# Patient Record
Sex: FEMALE | Race: WHITE | NOT HISPANIC OR LATINO | Employment: UNEMPLOYED | URBAN - METROPOLITAN AREA
[De-identification: names, ages, dates, MRNs, and addresses within clinical notes are randomized per-mention and may not be internally consistent; named-entity substitution may affect disease eponyms.]

---

## 2017-12-26 ENCOUNTER — HOSPITAL ENCOUNTER (EMERGENCY)
Facility: HOSPITAL | Age: 32
Discharge: HOME | End: 2017-12-26
Attending: EMERGENCY MEDICINE | Admitting: EMERGENCY MEDICINE
Payer: COMMERCIAL

## 2017-12-26 ENCOUNTER — APPOINTMENT (EMERGENCY)
Dept: RADIOLOGY | Facility: HOSPITAL | Age: 32
End: 2017-12-26
Payer: COMMERCIAL

## 2017-12-26 VITALS
HEART RATE: 101 BPM | WEIGHT: 202 LBS | BODY MASS INDEX: 38.14 KG/M2 | TEMPERATURE: 98.7 F | SYSTOLIC BLOOD PRESSURE: 130 MMHG | HEIGHT: 61 IN | RESPIRATION RATE: 18 BRPM | OXYGEN SATURATION: 98 % | DIASTOLIC BLOOD PRESSURE: 64 MMHG

## 2017-12-26 DIAGNOSIS — J40 BRONCHITIS: Primary | ICD-10-CM

## 2017-12-26 PROCEDURE — 71020 HB CHEST X-RAY 2VW FRONTAL&LATL: CPT

## 2017-12-26 PROCEDURE — 99283 EMERGENCY DEPT VISIT LOW MDM: CPT

## 2017-12-26 RX ORDER — CETIRIZINE HYDROCHLORIDE 10 MG/1
10 TABLET ORAL AS NEEDED
COMMUNITY

## 2017-12-26 RX ORDER — BENZONATATE 100 MG/1
100 CAPSULE ORAL 3 TIMES DAILY PRN
Qty: 20 CAPSULE | Refills: 0 | Status: SHIPPED | OUTPATIENT
Start: 2017-12-26 | End: 2019-02-13

## 2017-12-26 RX ORDER — GUAIFENESIN/DEXTROMETHORPHAN 100-10MG/5
5 SYRUP ORAL 3 TIMES DAILY PRN
Qty: 118 ML | Refills: 0 | Status: SHIPPED | OUTPATIENT
Start: 2017-12-26 | End: 2019-02-13

## 2017-12-26 RX ORDER — ROSUVASTATIN CALCIUM 10 MG/1
10 TABLET, COATED ORAL DAILY
COMMUNITY
End: 2019-02-13

## 2017-12-26 NOTE — ED PROVIDER NOTES
History  Chief Complaint   Patient presents with    URI     Pt having productive cough, congestion, and painful glands  Pt finish levaquin 3 weeks ago but cough getting worse  Suni Richards is a 33 y o  female w PMH HLD, asthma, seasonal allergies who presents for evaluation of cough  Pt w productive cough, rhinorrhea, headache, general myalgias for past few days  Ha located in frontal region, mild in nature  No fever / chills  No n/v/d  Was on levaquin, prednisone in past few weeks  has never been admitted for asthma  Prior to Admission Medications   Prescriptions Last Dose Informant Patient Reported? Taking? NON FORMULARY   Yes Yes   beclomethasone (QVAR) 80 MCG/ACT inhaler   Yes Yes   Sig: Inhale 1 puff 2 (two) times a day   cetirizine (ZyrTEC) 10 mg tablet   Yes Yes   Sig: Take 10 mg by mouth daily   rosuvastatin (CRESTOR) 10 MG tablet   Yes Yes   Sig: Take 10 mg by mouth daily      Facility-Administered Medications: None       No past medical history on file  No past surgical history on file  No family history on file  I have reviewed and agree with the history as documented  Social History   Substance Use Topics    Smoking status: Not on file    Smokeless tobacco: Not on file    Alcohol use Not on file        Review of Systems   Constitutional: Positive for fatigue  Negative for chills, diaphoresis and fever  HENT: Positive for congestion  Negative for sore throat  Eyes: Negative for visual disturbance  Respiratory: Positive for cough  Negative for chest tightness, shortness of breath and wheezing  Cardiovascular: Negative for chest pain and leg swelling  Gastrointestinal: Negative for abdominal pain, constipation, diarrhea, nausea and vomiting  Genitourinary: Negative for difficulty urinating, dysuria, frequency, hematuria, urgency, vaginal bleeding, vaginal discharge and vaginal pain  Musculoskeletal: Negative for arthralgias and myalgias     Neurological: Positive for headaches  Negative for dizziness, weakness, light-headedness and numbness  Psychiatric/Behavioral: The patient is not nervous/anxious  Physical Exam  ED Triage Vitals [12/26/17 0940]   Temperature Pulse Respirations Blood Pressure SpO2   98 7 °F (37 1 °C) 101 18 130/64 98 %      Temp Source Heart Rate Source Patient Position - Orthostatic VS BP Location FiO2 (%)   Oral Monitor -- -- --      Pain Score       Worst Possible Pain           Orthostatic Vital Signs  Vitals:    12/26/17 0940   BP: 130/64   Pulse: 101       Physical Exam   Constitutional: She is oriented to person, place, and time  She appears well-developed and well-nourished  No distress  HENT:   Head: Normocephalic and atraumatic  Eyes: Pupils are equal, round, and reactive to light  Neck: Neck supple  No tracheal deviation present  Cardiovascular: Normal rate, regular rhythm and intact distal pulses  Exam reveals no gallop and no friction rub  No murmur heard  Pulmonary/Chest: Effort normal and breath sounds normal  No respiratory distress  She has no wheezes  She has no rales  Abdominal: Soft  Bowel sounds are normal  She exhibits no distension and no mass  There is no tenderness  There is no guarding  Musculoskeletal: She exhibits no edema or deformity  Neurological: She is alert and oriented to person, place, and time  Skin: Skin is warm and dry  She is not diaphoretic  Psychiatric: She has a normal mood and affect  Her behavior is normal    Nursing note and vitals reviewed  ED Medications  Medications - No data to display    Diagnostic Studies  Results Reviewed     None                 XR chest 2 views   Final Result by Ney Fleming MD (12/26 1114)      No active pulmonary disease           Workstation performed: [id number]                    Procedures  Procedures       Phone Contacts  ED Phone Contact    ED Course  ED Course                                MDM  Number of Diagnoses or Management Options  Diagnosis management comments: DDX includes but not ltd to:   Asthma worsened by underlying bronchitis vs pna   No acute exacerbation of asthma here, no requirement for neb tx here, no resp distress     Plan is to obtain:  CXR to check for congestive changes, active pulmonary disease     Based on results:  tx for bronchitis, will have her continue take cough syrup, PCP follow up, continue PRN nebs      Return parameters discussed  Pt requires f/u as an outpt  Pt expresses understanding w above treatment plan  All questions answered prior to d/c  Portions of the record may have been created with voice recognition software   Occasional wrong word or "sound a like" substitutions may have occurred due to the inherent limitations of voice recognition software   Read the chart carefully and recognize, using context, where substitutions have occurred  CritCare Time    Disposition  Final diagnoses:   Bronchitis     Time reflects when diagnosis was documented in both MDM as applicable and the Disposition within this note     Time User Action Codes Description Comment    12/26/2017 11:24 AM Jil Portal Add [J40] Bronchitis       ED Disposition     ED Disposition Condition Comment    Discharge  Rodney Ramirez discharge to home/self care      Condition at discharge: Good        Follow-up Information     Follow up With Specialties Details Why Contact Info Additional Information    [facility]  If symptoms worsen [address] [facility] [address]  [phone] [place] [facility], [address] [place], [place], [address] [facility] , [address] [facility] In 2 days  [address]  [facility] [address]  [phone]           Discharge Medication List as of 12/26/2017 11:26 AM      START taking these medications    Details   benzonatate (TESSALON PERLES) 100 mg capsule Take 1 capsule by mouth 3 (three) times a day as needed for cough, Starting Tue 12/26/2017, Print      dextromethorphan-guaiFENesin (ROBITUSSIN DM)  mg/5 mL syrup Take 5 mL by mouth 3 (three) times a day as needed for cough, Starting Tue 12/26/2017, Print         CONTINUE these medications which have NOT CHANGED    Details   beclomethasone (QVAR) 80 MCG/ACT inhaler Inhale 1 puff 2 (two) times a day, Historical Med      cetirizine (ZyrTEC) 10 mg tablet Take 10 mg by mouth daily, Historical Med      NON FORMULARY Historical Med      rosuvastatin (CRESTOR) 10 MG tablet Take 10 mg by mouth daily, Historical Med           No discharge procedures on file      ED Provider  Electronically Signed by           Vinay Gant PA-C  12/29/17 1973

## 2017-12-26 NOTE — DISCHARGE INSTRUCTIONS
Acute Bronchitis   WHAT YOU NEED TO KNOW:   Acute bronchitis is swelling and irritation in the air passages of your lungs  This irritation may cause you to cough or have other breathing problems  Acute bronchitis often starts because of another illness, such as a cold or the flu  The illness spreads from your nose and throat to your windpipe and airways  Bronchitis is often called a chest cold  Acute bronchitis lasts about 3 to 6 weeks and is usually not a serious illness  Your cough can last for several weeks  DISCHARGE INSTRUCTIONS:   Return to the emergency department if:   · You cough up blood  · Your lips or fingernails turn blue  · You feel like you are not getting enough air when you breathe  Contact your healthcare provider if:   · You have a fever  · Your breathing problems do not go away or get worse  · Your cough does not get better within 4 weeks  · You have questions or concerns about your condition or care  Self-care:   · Get more rest   Rest helps your body to heal  Slowly start to do more each day  Rest when you feel it is needed  · Avoid irritants in the air  Avoid chemicals, fumes, and dust  Wear a face mask if you must work around dust or fumes  Stay inside on days when air pollution levels are high  If you have allergies, stay inside when pollen counts are high  Do not use aerosol products, such as spray-on deodorant, bug spray, and hair spray  · Do not smoke or be around others who smoke  Nicotine and other chemicals in cigarettes and cigars damages the cilia that move mucus out of your lungs  Ask your healthcare provider for information if you currently smoke and need help to quit  E-cigarettes or smokeless tobacco still contain nicotine  Talk to your healthcare provider before you use these products  · Drink liquids as directed  Liquids help keep your air passages moist and help you cough up mucus   You may need to drink more liquids when you have acute bronchitis  Ask how much liquid to drink each day and which liquids are best for you  · Use a humidifier or vaporizer  Use a cool mist humidifier or a vaporizer to increase air moisture in your home  This may make it easier for you to breathe and help decrease your cough  Decrease risk for acute bronchitis:   · Get the vaccinations you need  Ask your healthcare provider if you should get vaccinated against the flu or pneumonia  · Prevent the spread of germs  You can decrease your risk of acute bronchitis and other illnesses by doing the following:     [facility] your hands often with soap and water  Carry germ-killing hand lotion or gel with you  You can use the lotion or gel to clean your hands when soap and water are not available  ¨ Do not touch your eyes, nose, or mouth unless you have washed your hands first     ¨ Always cover your mouth when you cough to prevent the spread of germs  It is best to cough into a tissue or your shirt sleeve instead of into your hand  Ask those around you cover their mouths when they cough  ¨ Try to avoid people who have a cold or the flu  If you are sick, stay away from others as much as possible  Medicines: Your healthcare provider may  give you any of the following:  · Ibuprofen or acetaminophen  are medicines that help lower your fever  They are available without a doctor's order  Ask your healthcare provider which medicine is right for you  Ask how much to take and how often to take it  Follow directions  These medicines can cause stomach bleeding if not taken correctly  Ibuprofen can cause kidney damage  Do not take ibuprofen if you have kidney disease, an ulcer, or allergies to aspirin  Acetaminophen can cause liver damage  Do not take more than 4,000 milligrams in 24 hours  · Decongestants  help loosen mucus in your lungs and make it easier to cough up  This can help you breathe easier  · Cough suppressants  decrease your urge to cough   If your cough produces mucus, do not take a cough suppressant unless your healthcare provider tells you to  Your healthcare provider may suggest that you take a cough suppressant at night so you can rest     · Inhalers  may be given  Your healthcare provider may give you one or more inhalers to help you breathe easier and cough less  An inhaler gives your medicine to open your airways  Ask your healthcare provider to show you how to use your inhaler correctly  · Take your medicine as directed  Contact your healthcare provider if you think your medicine is not helping or if you have side effects  Tell him of her if you are allergic to any medicine  Keep a list of the medicines, vitamins, and herbs you take  Include the amounts, and when and why you take them  Bring the list or the pill bottles to follow-up visits  Carry your medicine list with you in case of an emergency  Follow up with your healthcare provider as directed:  Write down questions you have so you will remember to ask them during your follow-up visits  © 2017 [employer] Information is for End User's use only and may not be sold, redistributed or otherwise used for commercial purposes  All illustrations and images included in CareNotes® are the copyrighted property of [employer]  or Marciano Myers  The above information is an  only  It is not intended as medical advice for individual conditions or treatments  Talk to your doctor, nurse or pharmacist before following any medical regimen to see if it is safe and effective for you

## 2019-02-13 ENCOUNTER — OFFICE VISIT (OUTPATIENT)
Dept: URGENT CARE | Facility: CLINIC | Age: 34
End: 2019-02-13
Payer: COMMERCIAL

## 2019-02-13 ENCOUNTER — TELEPHONE (OUTPATIENT)
Dept: URGENT CARE | Facility: CLINIC | Age: 34
End: 2019-02-13

## 2019-02-13 ENCOUNTER — APPOINTMENT (OUTPATIENT)
Dept: RADIOLOGY | Facility: CLINIC | Age: 34
End: 2019-02-13
Payer: COMMERCIAL

## 2019-02-13 VITALS
HEART RATE: 133 BPM | SYSTOLIC BLOOD PRESSURE: 116 MMHG | BODY MASS INDEX: 41.66 KG/M2 | RESPIRATION RATE: 16 BRPM | OXYGEN SATURATION: 97 % | DIASTOLIC BLOOD PRESSURE: 82 MMHG | WEIGHT: 226.4 LBS | TEMPERATURE: 97.3 F | HEIGHT: 62 IN

## 2019-02-13 DIAGNOSIS — B96.89 ACUTE BACTERIAL BRONCHITIS: Primary | ICD-10-CM

## 2019-02-13 DIAGNOSIS — R05.9 COUGH: ICD-10-CM

## 2019-02-13 DIAGNOSIS — J20.8 ACUTE BACTERIAL BRONCHITIS: Primary | ICD-10-CM

## 2019-02-13 PROCEDURE — 71046 X-RAY EXAM CHEST 2 VIEWS: CPT

## 2019-02-13 PROCEDURE — 99213 OFFICE O/P EST LOW 20 MIN: CPT | Performed by: PHYSICIAN ASSISTANT

## 2019-02-13 RX ORDER — METHYLPREDNISOLONE 4 MG/1
TABLET ORAL
Qty: 1 EACH | Refills: 0 | Status: SHIPPED | OUTPATIENT
Start: 2019-02-13 | End: 2019-02-22 | Stop reason: ALTCHOICE

## 2019-02-13 RX ORDER — DEXTROMETHORPHAN HYDROBROMIDE AND PROMETHAZINE HYDROCHLORIDE 15; 6.25 MG/5ML; MG/5ML
5 SYRUP ORAL 4 TIMES DAILY PRN
Qty: 118 ML | Refills: 0 | Status: SHIPPED | OUTPATIENT
Start: 2019-02-13 | End: 2019-02-22 | Stop reason: ALTCHOICE

## 2019-02-13 RX ORDER — LEVOFLOXACIN 750 MG/1
750 TABLET ORAL EVERY 24 HOURS
Qty: 5 TABLET | Refills: 0 | OUTPATIENT
Start: 2019-02-13 | End: 2019-02-14

## 2019-02-13 RX ORDER — ALBUTEROL SULFATE 90 UG/1
2 AEROSOL, METERED RESPIRATORY (INHALATION) EVERY 6 HOURS PRN
Qty: 18 G | Refills: 0 | Status: SHIPPED | OUTPATIENT
Start: 2019-02-13 | End: 2019-06-25

## 2019-02-13 NOTE — TELEPHONE ENCOUNTER
Reviewed final radiology results with patient  Continue antibiotics and steroids as planned  Follow up with PCP in 4-6 weeks for repeat chest x-ray  May need a CT scan if infiltrate is still present  Patient understands instructions  Gave information for primary care at [facility]  Patient states she does not see PCP and a regular basis

## 2019-02-13 NOTE — PROGRESS NOTES
[facility]        NAME: Nilsa Cancino is a 29 y o  female  : 1985    MRN: [id number]  DATE: 2019  TIME: 10:53 AM    Assessment and Plan   Acute bacterial bronchitis [J20 8, B96 89]  1  Acute bacterial bronchitis  XR chest pa & lateral    methylPREDNISolone 4 MG tablet therapy pack    promethazine-dextromethorphan (PHENERGAN-DM) 6 25-15 mg/5 mL oral syrup    levofloxacin (LEVAQUIN) 750 mg tablet    albuterol (VENTOLIN HFA) 90 mcg/act inhaler     Chest x-ray reviewed by myself; Left lower lung consolidation suspicious for pna    Patient Instructions       Follow up with PCP in 3-5 days  Proceed to  ER if symptoms worsen  Chief Complaint     Chief Complaint   Patient presents with    Cold Like Symptoms     started Monday  complains of cough and congestion, wheezing especially at night, SOB  using sons albuterol neb at home with not much relief   Cough         History of Present Illness       62-year-old female with past medical history of asthmatic bronchitis presents with a cough and congestion for 4 days  Patient states her son is sick at with similar symptoms  Patient states her son was tested for the flu was negative  She complains of chills, wheezing and shortness of breath  Denies fever at home  Review of Systems   Review of Systems   Constitutional: Negative for chills, fatigue and fever  HENT: Positive for congestion  Negative for ear pain, sinus pain, sore throat and trouble swallowing  Eyes: Negative for pain, discharge and redness  Respiratory: Positive for cough, shortness of breath and wheezing  Negative for chest tightness  Cardiovascular: Negative for chest pain, palpitations and leg swelling  Gastrointestinal: Negative for abdominal pain, diarrhea, nausea and vomiting  Musculoskeletal: Negative for arthralgias, joint swelling and myalgias  Skin: Negative for rash  Neurological: Negative for dizziness, weakness, numbness and headaches  Current Medications       Current Outpatient Medications:     cetirizine (ZyrTEC) 10 mg tablet, Take 10 mg by mouth as needed , Disp: , Rfl:     albuterol (VENTOLIN HFA) 90 mcg/act inhaler, Inhale 2 puffs every 6 (six) hours as needed for wheezing, Disp: 18 g, Rfl: 0    levofloxacin (LEVAQUIN) 750 mg tablet, Take 1 tablet (750 mg total) by mouth every 24 hours for 5 days, Disp: 5 tablet, Rfl: 0    methylPREDNISolone 4 MG tablet therapy pack, Use as directed on package, Disp: 1 each, Rfl: 0    promethazine-dextromethorphan (PHENERGAN-DM) 6 25-15 mg/5 mL oral syrup, Take 5 mL by mouth 4 (four) times a day as needed for cough, Disp: 118 mL, Rfl: 0    Current Allergies     Allergies as of 2019 - Reviewed 2019   Allergen Reaction Noted    Penicillins  2017    Zithromax [azithromycin] GI Intolerance 2017    Clindamycin Rash 2017            The following portions of the patient's history were reviewed and updated as appropriate: allergies, current medications, past family history, past medical history, past social history, past surgical history and problem list      Past Medical History:   Diagnosis Date    Elevated cholesterol        Past Surgical History:   Procedure Laterality Date     SECTION      DILATION AND CURETTAGE OF UTERUS         No family history on file  Medications have been verified  Objective   /82 (BP Location: Left arm, Patient Position: Sitting)   Pulse (!) 133   Temp (!) 97 3 °F (36 3 °C) (Tympanic)   Resp 16   Ht 5' 2" (1 575 m)   Wt 103 kg (226 lb 6 4 oz)   LMP 2019   SpO2 97%   BMI 41 41 kg/m²        Physical Exam     Physical Exam   Constitutional: She is oriented to person, place, and time  She appears well-developed and well-nourished  No distress  HENT:   Head: Normocephalic     Right Ear: External ear normal    Left Ear: External ear normal    Mouth/Throat: Oropharynx is clear and moist    Eyes: Pupils are equal, round, and reactive to light  Conjunctivae and EOM are normal    Neck: Normal range of motion  Neck supple  Cardiovascular: Normal rate, regular rhythm and normal heart sounds  No murmur heard  Pulmonary/Chest: Effort normal  No respiratory distress  She has wheezes (Diffuse expiratory wheezes throughout)  Abdominal: Soft  Bowel sounds are normal  There is no tenderness  Musculoskeletal: Normal range of motion  Lymphadenopathy:     She has no cervical adenopathy  Neurological: She is alert and oriented to person, place, and time  She has normal reflexes  Skin: Skin is warm and dry  Psychiatric: She has a normal mood and affect  Nursing note and vitals reviewed

## 2019-02-14 ENCOUNTER — APPOINTMENT (EMERGENCY)
Dept: CT IMAGING | Facility: HOSPITAL | Age: 34
End: 2019-02-14
Payer: COMMERCIAL

## 2019-02-14 ENCOUNTER — HOSPITAL ENCOUNTER (EMERGENCY)
Facility: HOSPITAL | Age: 34
Discharge: HOME | End: 2019-02-14
Attending: EMERGENCY MEDICINE | Admitting: EMERGENCY MEDICINE
Payer: COMMERCIAL

## 2019-02-14 VITALS
TEMPERATURE: 97.8 F | SYSTOLIC BLOOD PRESSURE: 126 MMHG | DIASTOLIC BLOOD PRESSURE: 60 MMHG | RESPIRATION RATE: 24 BRPM | WEIGHT: 227.07 LBS | HEART RATE: 103 BPM | HEIGHT: 62 IN | BODY MASS INDEX: 41.79 KG/M2 | OXYGEN SATURATION: 99 %

## 2019-02-14 DIAGNOSIS — J18.9 PNEUMONIA: ICD-10-CM

## 2019-02-14 DIAGNOSIS — R05.9 COUGH: ICD-10-CM

## 2019-02-14 DIAGNOSIS — J06.9 UPPER RESPIRATORY TRACT INFECTION, UNSPECIFIED TYPE: Primary | ICD-10-CM

## 2019-02-14 DIAGNOSIS — R09.81 NASAL CONGESTION: ICD-10-CM

## 2019-02-14 LAB
ANION GAP SERPL CALCULATED.3IONS-SCNC: 13 MMOL/L (ref 4–13)
BASOPHILS # BLD AUTO: 0.03 THOUSANDS/ΜL (ref 0–0.1)
BASOPHILS NFR BLD AUTO: 1 % (ref 0–1)
BUN SERPL-MCNC: 12 MG/DL (ref 5–25)
CALCIUM SERPL-MCNC: 8.9 MG/DL (ref 8.3–10.1)
CHLORIDE SERPL-SCNC: 102 MMOL/L (ref 100–108)
CO2 SERPL-SCNC: 24 MMOL/L (ref 21–32)
CREAT SERPL-MCNC: 0.93 MG/DL (ref 0.6–1.3)
EOSINOPHIL # BLD AUTO: 0 THOUSAND/ΜL (ref 0–0.61)
EOSINOPHIL NFR BLD AUTO: 0 % (ref 0–6)
ERYTHROCYTE [DISTWIDTH] IN BLOOD BY AUTOMATED COUNT: 13.4 % (ref 11.6–15.1)
GFR SERPL CREATININE-BSD FRML MDRD: 82 ML/MIN/1.73SQ M
GLUCOSE SERPL-MCNC: 102 MG/DL (ref 65–140)
HCG SERPL QL: NEGATIVE
HCT VFR BLD AUTO: 42.6 % (ref 34.8–46.1)
HGB BLD-MCNC: 14 G/DL (ref 11.5–15.4)
IMM GRANULOCYTES # BLD AUTO: 0.02 THOUSAND/UL (ref 0–0.2)
IMM GRANULOCYTES NFR BLD AUTO: 0 % (ref 0–2)
LYMPHOCYTES # BLD AUTO: 1.63 THOUSANDS/ΜL (ref 0.6–4.47)
LYMPHOCYTES NFR BLD AUTO: 28 % (ref 14–44)
MCH RBC QN AUTO: 29.9 PG (ref 26.8–34.3)
MCHC RBC AUTO-ENTMCNC: 32.9 G/DL (ref 31.4–37.4)
MCV RBC AUTO: 91 FL (ref 82–98)
MONOCYTES # BLD AUTO: 0.9 THOUSAND/ΜL (ref 0.17–1.22)
MONOCYTES NFR BLD AUTO: 15 % (ref 4–12)
NEUTROPHILS # BLD AUTO: 3.34 THOUSANDS/ΜL (ref 1.85–7.62)
NEUTS SEG NFR BLD AUTO: 56 % (ref 43–75)
NRBC BLD AUTO-RTO: 0 /100 WBCS
PLATELET # BLD AUTO: 272 THOUSANDS/UL (ref 149–390)
PMV BLD AUTO: 10.5 FL (ref 8.9–12.7)
POTASSIUM SERPL-SCNC: 3.5 MMOL/L (ref 3.5–5.3)
RBC # BLD AUTO: 4.69 MILLION/UL (ref 3.81–5.12)
SODIUM SERPL-SCNC: 139 MMOL/L (ref 136–145)
TROPONIN I SERPL-MCNC: <0.02 NG/ML
WBC # BLD AUTO: 5.92 THOUSAND/UL (ref 4.31–10.16)

## 2019-02-14 PROCEDURE — 93005 ELECTROCARDIOGRAM TRACING: CPT

## 2019-02-14 PROCEDURE — 84484 ASSAY OF TROPONIN QUANT: CPT | Performed by: PHYSICIAN ASSISTANT

## 2019-02-14 PROCEDURE — 85025 COMPLETE CBC W/AUTO DIFF WBC: CPT | Performed by: PHYSICIAN ASSISTANT

## 2019-02-14 PROCEDURE — 36415 COLL VENOUS BLD VENIPUNCTURE: CPT | Performed by: PHYSICIAN ASSISTANT

## 2019-02-14 PROCEDURE — 94640 AIRWAY INHALATION TREATMENT: CPT

## 2019-02-14 PROCEDURE — 80048 BASIC METABOLIC PNL TOTAL CA: CPT | Performed by: PHYSICIAN ASSISTANT

## 2019-02-14 PROCEDURE — 99285 EMERGENCY DEPT VISIT HI MDM: CPT

## 2019-02-14 PROCEDURE — 84703 CHORIONIC GONADOTROPIN ASSAY: CPT | Performed by: PHYSICIAN ASSISTANT

## 2019-02-14 PROCEDURE — 71260 CT THORAX DX C+: CPT

## 2019-02-14 RX ORDER — IPRATROPIUM BROMIDE AND ALBUTEROL SULFATE 2.5; .5 MG/3ML; MG/3ML
3 SOLUTION RESPIRATORY (INHALATION)
Status: DISCONTINUED | OUTPATIENT
Start: 2019-02-14 | End: 2019-02-15 | Stop reason: HOSPADM

## 2019-02-14 RX ORDER — IBUPROFEN 400 MG/1
400 TABLET ORAL EVERY 6 HOURS PRN
Qty: 20 TABLET | Refills: 0 | Status: SHIPPED | OUTPATIENT
Start: 2019-02-14 | End: 2020-03-09

## 2019-02-14 RX ORDER — IPRATROPIUM BROMIDE AND ALBUTEROL SULFATE 2.5; .5 MG/3ML; MG/3ML
3 SOLUTION RESPIRATORY (INHALATION)
Status: DISCONTINUED | OUTPATIENT
Start: 2019-02-14 | End: 2019-02-14

## 2019-02-14 RX ORDER — OXYMETAZOLINE HYDROCHLORIDE 0.05 G/100ML
2 SPRAY NASAL ONCE
Status: COMPLETED | OUTPATIENT
Start: 2019-02-14 | End: 2019-02-14

## 2019-02-14 RX ORDER — ALBUTEROL SULFATE 90 UG/1
2 AEROSOL, METERED RESPIRATORY (INHALATION) EVERY 4 HOURS PRN
Qty: 1 INHALER | Refills: 0 | Status: SHIPPED | OUTPATIENT
Start: 2019-02-14 | End: 2021-02-11 | Stop reason: ALTCHOICE

## 2019-02-14 RX ORDER — DOXYCYCLINE HYCLATE 100 MG/1
100 CAPSULE ORAL 2 TIMES DAILY
Qty: 14 CAPSULE | Refills: 0 | Status: SHIPPED | OUTPATIENT
Start: 2019-02-14 | End: 2019-02-21

## 2019-02-14 RX ORDER — ALBUTEROL SULFATE 90 UG/1
2 AEROSOL, METERED RESPIRATORY (INHALATION) ONCE
Status: COMPLETED | OUTPATIENT
Start: 2019-02-14 | End: 2019-02-14

## 2019-02-14 RX ORDER — GUAIFENESIN 100 MG/5ML
200 SYRUP ORAL 3 TIMES DAILY PRN
Qty: 120 ML | Refills: 0 | Status: SHIPPED | OUTPATIENT
Start: 2019-02-14 | End: 2019-02-24

## 2019-02-14 RX ADMIN — IPRATROPIUM BROMIDE AND ALBUTEROL SULFATE 3 ML: 2.5; .5 SOLUTION RESPIRATORY (INHALATION) at 20:37

## 2019-02-14 RX ADMIN — OXYMETAZOLINE HCL 2 SPRAY: 0.05 SPRAY NASAL at 21:29

## 2019-02-14 RX ADMIN — IPRATROPIUM BROMIDE AND ALBUTEROL SULFATE 3 ML: 2.5; .5 SOLUTION RESPIRATORY (INHALATION) at 21:31

## 2019-02-14 RX ADMIN — ALBUTEROL SULFATE 2 PUFF: 90 AEROSOL, METERED RESPIRATORY (INHALATION) at 22:56

## 2019-02-14 RX ADMIN — IOHEXOL 100 ML: 350 INJECTION, SOLUTION INTRAVENOUS at 22:04

## 2019-02-15 NOTE — ED NOTES
Ambulatory pulse ox: >95% for entire duration with top HR of 130        Doc Johnson RN  02/14/19 0438

## 2019-02-15 NOTE — ED PROVIDER NOTES
History  Chief Complaint   Patient presents with    Shortness of Breath     Pt states that yesterday she was dx with pneumonia, and states that today she feels like "i am breathing through a straw" c/o "heart racing"     Palpitations     Patient is a 28-year-old immunized female who presents emergency department with shortness of breath for 1 day  Patient states that associated symptoms are intermittent hacking productive cough with yellow sputum, and bilateral nasal congestion for 4 days  Patient stated that she had intermittent palpitations for 1 day for approximately 10 minute bouts  Patient was recently seen at urgent care 02/13/2019 for cold-like symptoms and cough, with chest x-ray indicating left lower lobe infiltrate suspicious for early pneumonia and vague 7 mm nodular density on left lower lobe, patient was diagnosed with acute bacterial bronchitis, discharged home care with prescriptions of Medrol Dosepak, Phenergan-dm, levofloxacin, and albuterol inhaler  Patient stated that she became short of breath with walking into emergency department whereby she needed to sit in a wheelchair and the wheeled to her room  Patient denies palliative and provocative factors  Patient denies ineffective treatment  Patient denies fevers, chills, nausea, and vomiting  Patient denies diarrhea, constipation, and urinary symptoms  Patient denies recent fall or trauma  Patient denies history of DVT, thrombophlebitis, and PE  Patient denies headaches, tinnitus, vertiginous, and meningeal symptoms  Patient recent travel  Patient confirms sick contact of 1year-old son at home with similar symptoms, with son recently testing negative for flu  Patient recently taking Medrol Dosepak scheduled indicated doses  Patient denies chest pain, shortness of breath, abdominal pain  Patient is not in acute distress        History provided by:  Patient   used: No    Shortness of Breath   Severity: MA attempted to call patient to schedule her follow up visit she is unable to reached left her VM to please give us a callback.    Mild  Onset quality:  Gradual  Duration:  1 day  Timing:  Constant  Progression:  Worsening  Context: activity    Relieved by:  Lying down and rest  Worsened by:  Exertion  Ineffective treatments:  None tried  Associated symptoms: wheezing    Associated symptoms: no abdominal pain, no chest pain, no cough, no ear pain, no fever, no headaches, no rash, no sore throat and no vomiting    Wheezing:     Severity:  Moderate    Onset quality:  Gradual    Duration:  3 days    Timing:  Intermittent    Progression:  Worsening    Chronicity:  New  Risk factors: obesity    Risk factors: no hx of PE/DVT, no oral contraceptive use, no prolonged immobilization, no recent surgery and no tobacco use        Prior to Admission Medications   Prescriptions Last Dose Informant Patient Reported? Taking? albuterol (VENTOLIN HFA) 90 mcg/act inhaler   No No   Sig: Inhale 2 puffs every 6 (six) hours as needed for wheezing   cetirizine (ZyrTEC) 10 mg tablet  Self Yes No   Sig: Take 10 mg by mouth as needed    levofloxacin (LEVAQUIN) 750 mg tablet   No No   Sig: Take 1 tablet (750 mg total) by mouth every 24 hours for 5 days   methylPREDNISolone 4 MG tablet therapy pack   No No   Sig: Use as directed on package   promethazine-dextromethorphan (PHENERGAN-DM) 6 25-15 mg/5 mL oral syrup   No No   Sig: Take 5 mL by mouth 4 (four) times a day as needed for cough      Facility-Administered Medications: None       Past Medical History:   Diagnosis Date    Elevated cholesterol        Past Surgical History:   Procedure Laterality Date     SECTION      DILATION AND CURETTAGE OF UTERUS         Family History   Problem Relation Age of Onset    Diabetes Mother      I have reviewed and agree with the history as documented      Social History     Tobacco Use    Smoking status: Never Smoker    Smokeless tobacco: Never Used   Substance Use Topics    Alcohol use: Never     Frequency: Never    Drug use: Never        Review of Systems Constitutional: Negative for chills and fever  HENT: Positive for congestion  Negative for ear pain, mouth sores, postnasal drip, rhinorrhea, sinus pressure, sneezing, sore throat, tinnitus and trouble swallowing  Eyes: Negative for photophobia and visual disturbance  Respiratory: Positive for shortness of breath and wheezing  Negative for cough and chest tightness  Cardiovascular: Negative for chest pain and palpitations  Gastrointestinal: Negative for abdominal pain, constipation, diarrhea, nausea and vomiting  Genitourinary: Negative for difficulty urinating, dysuria and frequency  Musculoskeletal: Negative for back pain and gait problem  Skin: Negative for pallor and rash  Allergic/Immunologic: Negative for environmental allergies and food allergies  Neurological: Negative for dizziness and headaches  Psychiatric/Behavioral: Negative for confusion  All other systems reviewed and are negative  Physical Exam  Physical Exam   Constitutional: She is oriented to person, place, and time  She appears well-developed and well-nourished  She is active and cooperative  Non-toxic appearance  She does not have a sickly appearance  She does not appear ill  No distress  HENT:   Head: Normocephalic and atraumatic  Right Ear: Hearing, tympanic membrane, external ear and ear canal normal  No drainage, swelling or tenderness  No mastoid tenderness  No decreased hearing is noted  Left Ear: Hearing, tympanic membrane, external ear and ear canal normal  No drainage, swelling or tenderness  No mastoid tenderness  No decreased hearing is noted  Nose: Nose normal  Right sinus exhibits no maxillary sinus tenderness and no frontal sinus tenderness  Left sinus exhibits no maxillary sinus tenderness and no frontal sinus tenderness  Mouth/Throat: Uvula is midline, oropharynx is clear and moist and mucous membranes are normal    Eyes: Pupils are equal, round, and reactive to light   Conjunctivae, EOM and lids are normal  Right eye exhibits no discharge  Left eye exhibits no discharge  Neck: Trachea normal, normal range of motion, full passive range of motion without pain and phonation normal  Neck supple  No JVD present  No tracheal tenderness, no spinous process tenderness and no muscular tenderness present  No neck rigidity  No tracheal deviation and normal range of motion present  Cardiovascular: Normal rate, regular rhythm, normal heart sounds, intact distal pulses and normal pulses  Pulses:       Radial pulses are 2+ on the right side, and 2+ on the left side  Posterior tibial pulses are 2+ on the right side, and 2+ on the left side  Pulmonary/Chest: Effort normal  No stridor  She has no decreased breath sounds  She has wheezes in the right upper field, the right middle field, the right lower field, the left upper field, the left middle field and the left lower field  She has rhonchi in the right lower field and the left lower field  She has no rales  She exhibits no tenderness, no bony tenderness and no crepitus  Patient has expiratory wheezing all lung fields bilaterally  Patient with ability to speak with 5-8 word sentences with no difficulty or rest pause      Patient heart rate on assessment - 90  Respirations of 18 with SP O2 sat 100% with self  verbalization of past medical history during physical exam   Abdominal: Soft  Bowel sounds are normal  She exhibits no distension  There is no tenderness  There is no rigidity, no rebound, no guarding and no CVA tenderness  Musculoskeletal: Normal range of motion  Passive ROM intact  Upper and lower extremity 5/5 bilaterally  Neurovascularly intact  No grinding or clicking of joints     Lymphadenopathy:        Head (right side): No submental, no submandibular, no tonsillar, no preauricular, no posterior auricular and no occipital adenopathy present          Head (left side): No submental, no submandibular, no tonsillar, no preauricular, no posterior auricular and no occipital adenopathy present  She has no cervical adenopathy  Right cervical: No superficial cervical, no deep cervical and no posterior cervical adenopathy present  Left cervical: No superficial cervical, no deep cervical and no posterior cervical adenopathy present  Neurological: She is alert and oriented to person, place, and time  She has normal strength and normal reflexes  No sensory deficit  GCS eye subscore is 4  GCS verbal subscore is 5  GCS motor subscore is 6  Reflex Scores:       Patellar reflexes are 2+ on the right side and 2+ on the left side  Skin: Skin is warm and intact  Capillary refill takes less than 2 seconds  She is not diaphoretic  Psychiatric: She has a normal mood and affect  Her speech is normal and behavior is normal  Judgment and thought content normal  Cognition and memory are normal    Nursing note and vitals reviewed        Vital Signs  ED Triage Vitals   Temperature Pulse Respirations Blood Pressure SpO2   02/14/19 1909 02/14/19 1907 02/14/19 1907 02/14/19 1907 02/14/19 1907   97 8 °F (36 6 °C) 103 22 157/88 100 %      Temp Source Heart Rate Source Patient Position - Orthostatic VS BP Location FiO2 (%)   02/14/19 1909 02/14/19 1907 02/14/19 1907 02/14/19 1907 --   Oral Monitor Lying Left arm       Pain Score       02/14/19 1907       No Pain           Vitals:    02/14/19 1907 02/14/19 2110 02/14/19 2115   BP: 157/88 126/60 126/60   Pulse: 103 (!) 108 103   Patient Position - Orthostatic VS: Lying         Visual Acuity      ED Medications  Medications   oxymetazoline (AFRIN) 0 05 % nasal spray 2 spray (2 sprays Each Nare Given 2/14/19 2129)   iohexol (OMNIPAQUE) 350 MG/ML injection (SINGLE-DOSE) 100 mL (100 mL Intravenous Given 2/14/19 2204)   albuterol (PROVENTIL HFA,VENTOLIN HFA) inhaler 2 puff (2 puffs Inhalation Given 2/14/19 2256)       Diagnostic Studies  Results Reviewed     Procedure Component Value Units Date/Time    hCG, qualitative pregnancy [[id number]]  (Normal) Collected:  02/14/19 2043    Lab Status:  Final result Specimen:  Blood from Arm, Left Updated:  02/14/19 2114     Preg, Serum Negative    Basic metabolic panel [[id number]] Collected:  02/14/19 2043    Lab Status:  Final result Specimen:  Blood from Arm, Left Updated:  02/14/19 2114     Sodium 139 mmol/L      Potassium 3 5 mmol/L      Chloride 102 mmol/L      CO2 24 mmol/L      ANION GAP 13 mmol/L      BUN 12 mg/dL      Creatinine 0 93 mg/dL      Glucose 102 mg/dL      Calcium 8 9 mg/dL      eGFR 82 ml/min/1 73sq m     Narrative:       National Kidney Disease Education Program recommendations are as follows:  GFR calculation is accurate only with a steady state creatinine  Chronic Kidney disease less than 60 ml/min/1 73 sq  meters  Kidney failure less than 15 ml/min/1 73 sq  meters      Troponin I [[id number]]  (Normal) Collected:  02/14/19 2043    Lab Status:  Final result Specimen:  Blood from Arm, Left Updated:  02/14/19 2112     Troponin I <0 02 ng/mL     CBC and differential [[id number]]  (Abnormal) Collected:  02/14/19 2043    Lab Status:  Final result Specimen:  Blood from Arm, Left Updated:  02/14/19 2058     WBC 5 92 Thousand/uL      RBC 4 69 Million/uL      Hemoglobin 14 0 g/dL      Hematocrit 42 6 %      MCV 91 fL      MCH 29 9 pg      MCHC 32 9 g/dL      RDW 13 4 %      MPV 10 5 fL      Platelets 400 Thousands/uL      nRBC 0 /100 WBCs      Neutrophils Relative 56 %      Immat GRANS % 0 %      Lymphocytes Relative 28 %      Monocytes Relative 15 %      Eosinophils Relative 0 %      Basophils Relative 1 %      Neutrophils Absolute 3 34 Thousands/µL      Immature Grans Absolute 0 02 Thousand/uL      Lymphocytes Absolute 1 63 Thousands/µL      Monocytes Absolute 0 90 Thousand/µL      Eosinophils Absolute 0 00 Thousand/µL      Basophils Absolute 0 03 Thousands/µL                  CT chest with contrast   Final Result by Suni Ye DO (02/14 2218)      Nodular airspace opacities in the lower lobes (right greater than left) which may represent infectious versus inflammatory causes  Follow-up to resolution is recommended  The study was marked in [place] [facility] for immediate notification                  Workstation performed: [id number]                    Procedures  ECG 12 Lead Documentation  Date/Time: 2/14/2019 8:32 PM  Performed by: Tania Rodriguez PA-C  Authorized by: Tania Rodriguez PA-C     Indications / Diagnosis:  Palpitations and shortness of breath  ECG reviewed by me, the ED Provider: yes    Patient location:  ED  Previous ECG:     Previous ECG:  Unavailable    Comparison to cardiac monitor: Yes    Interpretation:     Interpretation: normal    Rate:     ECG rate:  94    ECG rate assessment: normal    Rhythm:     Rhythm: sinus rhythm    Ectopy:     Ectopy: none    QRS:     QRS axis:  Normal  Conduction:     Conduction: normal    ST segments:     ST segments:  Normal  T waves:     T waves: normal             Phone Contacts  ED Phone Contact    ED Course  ED Course as of Feb 16 0104   Thu Feb 14, 2019   2208 Patient self ambulation with maintenance of SP O2 at 96%                  [facility] Rule for PE      Most Recent Value   PERC Rule for PE   Age >=50  0 Filed at: 02/14/2019 2022   HR >=100  0 Filed at: 02/14/2019 2022   O2 Sat on room air < 95%  0 Filed at: 02/14/2019 2022   History of PE or DVT  0 Filed at: 02/14/2019 2022   Recent trauma or surgery  0 Filed at: 02/14/2019 2022   Hemoptysis  0 Filed at: 02/14/2019 2022   Exogenous estrogen  0 Filed at: 02/14/2019 2022   Unilateral leg swelling  0 Filed at: 02/14/2019 2022   PERC Rule for PE Results  0 Filed at: 02/14/2019 2022                Gabrielle Roberson' Criteria for PE      Most Recent Value   Wells' Criteria for PE   Clinical signs and symptoms of DVT  0 Filed at: 02/14/2019 2021   PE is primary diagnosis or equally likely  0 Filed at: 02/14/2019 2021   HR >100  0 Filed at: 02/14/2019 2021   Immobilization at least 3 days or Surgery in the previous 4 weeks  0 Filed at: 02/14/2019 2021   Previous, objectively diagnosed PE or DVT  0 Filed at: 02/14/2019 2021   Hemoptysis  0 Filed at: 02/14/2019 2021   Malignancy with treatment within 6 months or palliative  0 Filed at: 02/14/2019 2021   Dante Hylton' Criteria Total  0 Filed at: 02/14/2019 2021            MDM  Number of Diagnoses or Management Options  Cough: new and does not require workup  Nasal congestion: new and does not require workup  Pneumonia: new and does not require workup  Upper respiratory tract infection, unspecified type: new and does not require workup  Diagnosis management comments: Patient with recent visit to urgent care; 02/13/2019, current status worsening  Negative wells, negative perc  Delivered to [employer] treatments in the emergency department; patient verbalized decrease in presenting wheezing symptomatology status post medication delivery  On reassessment wheezing has decreased dramatically from the time patient presenting  Patient with P O2 sat with ambulation 96%, patient with verbal conversation with the ED technician during the course of P O2 sat ambulatory exercise  ECG with normal sinus rhythm  Clinical blood labs normal; troponin negative  Qualitative HCG negative  CT chest with contrast indicates bilateral nodular airspace opacities Right > Left, with representation of infectious versus inflammatory causes  Will treat for infectious and start doxycycline in the ED at current patient visit    Patient has medical history of hypercholesteremia  Counseled patient on close follow-up to PCP  Follow up with emergency department if symptoms persist or exacerbate  Counseled patient on resting and obtaining additional assistance in caring for her 1year-old child so that she may rest and recuperate  Continue daily fluids and electrolytes  Patient demonstrates verbal understanding of all clinical and imaging findings, discharge instructions, and follow-up  Will discontinue Levaquin prescribed by urgent care  Prescribed doxycycline, albuterol, guaifenesin,       Amount and/or Complexity of Data Reviewed  Clinical lab tests: ordered and reviewed  Tests in the radiology section of CPT®: ordered and reviewed        Disposition  Final diagnoses:   Upper respiratory tract infection, unspecified type   Pneumonia   Cough   Nasal congestion     Time reflects when diagnosis was documented in both MDM as applicable and the Disposition within this note     Time User Action Codes Description Comment    2/14/2019 10:42 PM Burma Hock Add [J06 9] Upper respiratory tract infection, unspecified type     2/14/2019 10:43 PM Burma Hock Add [J18 9] Pneumonia     2/14/2019 10:43 PM Burma Hock Add [R05] Cough     2/14/2019 10:43 PM Burma Hock Add [R09 81] Nasal congestion       ED Disposition     ED Disposition Condition Date/Time Comment    Discharge Stable u Feb 14, 2019 10:42 PM Suzette Patel discharge to home/self care              Follow-up Information     Follow up With Specialties Details Why Contact Info Additional Information    [address] Call in 3 days for further evaluation of symptoms [address] [facility] [address] [phone] [facility], [address] [place] [address], [address] Emergency Department Emergency Medicine Go to  As needed 34 [facility] ED, [address] [place] [address], [id number]          Discharge Medication List as of 2/14/2019 10:53 PM      START taking these medications    Details   !! albuterol (PROVENTIL HFA,VENTOLIN HFA) 90 mcg/act inhaler Inhale 2 puffs every 4 (four) hours as needed for wheezing, Starting u 2/14/2019, Print      doxycycline hyclate (VIBRAMYCIN) 100 mg capsule Take 1 capsule (100 mg total) by mouth 2 (two) times a day for 7 days, Starting Thu 2/14/2019, Until Thu 2/21/2019, Print      guaiFENesin (ROBITUSSIN) 100 mg/5 mL syrup Take 10 mL (200 mg total) by mouth 3 (three) times a day as needed for cough for up to 10 days, Starting Thu 2/14/2019, Until Sun 2/24/2019, Print      ibuprofen (MOTRIN) 400 mg tablet Take 1 tablet (400 mg total) by mouth every 6 (six) hours as needed for mild pain for up to 5 days, Starting Thu 2/14/2019, Until Tue 2/19/2019, Print       !! - Potential duplicate medications found  Please discuss with provider  CONTINUE these medications which have NOT CHANGED    Details   !! albuterol (VENTOLIN HFA) 90 mcg/act inhaler Inhale 2 puffs every 6 (six) hours as needed for wheezing, Starting Wed 2/13/2019, Normal      cetirizine (ZyrTEC) 10 mg tablet Take 10 mg by mouth as needed , Historical Med      methylPREDNISolone 4 MG tablet therapy pack Use as directed on package, Normal      promethazine-dextromethorphan (PHENERGAN-DM) 6 25-15 mg/5 mL oral syrup Take 5 mL by mouth 4 (four) times a day as needed for cough, Starting Wed 2/13/2019, Normal       !! - Potential duplicate medications found  Please discuss with provider  STOP taking these medications       levofloxacin (LEVAQUIN) 750 mg tablet Comments:   Reason for Stopping:             No discharge procedures on file      ED Provider  Electronically Signed by           Dionte Gonzalez PA-C  02/16/19 0103       Dionte Gonzalez PA-C  02/16/19 0104

## 2019-02-16 LAB
ATRIAL RATE: 94 BPM
P AXIS: 72 DEGREES
PR INTERVAL: 126 MS
QRS AXIS: 57 DEGREES
QRSD INTERVAL: 82 MS
QT INTERVAL: 360 MS
QTC INTERVAL: 450 MS
T WAVE AXIS: 7 DEGREES
VENTRICULAR RATE: 94 BPM

## 2019-02-16 PROCEDURE — 93010 ELECTROCARDIOGRAM REPORT: CPT | Performed by: INTERNAL MEDICINE

## 2019-02-22 ENCOUNTER — OFFICE VISIT (OUTPATIENT)
Dept: FAMILY MEDICINE CLINIC | Facility: CLINIC | Age: 34
End: 2019-02-22
Payer: COMMERCIAL

## 2019-02-22 VITALS
BODY MASS INDEX: 41.81 KG/M2 | HEIGHT: 62 IN | OXYGEN SATURATION: 97 % | WEIGHT: 227.2 LBS | TEMPERATURE: 98.2 F | HEART RATE: 72 BPM | DIASTOLIC BLOOD PRESSURE: 68 MMHG | SYSTOLIC BLOOD PRESSURE: 122 MMHG

## 2019-02-22 DIAGNOSIS — J18.9 PNEUMONIA OF BOTH LUNGS DUE TO INFECTIOUS ORGANISM, UNSPECIFIED PART OF LUNG: Primary | ICD-10-CM

## 2019-02-22 DIAGNOSIS — Z78.9 ATTEMPTING TO CONCEIVE: ICD-10-CM

## 2019-02-22 DIAGNOSIS — R05.9 COUGH: ICD-10-CM

## 2019-02-22 PROCEDURE — 3008F BODY MASS INDEX DOCD: CPT | Performed by: NURSE PRACTITIONER

## 2019-02-22 PROCEDURE — 99203 OFFICE O/P NEW LOW 30 MIN: CPT | Performed by: NURSE PRACTITIONER

## 2019-02-22 PROCEDURE — 1036F TOBACCO NON-USER: CPT | Performed by: NURSE PRACTITIONER

## 2019-03-11 ENCOUNTER — APPOINTMENT (OUTPATIENT)
Dept: RADIOLOGY | Facility: CLINIC | Age: 34
End: 2019-03-11
Payer: COMMERCIAL

## 2019-03-11 DIAGNOSIS — J18.9 PNEUMONIA OF BOTH LUNGS DUE TO INFECTIOUS ORGANISM, UNSPECIFIED PART OF LUNG: ICD-10-CM

## 2019-03-11 PROCEDURE — 71046 X-RAY EXAM CHEST 2 VIEWS: CPT

## 2019-04-22 ENCOUNTER — APPOINTMENT (OUTPATIENT)
Dept: LAB | Facility: HOSPITAL | Age: 34
End: 2019-04-22
Attending: SPECIALIST
Payer: COMMERCIAL

## 2019-04-22 ENCOUNTER — TRANSCRIBE ORDERS (OUTPATIENT)
Dept: ADMINISTRATIVE | Facility: HOSPITAL | Age: 34
End: 2019-04-22

## 2019-04-22 DIAGNOSIS — N91.2 AMENORRHEA: Primary | ICD-10-CM

## 2019-04-22 DIAGNOSIS — N91.2 AMENORRHEA: ICD-10-CM

## 2019-04-22 LAB — HCG SERPL QL: NEGATIVE

## 2019-04-22 PROCEDURE — 36415 COLL VENOUS BLD VENIPUNCTURE: CPT

## 2019-04-22 PROCEDURE — 84703 CHORIONIC GONADOTROPIN ASSAY: CPT

## 2019-06-04 ENCOUNTER — OFFICE VISIT (OUTPATIENT)
Dept: URGENT CARE | Facility: CLINIC | Age: 34
End: 2019-06-04
Payer: COMMERCIAL

## 2019-06-04 VITALS
RESPIRATION RATE: 16 BRPM | WEIGHT: 238 LBS | HEART RATE: 80 BPM | DIASTOLIC BLOOD PRESSURE: 70 MMHG | HEIGHT: 62 IN | TEMPERATURE: 97.2 F | BODY MASS INDEX: 43.79 KG/M2 | SYSTOLIC BLOOD PRESSURE: 112 MMHG | OXYGEN SATURATION: 98 %

## 2019-06-04 DIAGNOSIS — R21 RASH: Primary | ICD-10-CM

## 2019-06-04 PROCEDURE — 99213 OFFICE O/P EST LOW 20 MIN: CPT | Performed by: PHYSICIAN ASSISTANT

## 2019-06-04 PROCEDURE — S9088 SERVICES PROVIDED IN URGENT: HCPCS | Performed by: PHYSICIAN ASSISTANT

## 2019-06-04 RX ORDER — PREDNISONE 10 MG/1
TABLET ORAL
Qty: 30 TABLET | Refills: 0 | Status: SHIPPED | OUTPATIENT
Start: 2019-06-04 | End: 2019-06-25

## 2019-06-25 ENCOUNTER — OFFICE VISIT (OUTPATIENT)
Dept: URGENT CARE | Facility: CLINIC | Age: 34
End: 2019-06-25
Payer: COMMERCIAL

## 2019-06-25 VITALS
HEIGHT: 62 IN | WEIGHT: 237 LBS | TEMPERATURE: 98.7 F | BODY MASS INDEX: 43.61 KG/M2 | DIASTOLIC BLOOD PRESSURE: 82 MMHG | SYSTOLIC BLOOD PRESSURE: 110 MMHG | RESPIRATION RATE: 18 BRPM | HEART RATE: 84 BPM | OXYGEN SATURATION: 98 %

## 2019-06-25 DIAGNOSIS — L23.7 POISON IVY: Primary | ICD-10-CM

## 2019-06-25 PROCEDURE — 99213 OFFICE O/P EST LOW 20 MIN: CPT | Performed by: PHYSICIAN ASSISTANT

## 2019-06-25 PROCEDURE — S9088 SERVICES PROVIDED IN URGENT: HCPCS | Performed by: PHYSICIAN ASSISTANT

## 2019-06-25 RX ORDER — PREDNISONE 10 MG/1
TABLET ORAL
Qty: 30 TABLET | Refills: 0 | Status: SHIPPED | OUTPATIENT
Start: 2019-06-25 | End: 2019-08-06

## 2019-08-06 ENCOUNTER — OFFICE VISIT (OUTPATIENT)
Dept: FAMILY MEDICINE CLINIC | Facility: CLINIC | Age: 34
End: 2019-08-06
Payer: COMMERCIAL

## 2019-08-06 VITALS
DIASTOLIC BLOOD PRESSURE: 78 MMHG | HEART RATE: 73 BPM | SYSTOLIC BLOOD PRESSURE: 132 MMHG | OXYGEN SATURATION: 98 % | WEIGHT: 238 LBS | RESPIRATION RATE: 18 BRPM | HEIGHT: 62 IN | BODY MASS INDEX: 43.79 KG/M2

## 2019-08-06 DIAGNOSIS — L23.7 CONTACT DERMATITIS DUE TO POISON SUMAC: Primary | ICD-10-CM

## 2019-08-06 PROCEDURE — 99213 OFFICE O/P EST LOW 20 MIN: CPT | Performed by: NURSE PRACTITIONER

## 2019-08-06 PROCEDURE — 1036F TOBACCO NON-USER: CPT | Performed by: NURSE PRACTITIONER

## 2019-08-06 PROCEDURE — 3008F BODY MASS INDEX DOCD: CPT | Performed by: NURSE PRACTITIONER

## 2019-08-06 RX ORDER — METHYLPREDNISOLONE 4 MG/1
TABLET ORAL
Qty: 21 EACH | Refills: 0 | Status: SHIPPED | OUTPATIENT
Start: 2019-08-06 | End: 2020-02-03

## 2019-08-06 NOTE — PROGRESS NOTES
Assessment/Plan:       Diagnoses and all orders for this visit:    Contact dermatitis due to poison sumac  -     methylPREDNISolone 4 MG tablet therapy pack; Use as directed on package        No problem-specific Assessment & Plan notes found for this encounter  Subjective:      Patient ID: Denzel Abdi is a 29 y o  female  Patient is here with itchy red rash on left upper arm  She has had poison exposure and she has had dermatitis two other times this summer  She has been applying calamine lotion  The following portions of the patient's history were reviewed and updated as appropriate:   She has a past medical history of Elevated cholesterol  ,  does not have any pertinent problems on file  ,   has a past surgical history that includes  section and Dilation and curettage of uterus  ,  family history includes Diabetes in her mother  ,   reports that she has never smoked  She has never used smokeless tobacco  She reports that she does not drink alcohol or use drugs  ,  is allergic to penicillins; zithromax [azithromycin]; and clindamycin     Current Outpatient Medications   Medication Sig Dispense Refill    albuterol (PROVENTIL HFA,VENTOLIN HFA) 90 mcg/act inhaler Inhale 2 puffs every 4 (four) hours as needed for wheezing 1 Inhaler 0    cetirizine (ZyrTEC) 10 mg tablet Take 10 mg by mouth as needed       ibuprofen (MOTRIN) 400 mg tablet Take 1 tablet (400 mg total) by mouth every 6 (six) hours as needed for mild pain for up to 5 days 20 tablet 0    methylPREDNISolone 4 MG tablet therapy pack Use as directed on package 21 each 0     No current facility-administered medications for this visit  Review of Systems   Constitutional: Negative  Negative for fatigue and fever  HENT: Negative  Negative for congestion  Eyes: Negative  Negative for visual disturbance  Respiratory: Negative for cough, chest tightness, shortness of breath and wheezing  Cardiovascular: Negative  Gastrointestinal: Negative  Negative for abdominal pain, blood in stool, diarrhea and nausea  Endocrine: Negative for polydipsia, polyphagia and polyuria  Genitourinary: Negative for difficulty urinating and flank pain  Musculoskeletal: Negative  Negative for arthralgias, back pain and myalgias  Skin: Positive for rash  Negative for color change and pallor  Left upper arm rash   Allergic/Immunologic: Negative for immunocompromised state  Neurological: Negative  Negative for dizziness, weakness, light-headedness, numbness and headaches  Hematological: Negative for adenopathy  Psychiatric/Behavioral: Negative  Negative for confusion, decreased concentration and sleep disturbance  All other systems reviewed and are negative  Objective:  Vitals:    08/06/19 1203   BP: 132/78   BP Location: Left arm   Patient Position: Sitting   Pulse: 73   Resp: 18   SpO2: 98%   Weight: 108 kg (238 lb)   Height: 5' 1 5" (1 562 m)     Body mass index is 44 24 kg/m²  Physical Exam   Constitutional: She is oriented to person, place, and time  She appears well-developed and well-nourished  No distress  HENT:   Head: Normocephalic and atraumatic  Nose: Nose normal    Mouth/Throat: No oropharyngeal exudate  Eyes: Pupils are equal, round, and reactive to light  Conjunctivae are normal    Neck: Normal range of motion  Neck supple  Cardiovascular: Normal rate and regular rhythm  Exam reveals no gallop and no friction rub  No murmur heard  Pulmonary/Chest: Effort normal  No respiratory distress  She has no wheezes  She has no rales  Abdominal: Soft  Musculoskeletal: Normal range of motion  Lymphadenopathy:     She has no cervical adenopathy  Neurological: She is alert and oriented to person, place, and time  Skin: Skin is warm and dry  Rash noted  She is not diaphoretic  There is erythema  Left upper arm poison sumac rash   Psychiatric: She has a normal mood and affect   Her behavior is normal  Judgment and thought content normal    Nursing note and vitals reviewed

## 2019-11-05 ENCOUNTER — TRANSCRIBE ORDERS (OUTPATIENT)
Dept: LAB | Facility: HOSPITAL | Age: 34
End: 2019-11-05

## 2019-11-05 ENCOUNTER — APPOINTMENT (OUTPATIENT)
Dept: LAB | Facility: HOSPITAL | Age: 34
End: 2019-11-05
Attending: SPECIALIST
Payer: COMMERCIAL

## 2019-11-05 DIAGNOSIS — N91.2 AMENORRHEA: ICD-10-CM

## 2019-11-05 DIAGNOSIS — N91.2 AMENORRHEA: Primary | ICD-10-CM

## 2019-11-05 LAB — HCG SERPL QL: NEGATIVE

## 2019-11-05 PROCEDURE — 84703 CHORIONIC GONADOTROPIN ASSAY: CPT

## 2019-11-05 PROCEDURE — 36415 COLL VENOUS BLD VENIPUNCTURE: CPT

## 2020-02-03 ENCOUNTER — OFFICE VISIT (OUTPATIENT)
Dept: FAMILY MEDICINE CLINIC | Facility: CLINIC | Age: 35
End: 2020-02-03
Payer: COMMERCIAL

## 2020-02-03 VITALS
SYSTOLIC BLOOD PRESSURE: 118 MMHG | TEMPERATURE: 98.4 F | BODY MASS INDEX: 44.16 KG/M2 | HEART RATE: 77 BPM | DIASTOLIC BLOOD PRESSURE: 70 MMHG | WEIGHT: 240 LBS | RESPIRATION RATE: 20 BRPM | OXYGEN SATURATION: 98 % | HEIGHT: 62 IN

## 2020-02-03 DIAGNOSIS — J01.00 ACUTE NON-RECURRENT MAXILLARY SINUSITIS: Primary | ICD-10-CM

## 2020-02-03 PROCEDURE — 1036F TOBACCO NON-USER: CPT | Performed by: NURSE PRACTITIONER

## 2020-02-03 PROCEDURE — 99214 OFFICE O/P EST MOD 30 MIN: CPT | Performed by: NURSE PRACTITIONER

## 2020-02-03 RX ORDER — CIPROFLOXACIN 500 MG/1
500 TABLET, FILM COATED ORAL EVERY 12 HOURS SCHEDULED
Qty: 14 TABLET | Refills: 0 | Status: SHIPPED | OUTPATIENT
Start: 2020-02-03 | End: 2020-02-10

## 2020-02-03 NOTE — PROGRESS NOTES
Assessment/Plan:       Diagnoses and all orders for this visit:    Acute non-recurrent maxillary sinusitis  -     ciprofloxacin (CIPRO) 500 mg tablet; Take 1 tablet (500 mg total) by mouth every 12 (twelve) hours for 7 days    BMI 40 0-44 9, adult (HCC)        No problem-specific Assessment & Plan notes found for this encounter  Subjective:      Patient ID: Aidan Weldon is a 36 y o  female  Patient started with headache 5 days ago  Over the past 5 days, she has had worsening congestion, some aches pressure  Cough and sore throat  She has pressure in her right ear      The following portions of the patient's history were reviewed and updated as appropriate:   She has a past medical history of Elevated cholesterol  ,  does not have any pertinent problems on file  ,   has a past surgical history that includes  section and Dilation and curettage of uterus  ,  family history includes Diabetes in her mother  ,   reports that she has never smoked  She has never used smokeless tobacco  She reports that she does not drink alcohol or use drugs  ,  is allergic to penicillins; zithromax [azithromycin]; and clindamycin     Current Outpatient Medications   Medication Sig Dispense Refill    albuterol (PROVENTIL HFA,VENTOLIN HFA) 90 mcg/act inhaler Inhale 2 puffs every 4 (four) hours as needed for wheezing 1 Inhaler 0    cetirizine (ZyrTEC) 10 mg tablet Take 10 mg by mouth as needed       ciprofloxacin (CIPRO) 500 mg tablet Take 1 tablet (500 mg total) by mouth every 12 (twelve) hours for 7 days 14 tablet 0    ibuprofen (MOTRIN) 400 mg tablet Take 1 tablet (400 mg total) by mouth every 6 (six) hours as needed for mild pain for up to 5 days 20 tablet 0     No current facility-administered medications for this visit  Review of Systems   Constitutional: Negative for fatigue and fever  HENT: Positive for congestion, ear pain and sore throat  Negative for postnasal drip, sinus pressure and sinus pain  Respiratory: Positive for cough  Negative for chest tightness and shortness of breath  Cardiovascular: Negative for chest pain and palpitations  Gastrointestinal: Negative for abdominal pain  Skin: Negative for color change, pallor and rash  Allergic/Immunologic: Positive for environmental allergies  Negative for immunocompromised state  Neurological: Positive for headaches  Hematological: Negative for adenopathy  All other systems reviewed and are negative  Objective:  Vitals:    02/03/20 0953   BP: 118/70   BP Location: Left arm   Patient Position: Sitting   Pulse: 77   Resp: 20   Temp: 98 4 °F (36 9 °C)   SpO2: 98%   Weight: 109 kg (240 lb)   Height: 5' 1 5" (1 562 m)     Body mass index is 44 61 kg/m²  Physical Exam   Constitutional: She is oriented to person, place, and time  She appears well-developed and well-nourished  No distress  HENT:   Head: Normocephalic and atraumatic  Right Ear: External ear normal    Left Ear: External ear normal    Nose: Nose normal    Mouth/Throat: Oropharynx is clear and moist  No oropharyngeal exudate  Maxillary sinus tenderness   Eyes: Pupils are equal, round, and reactive to light  Conjunctivae are normal    Neck: Normal range of motion  Neck supple  Anterior cervical tender adenopathy right side   Cardiovascular: Normal rate, regular rhythm and normal heart sounds  Exam reveals no gallop and no friction rub  No murmur heard  Pulmonary/Chest: Effort normal and breath sounds normal  No respiratory distress  She has no wheezes  She has no rales  Abdominal: Soft  Musculoskeletal: Normal range of motion  Lymphadenopathy:     She has cervical adenopathy  Neurological: She is alert and oriented to person, place, and time  Skin: Skin is warm and dry  No rash noted  She is not diaphoretic  Psychiatric: She has a normal mood and affect  Her behavior is normal  Judgment and thought content normal    Nursing note and vitals reviewed  BMI Counseling: Body mass index is 44 61 kg/m²  The BMI is above normal  Nutrition recommendations include decreasing portion sizes, encouraging healthy choices of fruits and vegetables, decreasing fast food intake, consuming healthier snacks, limiting drinks that contain sugar, moderation in carbohydrate intake, increasing intake of lean protein, reducing intake of saturated and trans fat and reducing intake of cholesterol  Exercise recommendations include exercising 3-5 times per week and strength training exercises  No pharmacotherapy was ordered

## 2020-03-09 ENCOUNTER — OFFICE VISIT (OUTPATIENT)
Dept: FAMILY MEDICINE CLINIC | Facility: CLINIC | Age: 35
End: 2020-03-09
Payer: COMMERCIAL

## 2020-03-09 VITALS
OXYGEN SATURATION: 99 % | HEART RATE: 85 BPM | BODY MASS INDEX: 43.79 KG/M2 | SYSTOLIC BLOOD PRESSURE: 118 MMHG | DIASTOLIC BLOOD PRESSURE: 74 MMHG | TEMPERATURE: 97.8 F | HEIGHT: 62 IN | WEIGHT: 238 LBS

## 2020-03-09 DIAGNOSIS — J06.9 VIRAL URI: Primary | ICD-10-CM

## 2020-03-09 DIAGNOSIS — H65.01 NON-RECURRENT ACUTE SEROUS OTITIS MEDIA OF RIGHT EAR: ICD-10-CM

## 2020-03-09 PROCEDURE — 1036F TOBACCO NON-USER: CPT | Performed by: PHYSICIAN ASSISTANT

## 2020-03-09 PROCEDURE — 99213 OFFICE O/P EST LOW 20 MIN: CPT | Performed by: PHYSICIAN ASSISTANT

## 2020-03-09 RX ORDER — FLUTICASONE PROPIONATE 50 MCG
1 SPRAY, SUSPENSION (ML) NASAL DAILY
Qty: 1 BOTTLE | Refills: 0 | Status: SHIPPED | OUTPATIENT
Start: 2020-03-09 | End: 2021-05-28

## 2020-03-09 NOTE — PROGRESS NOTES
Marquita Owens 1985 female MRN: [id number]    Acute Visit        ASSESSMENT/PLAN  Problem List Items Addressed This Visit        Respiratory    Viral URI - Primary     Viral URI with mid ear effusion  Will start flonase  Supportive care with plenty of fluids  May trial OTC mucinex to thin secretions  No evidence of bacterial infection  Relevant Medications    fluticasone (FLONASE) 50 mcg/act nasal spray       Nervous and Auditory    Non-recurrent acute serous otitis media of right ear                No future appointments  SUBJECTIVE  CC: Earache (patient stated that symptoms started about 3 days ago ); Sore Throat; and Migraine       Pt presents to the office today with 3 days of R ear pain, sore throat, sinus pressure and PND  She shares her son is also sick and gets frequent sinus infections  She has not tried any OTC medications  She has not had fevers  Slight cough which she attributes to her PND  No left ear pain  No chills  Endorses "thick" rhinorrhea  Pt afebrile in office  Marquita Owens is a 36 y o  female who presented for an acute visit complaining of  Review of Systems   Constitutional: Negative for chills, fatigue and fever  HENT: Positive for congestion, postnasal drip, rhinorrhea and sinus pressure  Negative for ear pain, hearing loss, nosebleeds, sinus pain, sneezing and sore throat  Eyes: Negative for pain, discharge, itching and visual disturbance  Respiratory: Positive for cough  Negative for chest tightness, shortness of breath and wheezing  Cardiovascular: Negative for chest pain, palpitations and leg swelling  Gastrointestinal: Negative for abdominal pain, blood in stool, constipation, diarrhea, nausea and vomiting  Genitourinary: Negative for frequency and urgency  Neurological: Negative for dizziness, light-headedness and numbness         Historical Information   The patient history was reviewed as follows:  Past Medical History:   Diagnosis Date    Elevated cholesterol      Past Surgical History:   Procedure Laterality Date     SECTION      DILATION AND CURETTAGE OF UTERUS       Family History   Problem Relation Age of Onset    Diabetes Mother       Social History   Social History     Substance and Sexual Activity   Alcohol Use Never    Frequency: Never     Social History     Substance and Sexual Activity   Drug Use Never     Social History     Tobacco Use   Smoking Status Never Smoker   Smokeless Tobacco Never Used       Medications:   Meds/Allergies   Current Outpatient Medications   Medication Sig Dispense Refill    albuterol (PROVENTIL HFA,VENTOLIN HFA) 90 mcg/act inhaler Inhale 2 puffs every 4 (four) hours as needed for wheezing 1 Inhaler 0    cetirizine (ZyrTEC) 10 mg tablet Take 10 mg by mouth as needed       clomiPHENE (CLOMID) 50 mg tablet TAKE 1 TABLET BY MOUTH DAILY DAYS 5 TO 9 OF CYCLE      fluticasone (FLONASE) 50 mcg/act nasal spray 1 spray into each nostril daily 1 Bottle 0     No current facility-administered medications for this visit  Allergies   Allergen Reactions    Penicillins      Family hx of severe reaction - never taken    Zithromax [Azithromycin] GI Intolerance    Clindamycin Rash       OBJECTIVE  Vitals:   Vitals:    20 0848   BP: 118/74   Pulse: 85   Temp: 97 8 °F (36 6 °C)   SpO2: 99%   Weight: 108 kg (238 lb)   Height: 5' 1 5" (1 562 m)       Invasive Devices     None                 Physical Exam   Constitutional: She is oriented to person, place, and time  She appears well-developed and well-nourished  HENT:   Head: Normocephalic and atraumatic  Right Ear: Hearing, external ear and ear canal normal  Tympanic membrane is not erythematous and not bulging  A middle ear effusion is present  Left Ear: Hearing, tympanic membrane, external ear and ear canal normal    Nose: Mucosal edema present     Mouth/Throat: Oropharynx is clear and moist and mucous membranes are normal    Eyes: Pupils are equal, round, and reactive to light  Neck: Normal range of motion  Neck supple  Cardiovascular: Normal rate, regular rhythm and normal heart sounds  Pulmonary/Chest: Effort normal and breath sounds normal  No respiratory distress  Abdominal: Soft  Bowel sounds are normal    Musculoskeletal: Normal range of motion  Neurological: She is alert and oriented to person, place, and time  Skin: Skin is warm and dry  Psychiatric: She has a normal mood and affect  Her behavior is normal    Nursing note and vitals reviewed  Lab:  I have personally reviewed all pertinent results

## 2020-03-09 NOTE — ASSESSMENT & PLAN NOTE
Viral URI with mid ear effusion  Will start flonase  Supportive care with plenty of fluids  May trial OTC mucinex to thin secretions  No evidence of bacterial infection

## 2020-03-11 ENCOUNTER — OFFICE VISIT (OUTPATIENT)
Dept: FAMILY MEDICINE CLINIC | Facility: CLINIC | Age: 35
End: 2020-03-11
Payer: COMMERCIAL

## 2020-03-11 ENCOUNTER — TELEPHONE (OUTPATIENT)
Dept: FAMILY MEDICINE CLINIC | Facility: CLINIC | Age: 35
End: 2020-03-11

## 2020-03-11 VITALS
TEMPERATURE: 96.7 F | WEIGHT: 238.8 LBS | BODY MASS INDEX: 43.94 KG/M2 | HEIGHT: 62 IN | DIASTOLIC BLOOD PRESSURE: 68 MMHG | SYSTOLIC BLOOD PRESSURE: 122 MMHG | HEART RATE: 84 BPM | OXYGEN SATURATION: 98 %

## 2020-03-11 DIAGNOSIS — R09.81 COUGH WITH CONGESTION OF PARANASAL SINUS: Primary | ICD-10-CM

## 2020-03-11 DIAGNOSIS — H65.91 MIDDLE EAR EFFUSION, RIGHT: ICD-10-CM

## 2020-03-11 DIAGNOSIS — J01.00 ACUTE NON-RECURRENT MAXILLARY SINUSITIS: ICD-10-CM

## 2020-03-11 DIAGNOSIS — R05.8 COUGH WITH CONGESTION OF PARANASAL SINUS: Primary | ICD-10-CM

## 2020-03-11 PROCEDURE — 1036F TOBACCO NON-USER: CPT | Performed by: PHYSICIAN ASSISTANT

## 2020-03-11 PROCEDURE — 3008F BODY MASS INDEX DOCD: CPT | Performed by: PHYSICIAN ASSISTANT

## 2020-03-11 PROCEDURE — 99213 OFFICE O/P EST LOW 20 MIN: CPT | Performed by: PHYSICIAN ASSISTANT

## 2020-03-11 RX ORDER — METHYLPREDNISOLONE 4 MG/1
TABLET ORAL
Qty: 21 EACH | Refills: 0 | Status: SHIPPED | OUTPATIENT
Start: 2020-03-11 | End: 2021-02-11 | Stop reason: ALTCHOICE

## 2020-03-11 RX ORDER — SULFAMETHOXAZOLE AND TRIMETHOPRIM 800; 160 MG/1; MG/1
1 TABLET ORAL EVERY 12 HOURS SCHEDULED
Qty: 14 TABLET | Refills: 0 | Status: SHIPPED | OUTPATIENT
Start: 2020-03-11 | End: 2020-03-18

## 2020-03-11 NOTE — ASSESSMENT & PLAN NOTE
Sx ongoing for nearly 10 days  Thick discharge and significant pain  I believe this is likely severe congestion, will order a medrol dose pac to decrease inflammation  Pt given bactrim and told to hold on taking for 2 more days   If sx no better, may take 1 week bactrim

## 2020-03-11 NOTE — PROGRESS NOTES
Jyothi Mccoy 1985 female MRN: [id number]    Acute Visit        ASSESSMENT/PLAN  Problem List Items Addressed This Visit        Respiratory    Acute non-recurrent maxillary sinusitis     Sx ongoing for nearly 10 days  Thick discharge and significant pain  I believe this is likely severe congestion, will order a medrol dose pac to decrease inflammation  Pt given bactrim and told to hold on taking for 2 more days  If sx no better, may take 1 week bactrim         Relevant Medications    sulfamethoxazole-trimethoprim (BACTRIM DS) 800-160 mg per tablet    Cough with congestion of paranasal sinus - Primary    Relevant Medications    methylPREDNISolone 4 MG tablet therapy pack       Other    Middle ear effusion, right     Mid ear effusion, membrane is translucent, intact, non-erythematous and landmarks are clearly visible  There is no evidence of acute middle ear infection  Relevant Medications    methylPREDNISolone 4 MG tablet therapy pack                No future appointments  SUBJECTIVE  CC: Sick (Patient was just seen recently at our office states she is feeling better  )       Pt returns to the office today with complaints of ongoing severe R ear pain, along with sinus congestion and "thick" rhinorrhea  She also shares her PND has worsened  Seen in office 4 days ago, treated for viral URI with mid ear effusion  Given flonase and mucinex  Pt states sx are "10x worse"  Pt remains afebrile  Slight cough  Denies fevers at home  Jyothi Mccoy is a 36 y o  female who presented for an acute visit complaining of  Review of Systems   Constitutional: Negative for chills, fatigue and fever  HENT: Positive for ear pain, rhinorrhea and sinus pain  Negative for congestion, hearing loss, nosebleeds, postnasal drip, sinus pressure, sneezing and sore throat  Eyes: Negative for pain, discharge, itching and visual disturbance     Respiratory: Negative for cough, chest tightness, shortness of breath and wheezing  Cardiovascular: Negative for chest pain, palpitations and leg swelling  Gastrointestinal: Negative for abdominal pain, blood in stool, constipation, diarrhea, nausea and vomiting  Genitourinary: Negative for frequency and urgency  Neurological: Negative for dizziness, light-headedness and numbness  Historical Information   The patient history was reviewed as follows:  Past Medical History:   Diagnosis Date    Elevated cholesterol      Past Surgical History:   Procedure Laterality Date     SECTION      DILATION AND CURETTAGE OF UTERUS       Family History   Problem Relation Age of Onset    Diabetes Mother       Social History   Social History     Substance and Sexual Activity   Alcohol Use Never    Frequency: Never     Social History     Substance and Sexual Activity   Drug Use Never     Social History     Tobacco Use   Smoking Status Never Smoker   Smokeless Tobacco Never Used       Medications:   Meds/Allergies   Current Outpatient Medications   Medication Sig Dispense Refill    albuterol (PROVENTIL HFA,VENTOLIN HFA) 90 mcg/act inhaler Inhale 2 puffs every 4 (four) hours as needed for wheezing 1 Inhaler 0    cetirizine (ZyrTEC) 10 mg tablet Take 10 mg by mouth as needed       clomiPHENE (CLOMID) 50 mg tablet TAKE 1 TABLET BY MOUTH DAILY DAYS 5 TO 9 OF CYCLE      fluticasone (FLONASE) 50 mcg/act nasal spray 1 spray into each nostril daily 1 Bottle 0    methylPREDNISolone 4 MG tablet therapy pack Use as directed on package 21 each 0    sulfamethoxazole-trimethoprim (BACTRIM DS) 800-160 mg per tablet Take 1 tablet by mouth every 12 (twelve) hours for 7 days 14 tablet 0     No current facility-administered medications for this visit          Allergies   Allergen Reactions    Penicillins      Family hx of severe reaction - never taken    Zithromax [Azithromycin] GI Intolerance    Clindamycin Rash       OBJECTIVE  Vitals:   Vitals:    20 1031   BP: 122/68   Pulse: 84 Temp: (!) 96 7 °F (35 9 °C)   SpO2: 98%   Weight: 108 kg (238 lb 12 8 oz)   Height: 5' 1 5" (1 562 m)       Invasive Devices     None                 Physical Exam   Constitutional: She is oriented to person, place, and time  She appears well-developed and well-nourished  HENT:   Head: Normocephalic and atraumatic  Right Ear: Ear canal normal  Tympanic membrane is not perforated and not erythematous  A middle ear effusion is present  Left Ear: Hearing, tympanic membrane and ear canal normal    Nose: Mucosal edema and rhinorrhea present  Right sinus exhibits no maxillary sinus tenderness  Left sinus exhibits no maxillary sinus tenderness  Mouth/Throat: Posterior oropharyngeal erythema present  Eyes: Pupils are equal, round, and reactive to light  Neck: Normal range of motion  Neck supple  Cardiovascular: Normal rate, regular rhythm and normal heart sounds  Pulmonary/Chest: Effort normal and breath sounds normal  No respiratory distress  Abdominal: Soft  Bowel sounds are normal    Musculoskeletal: Normal range of motion  Neurological: She is alert and oriented to person, place, and time  Skin: Skin is warm and dry  Psychiatric: She has a normal mood and affect  Her behavior is normal    Nursing note and vitals reviewed  Lab:  I have personally reviewed all pertinent results

## 2020-03-11 NOTE — TELEPHONE ENCOUNTER
Patient stated that she was seen on Monday and rx'd flonase - she claims that she is ten x's worse - increase in ear pain - productive cough with greenish sputum

## 2020-05-16 LAB — EXTERNAL HIV SCREEN: NORMAL

## 2021-02-06 ENCOUNTER — OFFICE VISIT (OUTPATIENT)
Dept: URGENT CARE | Facility: CLINIC | Age: 36
End: 2021-02-06
Payer: COMMERCIAL

## 2021-02-06 VITALS
OXYGEN SATURATION: 99 % | SYSTOLIC BLOOD PRESSURE: 125 MMHG | HEIGHT: 62 IN | WEIGHT: 231 LBS | TEMPERATURE: 97.2 F | HEART RATE: 87 BPM | BODY MASS INDEX: 42.51 KG/M2 | RESPIRATION RATE: 18 BRPM | DIASTOLIC BLOOD PRESSURE: 58 MMHG

## 2021-02-06 DIAGNOSIS — J31.0 RHINITIS, NON-ALLERGIC: Primary | ICD-10-CM

## 2021-02-06 PROCEDURE — 99213 OFFICE O/P EST LOW 20 MIN: CPT | Performed by: FAMILY MEDICINE

## 2021-02-06 PROCEDURE — S9088 SERVICES PROVIDED IN URGENT: HCPCS | Performed by: FAMILY MEDICINE

## 2021-02-06 RX ORDER — PREDNISONE 20 MG/1
20 TABLET ORAL
Qty: 5 TABLET | Refills: 0 | Status: SHIPPED | OUTPATIENT
Start: 2021-02-06 | End: 2021-02-11 | Stop reason: ALTCHOICE

## 2021-02-06 RX ORDER — NORGESTIMATE AND ETHINYL ESTRADIOL 0.25-0.035
1 KIT ORAL DAILY
COMMUNITY
Start: 2021-01-06 | End: 2022-01-06

## 2021-02-06 RX ORDER — FERROUS SULFATE 325(65) MG
325 TABLET ORAL
COMMUNITY
Start: 2020-12-04 | End: 2021-05-28

## 2021-02-06 NOTE — PROGRESS NOTES
[facility]        NAME: Piper Farfan is a 30 y o  female  : 1985    MRN: [id number]  DATE: 2021  TIME: 4:51 PM    Assessment and Plan   Rhinitis, non-allergic [J31 0]  1  Rhinitis, non-allergic  predniSONE 20 mg tablet     No wheezing on exam; lungs are clear  Dyspnea and otalgia likely secondary to rhinitis  Prescribed 5 days of low-dose steroid  Patient Instructions     Follow up with PCP in 3-5 days  Proceed to  ER if symptoms worsen  Chief Complaint     Chief Complaint   Patient presents with    Wheezing     Patient states of symptoms for three days; wheezing started today; chest congestion started yesterday  History of Present Illness       52-year-old female presents today due to wheezing and dyspnea which has progressively worsened over the past 3-4 days  Started office URI symptoms, but denies coughing  Has never been officially diagnosed with asthma but has been treated for asthma/bronchitis  Denies any obvious fevers, chills, abdominal pain, nausea or dizziness  No obvious sick contacts prior to the onset of her symptoms  Review of Systems   Review of Systems   Constitutional: Negative for chills and fever  Respiratory: Positive for chest tightness, shortness of breath and wheezing  Negative for cough  Cardiovascular: Negative for chest pain  Gastrointestinal: Negative for abdominal pain and nausea  Neurological: Negative for dizziness and headaches           Current Medications       Current Outpatient Medications:     ferrous sulfate 325 (65 Fe) mg tablet, Take 325 mg by mouth, Disp: , Rfl:     norgestimate-ethinyl estradiol (ORTHO-CYCLEN) 0 25-35 MG-MCG per tablet, Take 1 tablet by mouth daily, Disp: , Rfl:     Pyridoxine HCl (VITAMIN B6 PO), Take by mouth daily, Disp: , Rfl:     albuterol (PROVENTIL HFA,VENTOLIN HFA) 90 mcg/act inhaler, Inhale 2 puffs every 4 (four) hours as needed for wheezing (Patient not taking: Reported on 2021), Disp: 1 Inhaler, Rfl: 0    cetirizine (ZyrTEC) 10 mg tablet, Take 10 mg by mouth as needed , Disp: , Rfl:     clomiPHENE (CLOMID) 50 mg tablet, TAKE 1 TABLET BY MOUTH DAILY DAYS 5 TO 9 OF CYCLE, Disp: , Rfl:     fluticasone (FLONASE) 50 mcg/act nasal spray, 1 spray into each nostril daily, Disp: 1 Bottle, Rfl: 0    methylPREDNISolone 4 MG tablet therapy pack, Use as directed on package (Patient not taking: Reported on 2021), Disp: 21 each, Rfl: 0    predniSONE 20 mg tablet, Take 1 tablet (20 mg total) by mouth daily in the early morning for 5 days, Disp: 5 tablet, Rfl: 0    Current Allergies     Allergies as of 2021 - Reviewed 2021   Allergen Reaction Noted    Penicillins  2017    Zithromax [azithromycin] GI Intolerance 2017    Clindamycin Rash 2017            The following portions of the patient's history were reviewed and updated as appropriate: allergies, current medications, past family history, past medical history, past social history, past surgical history and problem list      Past Medical History:   Diagnosis Date    Elevated cholesterol        Past Surgical History:   Procedure Laterality Date     SECTION      DILATION AND CURETTAGE OF UTERUS         Family History   Problem Relation Age of Onset    Diabetes Mother          Medications have been verified  Objective   /58   Pulse 87   Temp (!) 97 2 °F (36 2 °C) (Temporal)   Resp 18   Ht 5' 2" (1 575 m)   Wt 105 kg (231 lb)   LMP 2021   SpO2 99%   BMI 42 25 kg/m²   Patient's last menstrual period was 2021  Physical Exam     Physical Exam  Vitals signs and nursing note reviewed  Constitutional:       General: She is not in acute distress  Appearance: Normal appearance  She is obese  She is not ill-appearing, toxic-appearing or diaphoretic  HENT:      Head: Normocephalic and atraumatic     Eyes:      Conjunctiva/sclera: Conjunctivae normal  Pulmonary:      Effort: Pulmonary effort is normal    Skin:     General: Skin is warm  Findings: No erythema  Neurological:      General: No focal deficit present  Mental Status: She is alert and oriented to person, place, and time  Psychiatric:         Mood and Affect: Mood normal          Behavior: Behavior normal          Thought Content:  Thought content normal          Judgment: Judgment normal

## 2021-02-11 ENCOUNTER — TELEMEDICINE (OUTPATIENT)
Dept: FAMILY MEDICINE CLINIC | Facility: CLINIC | Age: 36
End: 2021-02-11
Payer: COMMERCIAL

## 2021-02-11 DIAGNOSIS — Z03.818 ENCOUNTER FOR OBSERVATION FOR SUSPECTED EXPOSURE TO OTHER BIOLOGICAL AGENTS RULED OUT: ICD-10-CM

## 2021-02-11 DIAGNOSIS — J01.00 ACUTE NON-RECURRENT MAXILLARY SINUSITIS: Primary | ICD-10-CM

## 2021-02-11 DIAGNOSIS — B34.9 VIRAL INFECTION, UNSPECIFIED: ICD-10-CM

## 2021-02-11 PROBLEM — R09.81 COUGH WITH CONGESTION OF PARANASAL SINUS: Status: RESOLVED | Noted: 2020-03-11 | Resolved: 2021-02-11

## 2021-02-11 PROBLEM — H65.01 NON-RECURRENT ACUTE SEROUS OTITIS MEDIA OF RIGHT EAR: Status: RESOLVED | Noted: 2020-03-09 | Resolved: 2021-02-11

## 2021-02-11 PROBLEM — R05.8 COUGH WITH CONGESTION OF PARANASAL SINUS: Status: RESOLVED | Noted: 2020-03-11 | Resolved: 2021-02-11

## 2021-02-11 PROBLEM — J18.9 PNEUMONIA OF BOTH LUNGS DUE TO INFECTIOUS ORGANISM: Status: RESOLVED | Noted: 2019-02-22 | Resolved: 2021-02-11

## 2021-02-11 PROBLEM — J06.9 VIRAL URI: Status: RESOLVED | Noted: 2020-03-09 | Resolved: 2021-02-11

## 2021-02-11 PROBLEM — R05.9 COUGH: Status: RESOLVED | Noted: 2019-02-22 | Resolved: 2021-02-11

## 2021-02-11 PROBLEM — H65.91 MIDDLE EAR EFFUSION, RIGHT: Status: RESOLVED | Noted: 2020-03-11 | Resolved: 2021-02-11

## 2021-02-11 PROBLEM — Z78.9 ATTEMPTING TO CONCEIVE: Status: RESOLVED | Noted: 2019-02-22 | Resolved: 2021-02-11

## 2021-02-11 LAB — SARS-COV-2 RNA RESP QL NAA+PROBE: POSITIVE

## 2021-02-11 PROCEDURE — U0003 INFECTIOUS AGENT DETECTION BY NUCLEIC ACID (DNA OR RNA); SEVERE ACUTE RESPIRATORY SYNDROME CORONAVIRUS 2 (SARS-COV-2) (CORONAVIRUS DISEASE [COVID-19]), AMPLIFIED PROBE TECHNIQUE, MAKING USE OF HIGH THROUGHPUT TECHNOLOGIES AS DESCRIBED BY CMS-2020-01-R: HCPCS | Performed by: NURSE PRACTITIONER

## 2021-02-11 PROCEDURE — U0005 INFEC AGEN DETEC AMPLI PROBE: HCPCS | Performed by: NURSE PRACTITIONER

## 2021-02-11 PROCEDURE — 99214 OFFICE O/P EST MOD 30 MIN: CPT | Performed by: NURSE PRACTITIONER

## 2021-02-11 RX ORDER — DOXYCYCLINE HYCLATE 100 MG/1
100 CAPSULE ORAL EVERY 12 HOURS SCHEDULED
Qty: 14 CAPSULE | Refills: 0 | Status: SHIPPED | OUTPATIENT
Start: 2021-02-11 | End: 2021-02-18

## 2021-02-11 NOTE — PROGRESS NOTES
COVID-19 Virtual Visit     Assessment/Plan:    Problem List Items Addressed This Visit        Respiratory    Acute non-recurrent maxillary sinusitis - Primary    Relevant Medications    doxycycline hyclate (VIBRAMYCIN) 100 mg capsule       Other    Encounter for observation for suspected exposure to other biological agents ruled out    Relevant Orders    Novel Coronavirus (Covid-19),PCR [facility] - Collected at [place] [address] or [facility]    Viral infection, unspecified    Relevant Orders    Novel Coronavirus (Covid-19),PCR [facility] - Collected at [place] [address] or [facility]         Disposition:     I recommended the patient to come to our office to perform PCR testing for COVID-19  I have spent 10 minutes directly with the patient  Greater than 50% of this time was spent in counseling/coordination of care regarding: patient and family education, importance of treatment compliance and risk factor reductions  Encounter provider HERNÁN Fuentes    Provider located at [address]  [address] [place] [id number]    Recent Visits  No visits were found meeting these conditions  Showing recent visits within past 7 days and meeting all other requirements     Today's Visits  Date Type Provider Dept   02/11/21 Telemedicine HERNÁN Fuentes [facility]   Showing today's visits and meeting all other requirements     Future Appointments  No visits were found meeting these conditions  Showing future appointments within next 150 days and meeting all other requirements      This virtual check-in was done via [employer] and patient was informed that this is not a secure, HIPAA-compliant platform  She agrees to proceed  Patient agrees to participate in a virtual check in via telephone or video visit instead of presenting to the office to address urgent/immediate medical needs  Patient is aware this is a billable service      After connecting through [facility], the patient was identified by name and date of birth  Tony Le was informed that this was a telemedicine visit and that the exam was being conducted confidentially over secure lines  My office door was closed  No one else was in the room  Tony Le acknowledged consent and understanding of privacy and security of the telemedicine visit  I informed the patient that I have reviewed her record in Epic and presented the opportunity for her to ask any questions regarding the visit today  The patient agreed to participate  Subjective:   Tony Le is a 30 y o  female who is concerned about COVID-19  Patient's symptoms include nasal congestion, rhinorrhea, anosmia, cough, chest tightness and headache  Patient denies fever, chills, fatigue, malaise, sore throat, loss of taste, shortness of breath, abdominal pain, nausea, vomiting, diarrhea and myalgias       Date of symptom onset: 2/2/2021    Exposure:   Contact with a person who is under investigation (PUI) for or who is positive for COVID-19 within the last 14 days?: No    Hospitalized recently for fever and/or lower respiratory symptoms?: No      Currently a healthcare worker that is involved in direct patient care?: No      Works in a special setting where the risk of COVID-19 transmission may be high? (this may include long-term care, correctional and nursing home facilities; homeless shelters; assisted-living facilities and group homes ): No      Resident in a special setting where the risk of COVID-19 transmission may be high? (this may include long-term care, correctional and nursing home facilities; homeless shelters; assisted-living facilities and group homes ): No      Patient went to walk in center and had evaluation and was prescribed steroids and inhaler and no x-ray was checked     No results found for: YAMILE Lopez, Marg[address]  Past Medical History:   Diagnosis Date    Elevated cholesterol      Past Surgical History: Procedure Laterality Date     SECTION      DILATION AND CURETTAGE OF UTERUS       Current Outpatient Medications   Medication Sig Dispense Refill    cetirizine (ZyrTEC) 10 mg tablet Take 10 mg by mouth as needed       ferrous sulfate 325 (65 Fe) mg tablet Take 325 mg by mouth      fluticasone (FLONASE) 50 mcg/act nasal spray 1 spray into each nostril daily 1 Bottle 0    norgestimate-ethinyl estradiol (ORTHO-CYCLEN) 0 25-35 MG-MCG per tablet Take 1 tablet by mouth daily      Pyridoxine HCl (VITAMIN B6 PO) Take by mouth daily      clomiPHENE (CLOMID) 50 mg tablet TAKE 1 TABLET BY MOUTH DAILY DAYS 5 TO 9 OF CYCLE      doxycycline hyclate (VIBRAMYCIN) 100 mg capsule Take 1 capsule (100 mg total) by mouth every 12 (twelve) hours for 7 days 14 capsule 0     No current facility-administered medications for this visit  Allergies   Allergen Reactions    Penicillins      Family hx of severe reaction - never taken    Zithromax [Azithromycin] GI Intolerance    Clindamycin Rash       Review of Systems   Constitutional: Negative for chills, fatigue and fever  HENT: Positive for congestion and rhinorrhea  Negative for sore throat  Respiratory: Positive for cough and chest tightness  Negative for shortness of breath  Gastrointestinal: Negative for abdominal pain, diarrhea, nausea and vomiting  Musculoskeletal: Negative for myalgias  Neurological: Positive for headaches  Objective:    Vitals:       Physical Exam  Constitutional:       Appearance: Normal appearance  HENT:      Head: Normocephalic and atraumatic  Mouth/Throat:      Mouth: Mucous membranes are moist    Eyes:      Extraocular Movements: Extraocular movements intact  Conjunctiva/sclera: Conjunctivae normal       Pupils: Pupils are equal, round, and reactive to light  Pulmonary:      Effort: Pulmonary effort is normal       Breath sounds: Normal breath sounds  Abdominal:      Palpations: Abdomen is soft  Musculoskeletal: Normal range of motion  Skin:     General: Skin is warm  Capillary Refill: Capillary refill takes less than 2 seconds  Neurological:      Mental Status: She is alert and oriented to person, place, and time  Psychiatric:         Mood and Affect: Mood normal          Behavior: Behavior normal          Thought Content: Thought content normal          Judgment: Judgment normal        VIRTUAL VISIT DISCLAIMER    Harriet Chavez acknowledges that she has consented to an online visit or consultation  She understands that the online visit is based solely on information provided by her, and that, in the absence of a face-to-face physical evaluation by the physician, the diagnosis she receives is both limited and provisional in terms of accuracy and completeness  This is not intended to replace a full medical face-to-face evaluation by the physician  Jaime Berrios understands and accepts these terms

## 2021-02-11 NOTE — PROGRESS NOTES
Virtual Regular Visit      Assessment/Plan:    Problem List Items Addressed This Visit        Respiratory    Acute non-recurrent maxillary sinusitis - Primary               Reason for visit is   Chief Complaint   Patient presents with    Virtual Regular Visit     follow up after urgent care visit- feeling worse    Virtual Regular Visit        Encounter provider HERNÁN Gentile    Provider located at [place] [address] [address]  [address] [place] [phone]      Recent Visits  No visits were found meeting these conditions  Showing recent visits within past 7 days and meeting all other requirements     Today's Visits  Date Type Provider Dept   21 Telemedicine HERNÁN Gentile [facility]   Showing today's visits and meeting all other requirements     Future Appointments  No visits were found meeting these conditions  Showing future appointments within next 150 days and meeting all other requirements        The patient was identified by name and date of birth  Luis Angel Velasco was informed that this is a telemedicine visit and that the visit is being conducted through [employer] and patient was informed that this is not a secure, HIPAA-compliant platform  She agrees to proceed     My office door was closed  No one else was in the room  She acknowledged consent and understanding of privacy and security of the video platform  The patient has agreed to participate and understands they can discontinue the visit at any time  Patient is aware this is a billable service  Sujatha Barnes is a 30 y o  female          HPI     Past Medical History:   Diagnosis Date    Elevated cholesterol        Past Surgical History:   Procedure Laterality Date     SECTION      DILATION AND CURETTAGE OF UTERUS         Current Outpatient Medications   Medication Sig Dispense Refill    cetirizine (ZyrTEC) 10 mg tablet Take 10 mg by mouth as needed  ferrous sulfate 325 (65 Fe) mg tablet Take 325 mg by mouth      fluticasone (FLONASE) 50 mcg/act nasal spray 1 spray into each nostril daily 1 Bottle 0    norgestimate-ethinyl estradiol (ORTHO-CYCLEN) 0 25-35 MG-MCG per tablet Take 1 tablet by mouth daily      Pyridoxine HCl (VITAMIN B6 PO) Take by mouth daily      clomiPHENE (CLOMID) 50 mg tablet TAKE 1 TABLET BY MOUTH DAILY DAYS 5 TO 9 OF CYCLE       No current facility-administered medications for this visit  Allergies   Allergen Reactions    Penicillins      Family hx of severe reaction - never taken    Zithromax [Azithromycin] GI Intolerance    Clindamycin Rash       Review of Systems    Video Exam    Vitals:       Physical Exam     I spent 15 minutes directly with the patient during this visit      Albaro Fernandes acknowledges that she has consented to an online visit or consultation  She understands that the online visit is based solely on information provided by her, and that, in the absence of a face-to-face physical evaluation by the physician, the diagnosis she receives is both limited and provisional in terms of accuracy and completeness  This is not intended to replace a full medical face-to-face evaluation by the physician  Susan Quesada understands and accepts these terms

## 2021-02-12 ENCOUNTER — TELEMEDICINE (OUTPATIENT)
Dept: FAMILY MEDICINE CLINIC | Facility: CLINIC | Age: 36
End: 2021-02-12
Payer: COMMERCIAL

## 2021-02-12 ENCOUNTER — APPOINTMENT (OUTPATIENT)
Dept: RADIOLOGY | Facility: CLINIC | Age: 36
End: 2021-02-12
Payer: COMMERCIAL

## 2021-02-12 ENCOUNTER — OFFICE VISIT (OUTPATIENT)
Dept: URGENT CARE | Facility: CLINIC | Age: 36
End: 2021-02-12
Payer: COMMERCIAL

## 2021-02-12 VITALS
HEART RATE: 89 BPM | BODY MASS INDEX: 42.51 KG/M2 | OXYGEN SATURATION: 99 % | WEIGHT: 231 LBS | HEIGHT: 62 IN | TEMPERATURE: 97.9 F | RESPIRATION RATE: 22 BRPM

## 2021-02-12 VITALS — TEMPERATURE: 98.2 F

## 2021-02-12 DIAGNOSIS — U07.1 COVID-19: Primary | ICD-10-CM

## 2021-02-12 DIAGNOSIS — U07.1 COVID-19: ICD-10-CM

## 2021-02-12 PROBLEM — B34.9 VIRAL INFECTION, UNSPECIFIED: Status: RESOLVED | Noted: 2021-02-11 | Resolved: 2021-02-12

## 2021-02-12 PROBLEM — J01.00 ACUTE NON-RECURRENT MAXILLARY SINUSITIS: Status: RESOLVED | Noted: 2020-02-03 | Resolved: 2021-02-12

## 2021-02-12 PROCEDURE — 99212 OFFICE O/P EST SF 10 MIN: CPT | Performed by: FAMILY MEDICINE

## 2021-02-12 PROCEDURE — 71046 X-RAY EXAM CHEST 2 VIEWS: CPT

## 2021-02-12 PROCEDURE — S9088 SERVICES PROVIDED IN URGENT: HCPCS | Performed by: PHYSICIAN ASSISTANT

## 2021-02-12 PROCEDURE — 99213 OFFICE O/P EST LOW 20 MIN: CPT | Performed by: PHYSICIAN ASSISTANT

## 2021-02-12 RX ORDER — ALBUTEROL SULFATE 90 UG/1
2 AEROSOL, METERED RESPIRATORY (INHALATION) EVERY 6 HOURS PRN
Qty: 18 G | Refills: 0 | Status: SHIPPED | OUTPATIENT
Start: 2021-02-12 | End: 2022-01-15 | Stop reason: SDUPTHER

## 2021-02-12 NOTE — PROGRESS NOTES
COVID-19 Virtual Visit     Assessment/Plan:    Problem List Items Addressed This Visit        Other    COVID-19 - Primary         Disposition:     I referred patient to one of our COVID-19 Respiratory Clinics  I recommended continued isolation until at least 24 hours have passed since recovery defined as resolution of fever without the use of fever-reducing medications AND improvement in COVID symptoms AND 10 days have passed since onset of symptoms (or 10 days have passed since date of first positive viral diagnostic test for asymptomatic patients)  Needs O2 and lung eval at respiratory clinic today    I have spent 10 minutes directly with the patient  Encounter provider Chinyere Gore MD    Provider located at [place] [address] [address]  [address] [place] [id number]    Recent Visits  Date Type Provider Dept   02/11/21 Telemedicine [facility] [address] recent visits within past 7 days and meeting all other requirements     Today's Visits  Date Type Provider Dept   02/12/21 Idalia Case MD [facility]   Showing today's visits and meeting all other requirements     Future Appointments  No visits were found meeting these conditions  Showing future appointments within next 150 days and meeting all other requirements      This virtual check-in was done via [employer] Du[employer] and patient was informed that this is not a secure, HIPAA-compliant platform  She agrees to proceed  Patient agrees to participate in a virtual check in via telephone or video visit instead of presenting to the office to address urgent/immediate medical needs  Patient is aware this is a billable service  After connecting through [facility], the patient was identified by name and date of birth  Dina Mercado was informed that this was a telemedicine visit and that the exam was being conducted confidentially over secure lines   My office door was closed  No one else was in the room  Piper Farfan acknowledged consent and understanding of privacy and security of the telemedicine visit  I informed the patient that I have reviewed her record in Epic and presented the opportunity for her to ask any questions regarding the visit today  The patient agreed to participate  Subjective:   Piper Farfan is a 30 y o  female who has been screened for COVID-19  Symptom change since last report: worsening  Patient's symptoms include fatigue, malaise, nasal congestion, loss of taste, chest tightness and headache  Patient denies fever, chills, rhinorrhea, sore throat, anosmia, cough, shortness of breath, abdominal pain, nausea, vomiting, diarrhea and myalgias  Anoop Camarena has been staying home and has isolated themselves in her home  She is taking care to not share personal items and is cleaning all surfaces that are touched often, like counters, tabletops, and doorknobs using household cleaning sprays or wipes  She is wearing a mask when she leaves her room  Date of symptom onset: 2021  Date of positive COVID-19 PCR: 2021    She was started on Doxycycline yesterday  Feels like "something punching me in my lungs"  Does not have a pulse ox at home    Did not have a CXR    Lab Results   Component Value Date    SARSCOV2 Positive (A) 2021     Past Medical History:   Diagnosis Date    Elevated cholesterol      Past Surgical History:   Procedure Laterality Date     SECTION      DILATION AND CURETTAGE OF UTERUS       Current Outpatient Medications   Medication Sig Dispense Refill    cetirizine (ZyrTEC) 10 mg tablet Take 10 mg by mouth as needed       doxycycline hyclate (VIBRAMYCIN) 100 mg capsule Take 1 capsule (100 mg total) by mouth every 12 (twelve) hours for 7 days 14 capsule 0    ferrous sulfate 325 (65 Fe) mg tablet Take 325 mg by mouth      fluticasone (FLONASE) 50 mcg/act nasal spray 1 spray into each nostril daily 1 Bottle 0    norgestimate-ethinyl estradiol (ORTHO-CYCLEN) 0 25-35 MG-MCG per tablet Take 1 tablet by mouth daily      Pyridoxine HCl (VITAMIN B6 PO) Take by mouth daily      clomiPHENE (CLOMID) 50 mg tablet TAKE 1 TABLET BY MOUTH DAILY DAYS 5 TO 9 OF CYCLE       No current facility-administered medications for this visit  Allergies   Allergen Reactions    Penicillins      Family hx of severe reaction - never taken    Zithromax [Azithromycin] GI Intolerance    Clindamycin Rash       Review of Systems   Constitutional: Positive for fatigue  Negative for chills and fever  HENT: Positive for congestion  Negative for rhinorrhea and sore throat  Respiratory: Positive for chest tightness  Negative for cough and shortness of breath  Gastrointestinal: Negative for abdominal pain, diarrhea, nausea and vomiting  Musculoskeletal: Negative for myalgias  Neurological: Positive for headaches  Objective:    Vitals:    02/12/21 0946   Temp: 98 2 °F (36 8 °C)       Physical Exam  Vitals signs and nursing note reviewed  Constitutional:       General: She is not in acute distress  Appearance: She is well-developed  She is obese  She is not ill-appearing, toxic-appearing or diaphoretic  HENT:      Head: Normocephalic and atraumatic  Pulmonary:      Effort: Pulmonary effort is normal  No respiratory distress  Neurological:      Mental Status: She is alert  Psychiatric:         Behavior: Behavior normal          Thought Content: Thought content normal          Judgment: Judgment normal        VIRTUAL VISIT DISCLAIMER    Heidi L Terrace Habermann acknowledges that she has consented to an online visit or consultation  She understands that the online visit is based solely on information provided by her, and that, in the absence of a face-to-face physical evaluation by the physician, the diagnosis she receives is both limited and provisional in terms of accuracy and completeness   This is not intended to replace a full medical face-to-face evaluation by the physician  Vangie Pryor understands and accepts these terms

## 2021-02-12 NOTE — PROGRESS NOTES
[facility]        NAME: Kj Nova is a 30 y o  female  : 1985    MRN: [id number]  DATE: 2021  TIME: 12:13 PM    Assessment and Plan   COVID-19 [U07 1]  1  COVID-19  XR chest pa & lateral    albuterol (Ventolin HFA) 90 mcg/act inhaler     CXR per my read is negative for pneumonia   If o2 <90% notify PCP or proceed to ER  Encouraged proning, ambulation, deep breathing  Hourly O2 checks  1 day close follow up with PCP  Recommend vitamins such as D3 2000 IU by mouth daily, Vitamin C 1g by mouth twice daily, and a multivitamin daily  May also use Zinc lozenges   Continue antibiotics as directed by pcp    Patient Instructions     Follow up with PCP in 3-5 days  Proceed to  ER if symptoms worsen  Chief Complaint     Chief Complaint   Patient presents with    COVID-19     Resp clinic  sinus congestion, headache, loss of taste and smell, SOB  History of Present Illness       81-year-old female presents to a respiratory clinic for evaluation  Patient had a visit with her primary care provider today and referred for evaluation  Patient was diagnosed on 2021 with COVID-19  Patient had symptoms starting 2021  Patient was put on doxycycline and for primary care provider yesterday  Patient continues to have right back pain with inspiration  She has no hx of VTE  States she has chest tightness  She does not appear in distress and speaking in full sentences  She denies fever  Review of Systems   Review of Systems   Constitutional: Negative for chills, fatigue and fever  HENT: Positive for congestion  Negative for ear pain, sinus pain, sore throat and trouble swallowing  Eyes: Negative for pain, discharge and redness  Respiratory: Positive for cough, chest tightness and shortness of breath  Negative for wheezing  Cardiovascular: Negative for chest pain, palpitations and leg swelling     Gastrointestinal: Negative for abdominal pain, diarrhea, nausea and vomiting  Musculoskeletal: Negative for arthralgias, joint swelling and myalgias  Skin: Negative for rash  Neurological: Positive for headaches  Negative for dizziness, weakness and numbness  Current Medications       Current Outpatient Medications:     cetirizine (ZyrTEC) 10 mg tablet, Take 10 mg by mouth as needed , Disp: , Rfl:     clomiPHENE (CLOMID) 50 mg tablet, TAKE 1 TABLET BY MOUTH DAILY DAYS 5 TO 9 OF CYCLE, Disp: , Rfl:     doxycycline hyclate (VIBRAMYCIN) 100 mg capsule, Take 1 capsule (100 mg total) by mouth every 12 (twelve) hours for 7 days, Disp: 14 capsule, Rfl: 0    ferrous sulfate 325 (65 Fe) mg tablet, Take 325 mg by mouth, Disp: , Rfl:     fluticasone (FLONASE) 50 mcg/act nasal spray, 1 spray into each nostril daily, Disp: 1 Bottle, Rfl: 0    norgestimate-ethinyl estradiol (ORTHO-CYCLEN) 0 25-35 MG-MCG per tablet, Take 1 tablet by mouth daily, Disp: , Rfl:     Pyridoxine HCl (VITAMIN B6 PO), Take by mouth daily, Disp: , Rfl:     albuterol (Ventolin HFA) 90 mcg/act inhaler, Inhale 2 puffs every 6 (six) hours as needed for wheezing or shortness of breath, Disp: 18 g, Rfl: 0    Current Allergies     Allergies as of 2021 - Reviewed 2021   Allergen Reaction Noted    Penicillins  2017    Zithromax [azithromycin] GI Intolerance 2017    Clindamycin Rash 2017            The following portions of the patient's history were reviewed and updated as appropriate: allergies, current medications, past family history, past medical history, past social history, past surgical history and problem list      Past Medical History:   Diagnosis Date    Elevated cholesterol        Past Surgical History:   Procedure Laterality Date     SECTION      DILATION AND CURETTAGE OF UTERUS         Family History   Problem Relation Age of Onset    Diabetes Mother          Medications have been verified          Objective   Pulse 89   Temp 97 9 °F (36 6 °C) (Temporal)   Resp 22   Ht 5' 2" (1 575 m)   Wt 105 kg (231 lb)   LMP 02/04/2021   SpO2 99%   BMI 42 25 kg/m²        Physical Exam     Physical Exam  Vitals signs and nursing note reviewed  Constitutional:       Appearance: She is well-developed  HENT:      Head: Normocephalic  Right Ear: Hearing and tympanic membrane normal       Left Ear: Hearing and tympanic membrane normal       Nose: No mucosal edema  Mouth/Throat:      Pharynx: Uvula midline  No posterior oropharyngeal erythema  Cardiovascular:      Rate and Rhythm: Normal rate and regular rhythm  Pulmonary:      Effort: Pulmonary effort is normal       Breath sounds: Normal breath sounds

## 2021-02-12 NOTE — PATIENT INSTRUCTIONS
If o2 <90% notify PCP or proceed to ER  Encouraged proning, ambulation, deep breathing  Hourly O2 checks   1 day close follow up with PCP  Recommend vitamins such as D3 2000 IU by mouth daily, Vitamin C 1g by mouth twice daily, and a multivitamin daily  May also use Zinc lozenges   Continue antibiotics as directed by pcp

## 2021-02-19 ENCOUNTER — TELEMEDICINE (OUTPATIENT)
Dept: FAMILY MEDICINE CLINIC | Facility: CLINIC | Age: 36
End: 2021-02-19
Payer: COMMERCIAL

## 2021-02-19 VITALS — TEMPERATURE: 99.5 F

## 2021-02-19 DIAGNOSIS — R21 RASH ASSOCIATED WITH COVID-19: ICD-10-CM

## 2021-02-19 DIAGNOSIS — U07.1 COVID-19: Primary | ICD-10-CM

## 2021-02-19 DIAGNOSIS — U07.1 RASH ASSOCIATED WITH COVID-19: ICD-10-CM

## 2021-02-19 PROCEDURE — 99214 OFFICE O/P EST MOD 30 MIN: CPT | Performed by: NURSE PRACTITIONER

## 2021-02-19 RX ORDER — MULTIVIT WITH MINERALS/LUTEIN
1000 TABLET ORAL DAILY
COMMUNITY
End: 2021-05-28

## 2021-02-19 RX ORDER — METHYLPREDNISOLONE 4 MG/1
TABLET ORAL
Qty: 21 EACH | Refills: 0 | Status: SHIPPED | OUTPATIENT
Start: 2021-02-19 | End: 2021-05-28

## 2021-02-19 NOTE — PROGRESS NOTES
COVID-19 Virtual Visit     Assessment/Plan:    Problem List Items Addressed This Visit        Musculoskeletal and Integument    Rash associated with COVID-19    Relevant Medications    methylPREDNISolone 4 MG tablet therapy pack       Other    COVID-19 - Primary    Relevant Medications    methylPREDNISolone 4 MG tablet therapy pack         Disposition:     Will treat with oral steroid for the rash and using oral benadryl for the itching     I have spent 10 minutes directly with the patient  Greater than 50% of this time was spent in counseling/coordination of care regarding: patient and family education and importance of treatment compliance  Encounter provider HERNÁN Fuentes    Provider located at [address]  [address] [place] [address]    Recent Visits  Date Type Provider Dept   02/12/21 Telemedicine MD Adalberto Vent[facility]   Showing recent visits within past 7 days and meeting all other requirements     Today's Visits  Date Type Provider Dept   02/19/21 Telemedicine HERNÁN Fuentes [facility]   Showing today's visits and meeting all other requirements     Future Appointments  No visits were found meeting these conditions  Showing future appointments within next 150 days and meeting all other requirements      This virtual check-in was done via [employer] and patient was informed that this is not a secure, HIPAA-compliant platform  She agrees to proceed  Patient agrees to participate in a virtual check in via telephone or video visit instead of presenting to the office to address urgent/immediate medical needs  Patient is aware this is a billable service  After connecting through [facility], the patient was identified by name and date of birth  Jaime Berrios was informed that this was a telemedicine visit and that the exam was being conducted confidentially over secure lines  My office door was closed   No one else was in the room  Thais Payne acknowledged consent and understanding of privacy and security of the telemedicine visit  I informed the patient that I have reviewed her record in Epic and presented the opportunity for her to ask any questions regarding the visit today  The patient agreed to participate  Subjective:   Thais Payne is a 30 y o  female who has been screened for COVID-19  Symptom change since last report: resolving  Patient's symptoms include shortness of breath, myalgias and headache  Patient denies fever, chills, fatigue, malaise, congestion, rhinorrhea, sore throat, anosmia, loss of taste, cough, chest tightness, abdominal pain, nausea, vomiting and diarrhea  Ángel Deutsch has been staying home and has isolated themselves in her home  She is taking care to not share personal items and is cleaning all surfaces that are touched often, like counters, tabletops, and doorknobs using household cleaning sprays or wipes  She is wearing a mask when she leaves her room       Date of symptom onset: 2021  Date of positive COVID-19 PCR: 2021    Patient calling and reports that she started with a rash on the side of her face and on the tops of the hands     Lab Results   Component Value Date    SARSCOV2 Positive (A) 2021     Past Medical History:   Diagnosis Date    Elevated cholesterol      Past Surgical History:   Procedure Laterality Date     SECTION      DILATION AND CURETTAGE OF UTERUS       Current Outpatient Medications   Medication Sig Dispense Refill    albuterol (Ventolin HFA) 90 mcg/act inhaler Inhale 2 puffs every 6 (six) hours as needed for wheezing or shortness of breath 18 g 0    Ascorbic Acid (vitamin C) 1000 MG tablet Take 1,000 mg by mouth daily      cetirizine (ZyrTEC) 10 mg tablet Take 10 mg by mouth as needed       Cholecalciferol (Vitamin D3) 125 MCG (5000 UT) TABS Take 5,000 Units by mouth daily      ferrous sulfate 325 (65 Fe) mg tablet Take 325 mg by mouth      Pyridoxine HCl (VITAMIN B6 PO) Take by mouth daily      clomiPHENE (CLOMID) 50 mg tablet TAKE 1 TABLET BY MOUTH DAILY DAYS 5 TO 9 OF CYCLE      fluticasone (FLONASE) 50 mcg/act nasal spray 1 spray into each nostril daily 1 Bottle 0    methylPREDNISolone 4 MG tablet therapy pack Use as directed on package 21 each 0    norgestimate-ethinyl estradiol (ORTHO-CYCLEN) 0 25-35 MG-MCG per tablet Take 1 tablet by mouth daily       No current facility-administered medications for this visit  Allergies   Allergen Reactions    Penicillins      Family hx of severe reaction - never taken    Zithromax [Azithromycin] GI Intolerance    Clindamycin Rash       Review of Systems   Constitutional: Negative for chills, fatigue and fever  HENT: Negative for congestion, rhinorrhea and sore throat  Respiratory: Positive for shortness of breath  Negative for cough and chest tightness  Gastrointestinal: Negative for abdominal pain, diarrhea, nausea and vomiting  Musculoskeletal: Positive for myalgias  Skin: Positive for rash  Neurological: Positive for headaches  Objective:    Vitals:    02/19/21 0959   Temp: 99 5 °F (37 5 °C)       Physical Exam  Vitals signs and nursing note reviewed  Constitutional:       Appearance: Normal appearance  HENT:      Head: Normocephalic and atraumatic  Mouth/Throat:      Mouth: Mucous membranes are moist    Cardiovascular:      Pulses: Normal pulses  Heart sounds: Normal heart sounds  Pulmonary:      Effort: Pulmonary effort is normal       Breath sounds: Normal breath sounds  Abdominal:      Palpations: Abdomen is soft  Skin:         Neurological:      General: No focal deficit present  Mental Status: She is alert and oriented to person, place, and time  Psychiatric:         Mood and Affect: Mood normal        VIRTUAL VISIT DISCLAIMER    Harriet Sparrow acknowledges that she has consented to an online visit or consultation   She understands that the online visit is based solely on information provided by her, and that, in the absence of a face-to-face physical evaluation by the physician, the diagnosis she receives is both limited and provisional in terms of accuracy and completeness  This is not intended to replace a full medical face-to-face evaluation by the physician  Pamela Bowling understands and accepts these terms

## 2021-02-22 ENCOUNTER — TELEPHONE (OUTPATIENT)
Dept: ADMINISTRATIVE | Facility: OTHER | Age: 36
End: 2021-02-22

## 2021-02-22 ENCOUNTER — HOSPITAL ENCOUNTER (EMERGENCY)
Facility: HOSPITAL | Age: 36
Discharge: HOME | End: 2021-02-22
Attending: EMERGENCY MEDICINE | Admitting: EMERGENCY MEDICINE
Payer: COMMERCIAL

## 2021-02-22 ENCOUNTER — APPOINTMENT (EMERGENCY)
Dept: CT IMAGING | Facility: HOSPITAL | Age: 36
End: 2021-02-22
Payer: COMMERCIAL

## 2021-02-22 ENCOUNTER — APPOINTMENT (EMERGENCY)
Dept: RADIOLOGY | Facility: HOSPITAL | Age: 36
End: 2021-02-22
Payer: COMMERCIAL

## 2021-02-22 ENCOUNTER — TELEMEDICINE (OUTPATIENT)
Dept: FAMILY MEDICINE CLINIC | Facility: CLINIC | Age: 36
End: 2021-02-22
Payer: COMMERCIAL

## 2021-02-22 VITALS
TEMPERATURE: 97.6 F | HEIGHT: 62 IN | SYSTOLIC BLOOD PRESSURE: 119 MMHG | OXYGEN SATURATION: 97 % | WEIGHT: 227.2 LBS | HEART RATE: 78 BPM | RESPIRATION RATE: 20 BRPM | DIASTOLIC BLOOD PRESSURE: 75 MMHG | BODY MASS INDEX: 41.81 KG/M2

## 2021-02-22 VITALS — TEMPERATURE: 98.4 F

## 2021-02-22 DIAGNOSIS — U07.1 RASH ASSOCIATED WITH COVID-19: ICD-10-CM

## 2021-02-22 DIAGNOSIS — U07.1 COVID-19: Primary | ICD-10-CM

## 2021-02-22 DIAGNOSIS — R21 RASH ASSOCIATED WITH COVID-19: ICD-10-CM

## 2021-02-22 DIAGNOSIS — R06.02 SOB (SHORTNESS OF BREATH): Primary | ICD-10-CM

## 2021-02-22 LAB
ALBUMIN SERPL BCP-MCNC: 3.4 G/DL (ref 3.5–5)
ALP SERPL-CCNC: 54 U/L (ref 46–116)
ALT SERPL W P-5'-P-CCNC: 35 U/L (ref 12–78)
ANION GAP SERPL CALCULATED.3IONS-SCNC: 12 MMOL/L (ref 4–13)
AST SERPL W P-5'-P-CCNC: 18 U/L (ref 5–45)
BASOPHILS # BLD AUTO: 0.03 THOUSANDS/ΜL (ref 0–0.1)
BASOPHILS NFR BLD AUTO: 0 % (ref 0–1)
BILIRUB SERPL-MCNC: 0.2 MG/DL (ref 0.2–1)
BUN SERPL-MCNC: 22 MG/DL (ref 5–25)
CALCIUM ALBUM COR SERPL-MCNC: 9.4 MG/DL (ref 8.3–10.1)
CALCIUM SERPL-MCNC: 8.9 MG/DL (ref 8.3–10.1)
CHLORIDE SERPL-SCNC: 105 MMOL/L (ref 100–108)
CO2 SERPL-SCNC: 23 MMOL/L (ref 21–32)
CREAT SERPL-MCNC: 1.06 MG/DL (ref 0.6–1.3)
D DIMER PPP FEU-MCNC: 1.01 UG/ML FEU
EOSINOPHIL # BLD AUTO: 0 THOUSAND/ΜL (ref 0–0.61)
EOSINOPHIL NFR BLD AUTO: 0 % (ref 0–6)
ERYTHROCYTE [DISTWIDTH] IN BLOOD BY AUTOMATED COUNT: 16.4 % (ref 11.6–15.1)
GFR SERPL CREATININE-BSD FRML MDRD: 69 ML/MIN/1.73SQ M
GLUCOSE SERPL-MCNC: 113 MG/DL (ref 65–140)
HCG SERPL QL: NEGATIVE
HCT VFR BLD AUTO: 41 % (ref 34.8–46.1)
HGB BLD-MCNC: 13.1 G/DL (ref 11.5–15.4)
IMM GRANULOCYTES # BLD AUTO: 0.03 THOUSAND/UL (ref 0–0.2)
IMM GRANULOCYTES NFR BLD AUTO: 0 % (ref 0–2)
LYMPHOCYTES # BLD AUTO: 1.56 THOUSANDS/ΜL (ref 0.6–4.47)
LYMPHOCYTES NFR BLD AUTO: 18 % (ref 14–44)
MCH RBC QN AUTO: 27.5 PG (ref 26.8–34.3)
MCHC RBC AUTO-ENTMCNC: 32 G/DL (ref 31.4–37.4)
MCV RBC AUTO: 86 FL (ref 82–98)
MONOCYTES # BLD AUTO: 0.5 THOUSAND/ΜL (ref 0.17–1.22)
MONOCYTES NFR BLD AUTO: 6 % (ref 4–12)
NEUTROPHILS # BLD AUTO: 6.48 THOUSANDS/ΜL (ref 1.85–7.62)
NEUTS SEG NFR BLD AUTO: 76 % (ref 43–75)
NRBC BLD AUTO-RTO: 0 /100 WBCS
PLATELET # BLD AUTO: 336 THOUSANDS/UL (ref 149–390)
PMV BLD AUTO: 10.7 FL (ref 8.9–12.7)
POTASSIUM SERPL-SCNC: 4.2 MMOL/L (ref 3.5–5.3)
PROT SERPL-MCNC: 8.4 G/DL (ref 6.4–8.2)
RBC # BLD AUTO: 4.76 MILLION/UL (ref 3.81–5.12)
SODIUM SERPL-SCNC: 140 MMOL/L (ref 136–145)
TROPONIN I SERPL-MCNC: <0.02 NG/ML
WBC # BLD AUTO: 8.6 THOUSAND/UL (ref 4.31–10.16)

## 2021-02-22 PROCEDURE — 71275 CT ANGIOGRAPHY CHEST: CPT

## 2021-02-22 PROCEDURE — 85025 COMPLETE CBC W/AUTO DIFF WBC: CPT | Performed by: EMERGENCY MEDICINE

## 2021-02-22 PROCEDURE — 71045 X-RAY EXAM CHEST 1 VIEW: CPT

## 2021-02-22 PROCEDURE — 99285 EMERGENCY DEPT VISIT HI MDM: CPT

## 2021-02-22 PROCEDURE — 80053 COMPREHEN METABOLIC PANEL: CPT | Performed by: EMERGENCY MEDICINE

## 2021-02-22 PROCEDURE — 93005 ELECTROCARDIOGRAM TRACING: CPT

## 2021-02-22 PROCEDURE — 1036F TOBACCO NON-USER: CPT | Performed by: NURSE PRACTITIONER

## 2021-02-22 PROCEDURE — 99212 OFFICE O/P EST SF 10 MIN: CPT | Performed by: NURSE PRACTITIONER

## 2021-02-22 PROCEDURE — 85379 FIBRIN DEGRADATION QUANT: CPT | Performed by: EMERGENCY MEDICINE

## 2021-02-22 PROCEDURE — 84703 CHORIONIC GONADOTROPIN ASSAY: CPT | Performed by: EMERGENCY MEDICINE

## 2021-02-22 PROCEDURE — 3725F SCREEN DEPRESSION PERFORMED: CPT | Performed by: NURSE PRACTITIONER

## 2021-02-22 PROCEDURE — 99284 EMERGENCY DEPT VISIT MOD MDM: CPT | Performed by: EMERGENCY MEDICINE

## 2021-02-22 PROCEDURE — 36415 COLL VENOUS BLD VENIPUNCTURE: CPT | Performed by: EMERGENCY MEDICINE

## 2021-02-22 PROCEDURE — 84484 ASSAY OF TROPONIN QUANT: CPT | Performed by: EMERGENCY MEDICINE

## 2021-02-22 RX ADMIN — IOHEXOL 100 ML: 350 INJECTION, SOLUTION INTRAVENOUS at 22:10

## 2021-02-22 NOTE — ASSESSMENT & PLAN NOTE
Patient is having some SOB with activities and using her rescue inhaler and is effective offered respiratory clinic declines at this point is similar to other days and will recheck on 2/25/2021

## 2021-02-22 NOTE — TELEPHONE ENCOUNTER
----- Message from Leann Richard MA sent at 2/19/2021 11:54 AM EST -----  Regarding: HIV  02/19/21 11:54 AM    Hello, our patient Norma Monique has had HIV completed/performed  Please assist in updating the patient chart by pulling the Care Everywhere (CE) document  The date of service is 2020       Thank you,  Leann Richard MA  [facility]

## 2021-02-22 NOTE — TELEPHONE ENCOUNTER
Upon review of the In Basket request we were able to locate, review, and update the patient chart as requested for Pap Smear (HPV) aka Cervical Cancer Screening  Any additional questions or concerns should be emailed to the Practice Liaisons via [email or web address] email, please do not reply via In Basket      Thank you  Hieu Grider

## 2021-02-22 NOTE — TELEPHONE ENCOUNTER
----- Message from Michel Rand MA sent at 2/19/2021 11:55 AM EST -----  Regarding: pap  02/19/21 11:55 AM    Larisa, our patient Arabella Knutson has had Pap Smear (HPV) aka Cervical Cancer Screening completed/performed  Please assist in updating the patient chart by pulling the Care Everywhere (CE) document  The date of service is 2021       Thank you,  Michel Rand MA  [facility]

## 2021-02-22 NOTE — TELEPHONE ENCOUNTER
Upon review of the In Basket request we were able to locate, review, and update the patient chart as requested for HIV  Any additional questions or concerns should be emailed to the Practice Liaisons via [email or web address] email, please do not reply via In Basket      Thank you  Hieu Leyva

## 2021-02-22 NOTE — PROGRESS NOTES
COVID-19 Virtual Visit     Assessment/Plan:    Problem List Items Addressed This Visit        Musculoskeletal and Integument    Rash associated with COVID-19     Rash is improving             Other    COVID-19 - Primary     Patient is having some SOB with activities and using her rescue inhaler and is effective offered respiratory clinic declines at this point is similar to other days and will recheck on 2/25/2021              Disposition:     I have spent 10 minutes directly with the patient  Greater than 50% of this time was spent in counseling/coordination of care regarding: importance of treatment compliance, risk factor reductions and impressions  Encounter provider HERNÁN Jose    Provider located at [place] [address]  [address] [place] [id number]    Recent Visits  Date Type Provider Dept   02/19/21 Telemedicine Cecilia Price, [address] [place] [address] Drive recent visits within past 7 days and meeting all other requirements     Today's Visits  Date Type Provider Dept   02/22/21 Telemedicine HERNÁN Jose [facility]   Showing today's visits and meeting all other requirements     Future Appointments  No visits were found meeting these conditions  Showing future appointments within next 150 days and meeting all other requirements        Patient agrees to participate in a virtual check in via telephone or video visit instead of presenting to the office to address urgent/immediate medical needs  Patient is aware this is a billable service  After connecting through Telephone, the patient was identified by name and date of birth  Candida Wesley was informed that this was a telemedicine visit and that the exam was being conducted confidentially over secure lines  My office door was closed  No one else was in the room  Candida Wesley acknowledged consent and understanding of privacy and security of the telemedicine visit   I informed the patient that I have reviewed her record in Epic and presented the opportunity for her to ask any questions regarding the visit today  The patient agreed to participate  Subjective:   Omar Mesa is a 30 y o  female who has been screened for COVID-19  Symptom change since last report: improving  Patient's symptoms include rhinorrhea, cough and shortness of breath  Patient denies fever, chills, fatigue, malaise, congestion, sore throat, anosmia, loss of taste, chest tightness, abdominal pain, nausea, vomiting, diarrhea, myalgias and headaches  Aury Morin has been staying home and has isolated themselves in her home  She is taking care to not share personal items and is cleaning all surfaces that are touched often, like counters, tabletops, and doorknobs using household cleaning sprays or wipes  She is wearing a mask when she leaves her room       Date of symptom onset: 2021  Date of positive COVID-19 PCR: 2021    Rash is improving and medrol pack is helping patient is using her prn inhaler     Lab Results   Component Value Date    SARSCOV2 Positive (A) 2021     Past Medical History:   Diagnosis Date    Elevated cholesterol      Past Surgical History:   Procedure Laterality Date     SECTION      DILATION AND CURETTAGE OF UTERUS       Current Outpatient Medications   Medication Sig Dispense Refill    albuterol (Ventolin HFA) 90 mcg/act inhaler Inhale 2 puffs every 6 (six) hours as needed for wheezing or shortness of breath 18 g 0    Ascorbic Acid (vitamin C) 1000 MG tablet Take 1,000 mg by mouth daily      cetirizine (ZyrTEC) 10 mg tablet Take 10 mg by mouth as needed       Cholecalciferol (Vitamin D3) 125 MCG (5000 UT) TABS Take 5,000 Units by mouth daily      clomiPHENE (CLOMID) 50 mg tablet TAKE 1 TABLET BY MOUTH DAILY DAYS 5 TO 9 OF CYCLE      ferrous sulfate 325 (65 Fe) mg tablet Take 325 mg by mouth      methylPREDNISolone 4 MG tablet therapy pack Use as directed on package 21 each 0    norgestimate-ethinyl estradiol (ORTHO-CYCLEN) 0 25-35 MG-MCG per tablet Take 1 tablet by mouth daily      Pyridoxine HCl (VITAMIN B6 PO) Take by mouth daily      fluticasone (FLONASE) 50 mcg/act nasal spray 1 spray into each nostril daily (Patient not taking: Reported on 2/22/2021) 1 Bottle 0     No current facility-administered medications for this visit  Allergies   Allergen Reactions    Penicillins      Family hx of severe reaction - never taken    Zithromax [Azithromycin] GI Intolerance    Clindamycin Rash       Review of Systems   Constitutional: Negative for chills, fatigue and fever  HENT: Positive for rhinorrhea  Negative for congestion and sore throat  Respiratory: Positive for cough and shortness of breath  Negative for chest tightness  Gastrointestinal: Negative for abdominal pain, diarrhea, nausea and vomiting  Endocrine: Negative  Genitourinary: Negative  Musculoskeletal: Negative for myalgias  Allergic/Immunologic: Negative  Neurological: Negative for headaches  Hematological: Negative  Psychiatric/Behavioral: Negative  Objective:    Vitals:    02/22/21 1251   Temp: 98 4 °F (36 9 °C)       Physical Exam  Constitutional:       Appearance: Normal appearance  HENT:      Head: Normocephalic and atraumatic  Nose: Nose normal       Mouth/Throat:      Mouth: Mucous membranes are moist    Pulmonary:      Effort: Pulmonary effort is normal       Breath sounds: Normal breath sounds  Abdominal:      Palpations: Abdomen is soft  Neurological:      Mental Status: She is alert and oriented to person, place, and time  Psychiatric:         Mood and Affect: Mood normal          Behavior: Behavior normal          Thought Content: Thought content normal          Judgment: Judgment normal        VIRTUAL VISIT DISCLAIMER    Harriet Barrios acknowledges that she has consented to an online visit or consultation   She understands that the online visit is based solely on information provided by her, and that, in the absence of a face-to-face physical evaluation by the physician, the diagnosis she receives is both limited and provisional in terms of accuracy and completeness  This is not intended to replace a full medical face-to-face evaluation by the physician  Estrella Blake understands and accepts these terms

## 2021-02-23 LAB
ATRIAL RATE: 85 BPM
P AXIS: 15 DEGREES
PR INTERVAL: 110 MS
QRS AXIS: 31 DEGREES
QRSD INTERVAL: 84 MS
QT INTERVAL: 366 MS
QTC INTERVAL: 435 MS
T WAVE AXIS: 11 DEGREES
VENTRICULAR RATE: 85 BPM

## 2021-02-23 PROCEDURE — 93010 ELECTROCARDIOGRAM REPORT: CPT | Performed by: INTERNAL MEDICINE

## 2021-02-23 NOTE — DISCHARGE INSTRUCTIONS
Your workup in the emergency department was reassuring  Please make sure to rest, take Tylenol ibuprofen as needed for fevers/muscle aches  Follow-up with your family doctor within 2-3 days  Return to the emergency department for any new or worsening symptoms

## 2021-02-23 NOTE — ED PROVIDER NOTES
Pt Name: Luis Angel Velasco  MRN: [id number]  [place] 1985  Age/Sex: 30 y o  female  Date of evaluation: 2021  PCP: Danny Moreno, [address] [place] [address]    Chief Complaint   Patient presents with    Shortness of Breath     Patient c/o SOB since being diagnosed with Covid on   States today, SOB worsened  HPI    30 y o  female presenting with shortness of breath  Patient tested positive for covid-19 on , state since then she has had some chest pain - describes it as pressure  That has improved, but since yesterday she has had worsening shortness of breath  SOB worse upon exertion  Mentions dry cough and rhinorrhea  No fevers or chills  No myalgias  No calf pain b/l  No hx of dvt/pe  Does not take any daily meds  Has been using albuterol since covid dx, no hx of copd/smoking or asthma  States has to use albuterol more frequently now  Past Medical and Surgical History    Past Medical History:   Diagnosis Date    Elevated cholesterol        Past Surgical History:   Procedure Laterality Date     SECTION      DILATION AND CURETTAGE OF UTERUS         Family History   Problem Relation Age of Onset    Diabetes Mother        Social History     Tobacco Use    Smoking status: Never Smoker    Smokeless tobacco: Never Used   Substance Use Topics    Alcohol use: Never     Frequency: Never    Drug use: Never           Allergies    Allergies   Allergen Reactions    Penicillins      Family hx of severe reaction - never taken    Zithromax [Azithromycin] GI Intolerance    Clindamycin Rash       Home Medications    Prior to Admission medications    Medication Sig Start Date End Date Taking?  Authorizing Provider   albuterol (Ventolin HFA) 90 mcg/act inhaler Inhale 2 puffs every 6 (six) hours as needed for wheezing or shortness of breath 21   Alexandrea Phillips PA-C   Ascorbic Acid (vitamin C) 1000 MG tablet Take 1,000 mg by mouth daily    Historical Provider, MD cetirizine (ZyrTEC) 10 mg tablet Take 10 mg by mouth as needed     Historical Provider, MD   Cholecalciferol (Vitamin D3) 125 MCG (5000 UT) TABS Take 5,000 Units by mouth daily    Historical Provider, MD   clomiPHENE (CLOMID) 50 mg tablet TAKE 1 TABLET BY MOUTH DAILY DAYS 5 TO 9 OF CYCLE 2/26/20   Historical Provider, MD   ferrous sulfate 325 (65 Fe) mg tablet Take 325 mg by mouth 12/4/20 12/4/21  Historical Provider, MD   fluticasone (FLONASE) 50 mcg/act nasal spray 1 spray into each nostril daily  Patient not taking: Reported on 2/22/2021 3/9/20 2/22/21  John Aparicio PA-C   methylPREDNISolone 4 MG tablet therapy pack Use as directed on package 2/19/21   Ree HERNÁN Dhillon   norgestimate-ethinyl estradiol (ORTHO-CYCLEN) 0 25-35 MG-MCG per tablet Take 1 tablet by mouth daily 1/6/21 1/6/22  Historical Provider, MD   Pyridoxine HCl (VITAMIN B6 PO) Take by mouth daily    Historical Provider, MD           Review of Systems    Review of Systems   Constitutional: Negative for chills and fever  HENT: Positive for rhinorrhea  Negative for sore throat  Eyes: Negative for pain and visual disturbance  Respiratory: Positive for cough and shortness of breath  Cardiovascular: Positive for chest pain  Negative for leg swelling  Gastrointestinal: Negative for abdominal pain, nausea and vomiting  Genitourinary: Negative for dysuria and hematuria  Musculoskeletal: Negative for back pain and myalgias  Skin: Negative for rash and wound  Neurological: Negative for syncope and headaches  Physical Exam      ED Triage Vitals [02/22/21 1951]   Temperature Pulse Respirations Blood Pressure SpO2   97 6 °F (36 4 °C) 87 18 142/70 98 %      Temp Source Heart Rate Source Patient Position - Orthostatic VS BP Location FiO2 (%)   Temporal Monitor Sitting Left arm --      Pain Score       --               Physical Exam  Constitutional:       General: She is not in acute distress       Appearance: She is not ill-appearing  HENT:      Head: Normocephalic and atraumatic  Nose: Nose normal    Eyes:      Extraocular Movements: Extraocular movements intact  Pupils: Pupils are equal, round, and reactive to light  Neck:      Musculoskeletal: Normal range of motion and neck supple  Cardiovascular:      Rate and Rhythm: Normal rate and regular rhythm  Pulmonary:      Effort: Pulmonary effort is normal  No respiratory distress  Abdominal:      General: There is no distension  Tenderness: There is no abdominal tenderness  Musculoskeletal: Normal range of motion  General: No swelling or deformity  Comments: No calf tenderness bilaterally   Skin:     General: Skin is warm  Findings: No erythema  Neurological:      Mental Status: She is alert and oriented to person, place, and time  Mental status is at baseline                Diagnostic Results      Labs:    Results Reviewed     Procedure Component Value Units Date/Time    D-Dimer [[id number]]  (Abnormal) Collected: 02/22/21 2048    Lab Status: Final result Specimen: Blood from Arm, Left Updated: 02/22/21 2130     D-Dimer, Quant 1 01 ug/ml FEU     hCG, qualitative pregnancy [[id number]]  (Normal) Collected: 02/22/21 2048    Lab Status: Final result Specimen: Blood from Arm, Left Updated: 02/22/21 2117     Preg, Serum Negative    Troponin I [[id number]]  (Normal) Collected: 02/22/21 2048    Lab Status: Final result Specimen: Blood from Arm, Left Updated: 02/22/21 2112     Troponin I <0 02 ng/mL     Comprehensive metabolic panel [[id number]]  (Abnormal) Collected: 02/22/21 2048    Lab Status: Final result Specimen: Blood from Arm, Left Updated: 02/22/21 2110     Sodium 140 mmol/L      Potassium 4 2 mmol/L      Chloride 105 mmol/L      CO2 23 mmol/L      ANION GAP 12 mmol/L      BUN 22 mg/dL      Creatinine 1 06 mg/dL      Glucose 113 mg/dL      Calcium 8 9 mg/dL      Corrected Calcium 9 4 mg/dL      AST 18 U/L      ALT 35 U/L      Alkaline Phosphatase 54 U/L      Total Protein 8 4 g/dL      Albumin 3 4 g/dL      Total Bilirubin 0 20 mg/dL      eGFR 69 ml/min/1 73sq m     Narrative:      [place] guidelines for Chronic Kidney Disease (CKD):     Stage 1 with normal or high GFR (GFR > 90 mL/min/1 73 square meters)    Stage 2 Mild CKD (GFR = 60-89 mL/min/1 73 square meters)    Stage 3A Moderate CKD (GFR = 45-59 mL/min/1 73 square meters)    Stage 3B Moderate CKD (GFR = 30-44 mL/min/1 73 square meters)    Stage 4 Severe CKD (GFR = 15-29 mL/min/1 73 square meters)    Stage 5 End Stage CKD (GFR <15 mL/min/1 73 square meters)  Note: GFR calculation is accurate only with a steady state creatinine    CBC and differential [[id number]]  (Abnormal) Collected: 02/22/21 2048    Lab Status: Final result Specimen: Blood from Arm, Left Updated: 02/22/21 2057     WBC 8 60 Thousand/uL      RBC 4 76 Million/uL      Hemoglobin 13 1 g/dL      Hematocrit 41 0 %      MCV 86 fL      MCH 27 5 pg      MCHC 32 0 g/dL      RDW 16 4 %      MPV 10 7 fL      Platelets 613 Thousands/uL      nRBC 0 /100 WBCs      Neutrophils Relative 76 %      Immat GRANS % 0 %      Lymphocytes Relative 18 %      Monocytes Relative 6 %      Eosinophils Relative 0 %      Basophils Relative 0 %      Neutrophils Absolute 6 48 Thousands/µL      Immature Grans Absolute 0 03 Thousand/uL      Lymphocytes Absolute 1 56 Thousands/µL      Monocytes Absolute 0 50 Thousand/µL      Eosinophils Absolute 0 00 Thousand/µL      Basophils Absolute 0 03 Thousands/µL           All labs reviewed and utilized in the medical decision making process    Radiology:    CTA ED chest PE study   Final Result      No lung consolidation  No pulmonary embolism                    Workstation performed: [id number]         XR chest 1 view portable    (Results Pending)       All radiology studies independently viewed by me and interpreted by the radiologist     Procedure    Procedures        MDM    Patient presenting with shortness of breath as described, has COVID-19 diagnosis 11 days ago, concern for possible PE  Doubt ACS  Could have worsening COVID pneumonia  Obtain blood work, chest x-ray, EKG  EKG shows sinus rhythm heart rate of 85, narrow QRS, normal axis, short TX interval, QTC within normal limits, no ST elevation, no significant ST depression, no delta wave  ED Course as of Feb 22 2303 Mon Feb 22, 2021   2133 Will get CT chest for PE eval     D-Dimer, Quant(!): 1 01      Patient's ambulatory pulse ox is 97%  Patient updated with results, advised PCP f/u  ED return precautions discussed  Medications   iohexol (OMNIPAQUE) 350 MG/ML injection (SINGLE-DOSE) 100 mL (100 mL Intravenous Given 2/22/21 2210)           FINAL IMPRESSION    Final diagnoses:   SOB (shortness of breath)         DISPOSITION    Time reflects when diagnosis was documented in both MDM as applicable and the Disposition within this note     Time User Action Codes Description Comment    2/22/2021 10:43 PM Steve Bolanos Add [R06 02] SOB (shortness of breath)       ED Disposition     ED Disposition Condition Date/Time Comment    Discharge Stable Mon Feb 22, 2021 10:43 PM Marquita Owens discharge to home/self care  Follow-up Information     Follow up With Specialties Details Why Contact Info    Scott Mahmood, [id number], Nurse Practitioner Schedule an appointment as soon as possible for a visit in 2 days  Roger Jean   [address] [place] [id number] [phone]              PATIENT REFERRED TO:    Scott Mahmood, [id number] [facility]  [address] [place] [id number] [phone]    Schedule an appointment as soon as possible for a visit in 2 days        DISCHARGE MEDICATIONS:    Patient's Medications   Discharge Prescriptions    No medications on file       No discharge procedures on file           Demariora Jess, DO        This note was partially completed using voice recognition technology, and was scanned for gross errors; however some errors may still exist  Please contact the author with any questions or requests for clarification        Marilee Contreras,   02/22/21 1596

## 2021-05-28 ENCOUNTER — TELEMEDICINE (OUTPATIENT)
Dept: FAMILY MEDICINE CLINIC | Facility: CLINIC | Age: 36
End: 2021-05-28
Payer: COMMERCIAL

## 2021-05-28 DIAGNOSIS — L23.7 POISON IVY: Primary | ICD-10-CM

## 2021-05-28 PROCEDURE — 99213 OFFICE O/P EST LOW 20 MIN: CPT | Performed by: PHYSICIAN ASSISTANT

## 2021-05-28 RX ORDER — PREDNISONE 10 MG/1
TABLET ORAL
Qty: 30 TABLET | Refills: 0 | Status: SHIPPED | OUTPATIENT
Start: 2021-05-28 | End: 2021-08-20

## 2021-05-28 NOTE — PROGRESS NOTES
Virtual Brief Visit    Assessment/Plan:    Problem List Items Addressed This Visit     None      Visit Diagnoses     Poison ivy    -  Primary    Relevant Medications    predniSONE 10 mg tablet        Continue benadryl and Pepcid at night as directed       Reason for visit is   Chief Complaint   Patient presents with    Virtual Brief Visit        Encounter provider Erika Wilson PA-C    Provider located at [place] [address]  [address]  [address] [place] [phone]    Recent Visits  No visits were found meeting these conditions  Showing recent visits within past 7 days and meeting all other requirements     Today's Visits  Date Type Provider Dept   05/28/21 Telemedicine Erika Phillips PA-C [address] today's visits and meeting all other requirements     Future Appointments  No visits were found meeting these conditions  Showing future appointments within next 150 days and meeting all other requirements        After connecting through telephone, the patient was identified by name and date of birth  Luis Villafana was informed that this is a telemedicine visit and that the visit is being conducted through telephone  My office door was closed  No one else was in the room  She acknowledged consent and understanding of privacy and security of the platform  The patient has agreed to participate and understands she can discontinue the visit at any time  Patient is aware this is a billable service  Subjective    Luis Villafana is a 30 y o  female c/o poison ivy rash  31 y/o female presents for evaluation of rash via virtual visit  Patient states over the last few days she has been suffering from poison ivy vs poison sumac rash  She came in contact with outside  Taking benadryl without relief  Woke up this morning with worsening rash on bilateral arms  Denies SOB or wheezing         Past Medical History:   Diagnosis Date    Elevated cholesterol        Past Surgical History:   Procedure Laterality Date     SECTION      DILATION AND CURETTAGE OF UTERUS         Current Outpatient Medications   Medication Sig Dispense Refill    albuterol (Ventolin HFA) 90 mcg/act inhaler Inhale 2 puffs every 6 (six) hours as needed for wheezing or shortness of breath 18 g 0    cetirizine (ZyrTEC) 10 mg tablet Take 10 mg by mouth as needed       norgestimate-ethinyl estradiol (ORTHO-CYCLEN) 0 25-35 MG-MCG per tablet Take 1 tablet by mouth daily      predniSONE 10 mg tablet Take 4 tabs po daily x 3 days, take 3 tabs po daily x 3 days, take 2 tabs po daily x 3 days, take 1 tab po daily x 3 days 30 tablet 0    Pyridoxine HCl (VITAMIN B6 PO) Take by mouth daily       No current facility-administered medications for this visit  Allergies   Allergen Reactions    Penicillins      Family hx of severe reaction - never taken    Zithromax [Azithromycin] GI Intolerance    Clindamycin Rash       Review of Systems   Constitutional: Negative for chills, fatigue and fever  HENT: Negative for congestion, ear pain, sinus pain, sore throat and trouble swallowing  Eyes: Negative for pain, discharge and redness  Respiratory: Negative for cough, chest tightness, shortness of breath and wheezing  Cardiovascular: Negative for chest pain, palpitations and leg swelling  Gastrointestinal: Negative for abdominal pain, diarrhea, nausea and vomiting  Musculoskeletal: Negative for arthralgias, joint swelling and myalgias  Skin: Positive for rash  Neurological: Negative for dizziness, weakness, numbness and headaches  There were no vitals filed for this visit  I spent 5 minutes directly with the patient during this visit    It was my intent to perform this visit via video technology but the patient was not able to do a video connection so the visit was completed via audio telephone only    VIRTUAL VISIT [facility] acknowledges that she has consented to an online visit or consultation  She understands that the online visit is based solely on information provided by her, and that, in the absence of a face-to-face physical evaluation by the physician, the diagnosis she receives is both limited and provisional in terms of accuracy and completeness  This is not intended to replace a full medical face-to-face evaluation by the physician  Luna Gannon understands and accepts these terms

## 2021-07-09 ENCOUNTER — TELEPHONE (OUTPATIENT)
Dept: FAMILY MEDICINE CLINIC | Facility: CLINIC | Age: 36
End: 2021-07-09

## 2021-07-09 ENCOUNTER — OFFICE VISIT (OUTPATIENT)
Dept: FAMILY MEDICINE CLINIC | Facility: CLINIC | Age: 36
End: 2021-07-09
Payer: COMMERCIAL

## 2021-07-09 VITALS
SYSTOLIC BLOOD PRESSURE: 122 MMHG | HEIGHT: 62 IN | HEART RATE: 95 BPM | BODY MASS INDEX: 42.65 KG/M2 | DIASTOLIC BLOOD PRESSURE: 84 MMHG | WEIGHT: 231.8 LBS | OXYGEN SATURATION: 99 % | TEMPERATURE: 98.9 F

## 2021-07-09 DIAGNOSIS — Z23 ENCOUNTER FOR IMMUNIZATION: ICD-10-CM

## 2021-07-09 DIAGNOSIS — S91.312A LACERATION OF LEFT FOOT, INITIAL ENCOUNTER: Primary | ICD-10-CM

## 2021-07-09 PROCEDURE — 3008F BODY MASS INDEX DOCD: CPT | Performed by: PHYSICIAN ASSISTANT

## 2021-07-09 PROCEDURE — 99214 OFFICE O/P EST MOD 30 MIN: CPT | Performed by: PHYSICIAN ASSISTANT

## 2021-07-09 PROCEDURE — 1036F TOBACCO NON-USER: CPT | Performed by: PHYSICIAN ASSISTANT

## 2021-07-09 PROCEDURE — 90670 PCV13 VACCINE IM: CPT

## 2021-07-09 PROCEDURE — 90471 IMMUNIZATION ADMIN: CPT

## 2021-07-09 NOTE — TELEPHONE ENCOUNTER
Patient called in and stated that the provider was to [facility]  Patient got to [address] [place] [address] and nothing was there  I checked her medication list and see no order was sent over  Please advise if antibiotics were to be sent in

## 2021-07-09 NOTE — PROGRESS NOTES
[facility]  Acute Visit        NAME: Ady Yu is a 30 y o  female  : 1985    MRN: [id number]  DATE: 2021  TIME: 2:43 PM    Assessment and Plan   Laceration of left foot, initial encounter [S91 312A]  1  Laceration of left foot, initial encounter       Keep area clean and dry  Rinse with warm soapy water (dial antibacterial soap)  Recommend non stick padded dressing when walking  Doxycyline as directed    Patient Instructions     Notify the office or proceed to ER if symptoms worsen  Chief Complaint     Chief Complaint   Patient presents with    Wound Infection     left toe         History of Present Illness       31 y/o female presents for evaluation of laceration to left foot  Pt noticed a small laceration to the left bottom of her foot  Patient has been using otc abx cream  States the area continues to open when she walks on it  She is UTD tetanus  Denies fever  Denies injury  Review of Systems   Review of Systems   Constitutional: Negative for chills, fatigue and fever  HENT: Negative for congestion, ear pain, sinus pain, sore throat and trouble swallowing  Eyes: Negative for pain, discharge and redness  Respiratory: Negative for cough, chest tightness, shortness of breath and wheezing  Cardiovascular: Negative for chest pain, palpitations and leg swelling  Gastrointestinal: Negative for abdominal pain, diarrhea, nausea and vomiting  Musculoskeletal: Negative for arthralgias, joint swelling and myalgias  Skin: Positive for wound  Negative for rash  Neurological: Negative for dizziness, weakness, numbness and headaches           Current Medications       Current Outpatient Medications:     albuterol (Ventolin HFA) 90 mcg/act inhaler, Inhale 2 puffs every 6 (six) hours as needed for wheezing or shortness of breath, Disp: 18 g, Rfl: 0    cetirizine (ZyrTEC) 10 mg tablet, Take 10 mg by mouth as needed , Disp: , Rfl:    norgestimate-ethinyl estradiol (ORTHO-CYCLEN) 0 25-35 MG-MCG per tablet, Take 1 tablet by mouth daily, Disp: , Rfl:     Pyridoxine HCl (VITAMIN B6 PO), Take by mouth daily, Disp: , Rfl:     predniSONE 10 mg tablet, Take 4 tabs po daily x 3 days, take 3 tabs po daily x 3 days, take 2 tabs po daily x 3 days, take 1 tab po daily x 3 days (Patient not taking: Reported on 2021), Disp: 30 tablet, Rfl: 0    Current Allergies     Allergies as of 2021 - Reviewed 2021   Allergen Reaction Noted    Penicillins  2017    Zithromax [azithromycin] GI Intolerance 2017    Clindamycin Rash 2017            The following portions of the patient's history were reviewed and updated as appropriate: allergies, current medications, past family history, past medical history, past social history, past surgical history and problem list      Past Medical History:   Diagnosis Date    Elevated cholesterol        Past Surgical History:   Procedure Laterality Date     SECTION      DILATION AND CURETTAGE OF UTERUS         Family History   Problem Relation Age of Onset    Diabetes Mother          Medications have been verified  Objective   /84 (BP Location: Left arm, Patient Position: Sitting, Cuff Size: Large)   Pulse 95   Temp 98 9 °F (37 2 °C)   Ht 5' 2" (1 575 m)   Wt 105 kg (231 lb 12 8 oz)   LMP 2021   SpO2 99%   BMI 42 40 kg/m²        Physical Exam     Physical Exam  Vitals reviewed  Constitutional:       General: She is not in acute distress  Musculoskeletal:        Feet:    Neurological:      Mental Status: She is alert

## 2021-07-11 ENCOUNTER — NURSE TRIAGE (OUTPATIENT)
Dept: OTHER | Facility: OTHER | Age: 36
End: 2021-07-11

## 2021-07-11 NOTE — TELEPHONE ENCOUNTER
Reason for Disposition   [1] Redness or red streak around the injection site AND [2] begins > 48 hours after shot AND [3] no fever  (Exception: red area < 1 inch or 2 5 cm wide)    Answer Assessment - Initial Assessment Questions  1  SYMPTOMS: "What is the main symptom?" (e g , redness, swelling, pain)       Redness and swelling near the injection site    2  ONSET: "When was the vaccine (shot) given?" "How much later did the s/s begin?" (e g , hours, days ago)       Shot was given 7/9  The first day was itching  The second day was painful and a dime sized redness  Today is when the 3 by 3 ring around the injection site started    3  SEVERITY: "How bad is it?"       Moderate     4  FEVER: "Is there a fever?" If Yes, ask: "What is it, how was it measured, and when did it start?"       Denies     5  IMMUNIZATIONS GIVEN: "What shots have you recently received?"      Pneumonia     6  PAST REACTIONS: "Have you reacted to immunizations before?" If Yes, ask: "What happened?"      Flu shot given when pregnant in the past and patient developed an infection    7   OTHER SYMPTOMS: "Do you have any other symptoms?"      Slight itching, hardened, warmth, and painful to the touch    Protocols used: IMMUNIZATION REACTIONS-ADULT-AH

## 2021-07-11 NOTE — TELEPHONE ENCOUNTER
Regarding: 3 by 3 ring around shot area, feels bumpy  ----- Message from Collin Cameron sent at 7/11/2021  7:14 PM EDT -----  "There is a red area around the shot area and it feels bump  It is a big area   The first day I got it it was itchy, then sore, and today it is a 3 by 3 ring around it "

## 2021-08-20 ENCOUNTER — OFFICE VISIT (OUTPATIENT)
Dept: FAMILY MEDICINE CLINIC | Facility: CLINIC | Age: 36
End: 2021-08-20
Payer: COMMERCIAL

## 2021-08-20 ENCOUNTER — APPOINTMENT (OUTPATIENT)
Dept: RADIOLOGY | Facility: CLINIC | Age: 36
End: 2021-08-20
Payer: COMMERCIAL

## 2021-08-20 VITALS
HEIGHT: 62 IN | HEART RATE: 83 BPM | WEIGHT: 228 LBS | BODY MASS INDEX: 41.96 KG/M2 | DIASTOLIC BLOOD PRESSURE: 80 MMHG | OXYGEN SATURATION: 99 % | TEMPERATURE: 98.2 F | SYSTOLIC BLOOD PRESSURE: 124 MMHG

## 2021-08-20 DIAGNOSIS — M25.512 ACUTE PAIN OF LEFT SHOULDER: ICD-10-CM

## 2021-08-20 DIAGNOSIS — M25.512 ACUTE PAIN OF LEFT SHOULDER: Primary | ICD-10-CM

## 2021-08-20 PROBLEM — U07.1 RASH ASSOCIATED WITH COVID-19: Status: RESOLVED | Noted: 2021-02-19 | Resolved: 2021-08-20

## 2021-08-20 PROBLEM — Z03.818 ENCOUNTER FOR OBSERVATION FOR SUSPECTED EXPOSURE TO OTHER BIOLOGICAL AGENTS RULED OUT: Status: RESOLVED | Noted: 2021-02-11 | Resolved: 2021-08-20

## 2021-08-20 PROBLEM — Z86.16 HISTORY OF COVID-19: Status: ACTIVE | Noted: 2021-02-12

## 2021-08-20 PROBLEM — L23.7 CONTACT DERMATITIS DUE TO POISON SUMAC: Status: RESOLVED | Noted: 2019-08-06 | Resolved: 2021-08-20

## 2021-08-20 PROBLEM — R21 RASH ASSOCIATED WITH COVID-19: Status: RESOLVED | Noted: 2021-02-19 | Resolved: 2021-08-20

## 2021-08-20 PROCEDURE — 1036F TOBACCO NON-USER: CPT | Performed by: FAMILY MEDICINE

## 2021-08-20 PROCEDURE — 99214 OFFICE O/P EST MOD 30 MIN: CPT | Performed by: FAMILY MEDICINE

## 2021-08-20 PROCEDURE — 3008F BODY MASS INDEX DOCD: CPT | Performed by: FAMILY MEDICINE

## 2021-08-20 PROCEDURE — 73030 X-RAY EXAM OF SHOULDER: CPT

## 2021-08-20 NOTE — PROGRESS NOTES
oJhn Dorado 1985 female MRN: [id number]      ASSESSMENT/PLAN  Problem List Items Addressed This Visit     None      Visit Diagnoses     Acute pain of left shoulder    -  Primary    Relevant Orders    XR shoulder 2+ vw left    Ambulatory referral to Orthopedic Surgery        Will XR given trauma  Discussed options for management including PT vs Ortho -- pt would like to see Ortho  Referral placed  Continue supportive care  No future appointments  SUBJECTIVE  CC: Shoulder Pain (Left shoulder pain x 1 month  Patient stated that she fell yesterday and made it worse - patient stated that she had no trauma about 1 month ago )      HPI:  John Dorado is a 30 y o  female who presents due to shoulder pain  L shoulder pain x1 month  Has tried Tylenol, Motrin, heat/ice without relief   No known trauma prior to onset   Constant, hurts to lay on it, hurts to lift weight with it, decreased ROM by the end of day   Achy, but sharp with pressure   No numbness/tingling in extremity  Yesterday, fell down 5 steps and hit her L elbow, shoulder and back     Review of Systems   Musculoskeletal: Positive for arthralgias  Neurological: Negative for weakness and numbness         Historical Information   The patient history was reviewed and updated as follows:    Past Medical History:   Diagnosis Date    Elevated cholesterol     Gestational diabetes mellitus, antepartum 2015     Past Surgical History:   Procedure Laterality Date     SECTION      DILATION AND CURETTAGE OF UTERUS       Family History   Problem Relation Age of Onset    Diabetes Mother       Social History   Social History     Substance and Sexual Activity   Alcohol Use Never     Social History     Substance and Sexual Activity   Drug Use Never     Social History     Tobacco Use   Smoking Status Never Smoker   Smokeless Tobacco Never Used       Medications:     Current Outpatient Medications:     albuterol (Ventolin HFA) 90 mcg/act inhaler, Inhale 2 puffs every 6 (six) hours as needed for wheezing or shortness of breath, Disp: 18 g, Rfl: 0    cetirizine (ZyrTEC) 10 mg tablet, Take 10 mg by mouth as needed , Disp: , Rfl:     norgestimate-ethinyl estradiol (ORTHO-CYCLEN) 0 25-35 MG-MCG per tablet, Take 1 tablet by mouth daily, Disp: , Rfl:     Pyridoxine HCl (VITAMIN B6 PO), Take by mouth daily, Disp: , Rfl:   Allergies   Allergen Reactions    Penicillins      Family hx of severe reaction - never taken    Zithromax [Azithromycin] GI Intolerance    Clindamycin Rash       OBJECTIVE    Vitals:   Vitals:    08/20/21 1540   BP: 124/80   BP Location: Left arm   Patient Position: Sitting   Cuff Size: Standard   Pulse: 83   Temp: 98 2 °F (36 8 °C)   SpO2: 99%   Weight: 103 kg (228 lb)   Height: 5' 2" (1 575 m)           Physical Exam  Vitals and nursing note reviewed  Constitutional:       General: She is not in acute distress  Appearance: Normal appearance  HENT:      Head: Normocephalic and atraumatic  Pulmonary:      Effort: Pulmonary effort is normal  No respiratory distress  Musculoskeletal:      Comments: L shoulder:   No obvious deformity   Tender to palpation along spine of scapula and upper pec  ROM decreased in E, IR due to pain  (+) Neer, (-) Hawkin's, (+) Empty Can, (-) Drop Arm   Strength, sensation intact B/L    Neurological:      General: No focal deficit present  Mental Status: She is alert     Psychiatric:         Mood and Affect: Mood normal                     Blaine Olivares DO  [facility]   8/20/2021  3:51 PM

## 2021-09-08 ENCOUNTER — OFFICE VISIT (OUTPATIENT)
Dept: OBGYN CLINIC | Facility: CLINIC | Age: 36
End: 2021-09-08
Payer: COMMERCIAL

## 2021-09-08 VITALS
HEART RATE: 96 BPM | HEIGHT: 62 IN | DIASTOLIC BLOOD PRESSURE: 82 MMHG | BODY MASS INDEX: 41.77 KG/M2 | SYSTOLIC BLOOD PRESSURE: 119 MMHG | WEIGHT: 227 LBS

## 2021-09-08 DIAGNOSIS — M75.52 SUBACROMIAL BURSITIS OF LEFT SHOULDER JOINT: Primary | ICD-10-CM

## 2021-09-08 DIAGNOSIS — M62.838 TRAPEZIUS MUSCLE SPASM: ICD-10-CM

## 2021-09-08 DIAGNOSIS — M75.82 TENDINITIS OF LEFT ROTATOR CUFF: ICD-10-CM

## 2021-09-08 PROCEDURE — 1036F TOBACCO NON-USER: CPT | Performed by: FAMILY MEDICINE

## 2021-09-08 PROCEDURE — 99244 OFF/OP CNSLTJ NEW/EST MOD 40: CPT | Performed by: FAMILY MEDICINE

## 2021-09-08 PROCEDURE — 3008F BODY MASS INDEX DOCD: CPT | Performed by: FAMILY MEDICINE

## 2021-09-08 RX ORDER — PREDNISONE 20 MG/1
20 TABLET ORAL 2 TIMES DAILY WITH MEALS
Qty: 10 TABLET | Refills: 0 | Status: SHIPPED | OUTPATIENT
Start: 2021-09-08 | End: 2021-09-13

## 2021-09-08 RX ORDER — IBUPROFEN 800 MG/1
TABLET ORAL EVERY 6 HOURS PRN
COMMUNITY

## 2021-09-08 NOTE — PROGRESS NOTES
Assessment/Plan:  Assessment/Plan   Diagnoses and all orders for this visit:    Subacromial bursitis of left shoulder joint  -     Ambulatory referral to Orthopedic Surgery  -     predniSONE 20 mg tablet; Take 1 tablet (20 mg total) by mouth 2 (two) times a day with meals for 5 days  -     Ambulatory referral to Physical Therapy; Future    Trapezius muscle spasm  -     Ambulatory referral to Physical Therapy; Future    Tendinitis of left rotator cuff  -     Ambulatory referral to Physical Therapy; Future    Other orders  -     ibuprofen (MOTRIN) 800 mg tablet; Take by mouth every 6 (six) hours as needed for mild pain      71-year-old ambidextrous female with left shoulder pain and limited range of motion of nearly 2 months duration  Discussed with patient physical exam, radiographs, impression and plan  X-rays of left shoulder are unremarkable for osseous abnormality  Physical exam of cervical spine is unremarkable for midline or paraspinal tenderness  She has intact range of motion of cervical spine  Left shoulder tenderness the trapezius, anterior, lateral, and posterior aspects  She has range of motion limited to forward flexion actively 90° and passively to 110°, abduction 90°, and internal rotation to lumbar spine  She has 4+/5 external rotation  There is pain with empty can testing and Macario maneuver  She has no sensation, biceps reflex, and radial pulse in both upper extremities  Clinical impression is that she may be symptomatic from cervical bursitis/rotator cuff tendinitis causing impingement  I discussed regimen of anti-inflammatory, supplements, topical anesthetic, and physical therapy  She declines corticosteroid injection at this time  She is to take prednisone 20 mg twice daily with food for 5 days  She is to start taking tumeric 500 mg twice daily and tart cherry at least 1000 mg daily  She may apply topical diclofenac gel 3 to 4 times a day for the next 10 days    She is to start physical therapy as soon as possible and do home exercises as directed  She will follow up if no improvement  Subjective:   Patient ID: Nando Myers is a 30 y o  female  Chief Complaint   Patient presents with    Left Shoulder - Pain       62-year-old ambidextrous female presents for evaluation of left shoulder pain and limited range of motion of nearly 2 months duration  She denies any particular trauma or inciting event  She remembers waking up and having pain described as sudden in onset generalized to the shoulder worse at the anterior lateral aspect, radiating to the posterior aspect the shoulder, constant, achy and sore and sometimes sharp, worse with movement of the arm, associated limited range of motion, and improved resting  She reports worsening pain when laying on her left side  She had fall injury at home down 5 stairs about 2 weeks ago causing worsening of symptoms  She was seen by primary care provider and referred for x-rays which were unremarkable  She was advised to continue with NSAIDs and referred to orthopedic care  She has been taking ibuprofen mainly at nighttime to help with sleep  Shoulder Pain  This is a new problem  The current episode started more than 1 month ago  The problem occurs constantly  The problem has been gradually worsening  Associated symptoms include arthralgias and weakness  Pertinent negatives include no abdominal pain, chest pain, chills, fever, joint swelling, numbness, rash or sore throat  Exacerbated by: Arm use  She has tried rest, NSAIDs and position changes for the symptoms  The treatment provided mild relief  The following portions of the patient's history were reviewed and updated as appropriate: She  has a past medical history of Elevated cholesterol and Gestational diabetes mellitus, antepartum (2015)  She  has a past surgical history that includes  section and Dilation and curettage of uterus    Her family history includes Diabetes in her mother  She  reports that she has never smoked  She has never used smokeless tobacco  She reports that she does not drink alcohol and does not use drugs  She is allergic to penicillins, zithromax [azithromycin], and clindamycin       Review of Systems   Constitutional: Negative for chills and fever  HENT: Negative for sore throat  Eyes: Negative for visual disturbance  Respiratory: Negative for shortness of breath  Cardiovascular: Negative for chest pain  Gastrointestinal: Negative for abdominal pain  Genitourinary: Negative for flank pain  Musculoskeletal: Positive for arthralgias  Negative for joint swelling  Skin: Negative for rash and wound  Neurological: Positive for weakness  Negative for numbness  Hematological: Does not bruise/bleed easily  Psychiatric/Behavioral: Negative for self-injury  Objective:  Vitals:    09/08/21 1503   BP: 119/82   Pulse: 96   Weight: 103 kg (227 lb)   Height: 5' 2" (1 575 m)     Back Exam     Reflexes   Biceps: normal    Comments:  Cervical spine  -no tenderness   -normal range of motion  -normal sensation and biceps reflex both upper extremities      Right Hand Exam     Muscle Strength   The patient has normal right wrist strength  Other   Sensation: normal  Pulse: present      Left Hand Exam     Muscle Strength   The patient has normal left wrist strength  Other   Sensation: normal  Pulse: present      Right Elbow Exam     Other   Sensation: normal      Left Elbow Exam     Tenderness   The patient is experiencing no tenderness  Range of Motion   The patient has normal left elbow ROM  Muscle Strength   The patient has normal left elbow strength (5/5 flexion and extension)  Other   Sensation: normal      Right Shoulder Exam     Other   Sensation: normal      Left Shoulder Exam     Tenderness   Left shoulder tenderness location: Trapezius, anterior, lateral, posterior      Range of Motion   Active abduction: 90 Forward flexion: 90 (Passive 110°)   Internal rotation 0 degrees: Lumbar     Muscle Strength   Left shoulder normal muscle strength: 4+/5 external rotation  Abduction: 5/5   Internal rotation: 5/5   Supraspinatus: 5/5     Tests   Macario test: positive    Other   Sensation: normal     Comments:  Negative belly press   Pain with empty can test          Strength/Myotome Testing     Left Wrist/Hand   Normal wrist strength    Right Wrist/Hand   Normal wrist strength      Physical Exam  Vitals and nursing note reviewed  Constitutional:       General: She is not in acute distress  Appearance: She is well-developed  She is not ill-appearing or diaphoretic  HENT:      Head: Normocephalic  Right Ear: External ear normal       Left Ear: External ear normal    Eyes:      Conjunctiva/sclera: Conjunctivae normal    Neck:      Trachea: No tracheal deviation  Cardiovascular:      Rate and Rhythm: Normal rate  Pulmonary:      Effort: Pulmonary effort is normal  No respiratory distress  Abdominal:      General: There is no distension  Musculoskeletal:         General: Tenderness present  No swelling, deformity or signs of injury  Skin:     General: Skin is warm and dry  Coloration: Skin is not jaundiced or pale  Neurological:      Mental Status: She is alert and oriented to person, place, and time  Psychiatric:         Mood and Affect: Mood normal          Behavior: Behavior normal          Thought Content: Thought content normal          Judgment: Judgment normal          I have personally reviewed pertinent films in PACS and my interpretation is No osseous abnormality of left shoulder

## 2021-09-08 NOTE — LETTER
September 9, 2021     Daisha Ards, DO  [address]  [address]  [place] [address]    Patient: Jane Silveira   YOB: 1985   Date of Visit: 9/8/2021       Dear Dr Savanna Hernandez: Thank you for referring Joana James to me for evaluation  Below are my notes for this consultation  If you have questions, please do not hesitate to call me  I look forward to following your patient along with you  Sincerely,        [employer], DO        CC: No Recipients  [employer], DO  9/8/2021  4:38 PM  Signed  Assessment/Plan:  Assessment/Plan   Diagnoses and all orders for this visit:    Subacromial bursitis of left shoulder joint  -     Ambulatory referral to Orthopedic Surgery  -     predniSONE 20 mg tablet; Take 1 tablet (20 mg total) by mouth 2 (two) times a day with meals for 5 days  -     Ambulatory referral to Physical Therapy; Future    Trapezius muscle spasm  -     Ambulatory referral to Physical Therapy; Future    Tendinitis of left rotator cuff  -     Ambulatory referral to Physical Therapy; Future    Other orders  -     ibuprofen (MOTRIN) 800 mg tablet; Take by mouth every 6 (six) hours as needed for mild pain      29-year-old ambidextrous female with left shoulder pain and limited range of motion of nearly 2 months duration  Discussed with patient physical exam, radiographs, impression and plan  X-rays of left shoulder are unremarkable for osseous abnormality  Physical exam of cervical spine is unremarkable for midline or paraspinal tenderness  She has intact range of motion of cervical spine  Left shoulder tenderness the trapezius, anterior, lateral, and posterior aspects  She has range of motion limited to forward flexion actively 90° and passively to 110°, abduction 90°, and internal rotation to lumbar spine  She has 4+/5 external rotation  There is pain with empty can testing and Macario maneuver    She has no sensation, biceps reflex, and radial pulse in both upper extremities  Clinical impression is that she may be symptomatic from cervical bursitis/rotator cuff tendinitis causing impingement  I discussed regimen of anti-inflammatory, supplements, topical anesthetic, and physical therapy  She declines corticosteroid injection at this time  She is to take prednisone 20 mg twice daily with food for 5 days  She is to start taking tumeric 500 mg twice daily and tart cherry at least 1000 mg daily  She may apply topical diclofenac gel 3 to 4 times a day for the next 10 days  She is to start physical therapy as soon as possible and do home exercises as directed  She will follow up if no improvement  Subjective:   Patient ID: Matthias Chaudhryain is a 30 y o  female  Chief Complaint   Patient presents with    Left Shoulder - Pain       80-year-old ambidextrous female presents for evaluation of left shoulder pain and limited range of motion of nearly 2 months duration  She denies any particular trauma or inciting event  She remembers waking up and having pain described as sudden in onset generalized to the shoulder worse at the anterior lateral aspect, radiating to the posterior aspect the shoulder, constant, achy and sore and sometimes sharp, worse with movement of the arm, associated limited range of motion, and improved resting  She reports worsening pain when laying on her left side  She had fall injury at home down 5 stairs about 2 weeks ago causing worsening of symptoms  She was seen by primary care provider and referred for x-rays which were unremarkable  She was advised to continue with NSAIDs and referred to orthopedic care  She has been taking ibuprofen mainly at nighttime to help with sleep  Shoulder Pain  This is a new problem  The current episode started more than 1 month ago  The problem occurs constantly  The problem has been gradually worsening  Associated symptoms include arthralgias and weakness   Pertinent negatives include no abdominal pain, chest pain, chills, fever, joint swelling, numbness, rash or sore throat  Exacerbated by: Arm use  She has tried rest, NSAIDs and position changes for the symptoms  The treatment provided mild relief  The following portions of the patient's history were reviewed and updated as appropriate: She  has a past medical history of Elevated cholesterol and Gestational diabetes mellitus, antepartum (2015)  She  has a past surgical history that includes  section and Dilation and curettage of uterus  Her family history includes Diabetes in her mother  She  reports that she has never smoked  She has never used smokeless tobacco  She reports that she does not drink alcohol and does not use drugs  She is allergic to penicillins, zithromax [azithromycin], and clindamycin       Review of Systems   Constitutional: Negative for chills and fever  HENT: Negative for sore throat  Eyes: Negative for visual disturbance  Respiratory: Negative for shortness of breath  Cardiovascular: Negative for chest pain  Gastrointestinal: Negative for abdominal pain  Genitourinary: Negative for flank pain  Musculoskeletal: Positive for arthralgias  Negative for joint swelling  Skin: Negative for rash and wound  Neurological: Positive for weakness  Negative for numbness  Hematological: Does not bruise/bleed easily  Psychiatric/Behavioral: Negative for self-injury  Objective:  Vitals:    21 1503   BP: 119/82   Pulse: 96   Weight: 103 kg (227 lb)   Height: 5' 2" (1 575 m)     Back Exam     Reflexes   Biceps: normal    Comments:  Cervical spine  -no tenderness   -normal range of motion  -normal sensation and biceps reflex both upper extremities      Right Hand Exam     Muscle Strength   The patient has normal right wrist strength  Other   Sensation: normal  Pulse: present      Left Hand Exam     Muscle Strength   The patient has normal left wrist strength      Other   Sensation: normal  Pulse: present      Right Elbow Exam     Other   Sensation: normal      Left Elbow Exam     Tenderness   The patient is experiencing no tenderness  Range of Motion   The patient has normal left elbow ROM  Muscle Strength   The patient has normal left elbow strength (5/5 flexion and extension)  Other   Sensation: normal      Right Shoulder Exam     Other   Sensation: normal      Left Shoulder Exam     Tenderness   Left shoulder tenderness location: Trapezius, anterior, lateral, posterior  Range of Motion   Active abduction: 90   Forward flexion: 90 (Passive 110°)   Internal rotation 0 degrees: Lumbar     Muscle Strength   Left shoulder normal muscle strength: 4+/5 external rotation  Abduction: 5/5   Internal rotation: 5/5   Supraspinatus: 5/5     Tests   Macario test: positive    Other   Sensation: normal     Comments:  Negative belly press   Pain with empty can test          Strength/Myotome Testing     Left Wrist/Hand   Normal wrist strength    Right Wrist/Hand   Normal wrist strength      Physical Exam  Vitals and nursing note reviewed  Constitutional:       General: She is not in acute distress  Appearance: She is well-developed  She is not ill-appearing or diaphoretic  HENT:      Head: Normocephalic  Right Ear: External ear normal       Left Ear: External ear normal    Eyes:      Conjunctiva/sclera: Conjunctivae normal    Neck:      Trachea: No tracheal deviation  Cardiovascular:      Rate and Rhythm: Normal rate  Pulmonary:      Effort: Pulmonary effort is normal  No respiratory distress  Abdominal:      General: There is no distension  Musculoskeletal:         General: Tenderness present  No swelling, deformity or signs of injury  Skin:     General: Skin is warm and dry  Coloration: Skin is not jaundiced or pale  Neurological:      Mental Status: She is alert and oriented to person, place, and time     Psychiatric:         Mood and Affect: Mood normal          Behavior: Behavior normal          Thought Content: Thought content normal          Judgment: Judgment normal          I have personally reviewed pertinent films in PACS and my interpretation is No osseous abnormality of left shoulder

## 2021-09-08 NOTE — LETTER
September 8, 2021     Ye Rome DO  [address] [place] [address]    Patient: Ez Triana   YOB: 1985   Date of Visit: 9/8/2021       Dear Dr Yun Garza: Thank you for referring Chiqui Soriano to me for evaluation  Below are my notes for this consultation  If you have questions, please do not hesitate to call me  I look forward to following your patient along with you  Sincerely,        [employer], DO        CC: No Recipients  [employer]  9/8/2021  4:37 PM  Sign when Signing Visit  Assessment/Plan:  Assessment/Plan   Diagnoses and all orders for this visit:    Subacromial bursitis of left shoulder joint  -     Ambulatory referral to Orthopedic Surgery  -     predniSONE 20 mg tablet; Take 1 tablet (20 mg total) by mouth 2 (two) times a day with meals for 5 days  -     Ambulatory referral to Physical Therapy; Future    Trapezius muscle spasm  -     Ambulatory referral to Physical Therapy; Future    Tendinitis of left rotator cuff  -     Ambulatory referral to Physical Therapy; Future    Other orders  -     ibuprofen (MOTRIN) 800 mg tablet; Take by mouth every 6 (six) hours as needed for mild pain      69-year-old ambidextrous female with left shoulder pain and limited range of motion of nearly 2 months duration  Discussed with patient physical exam, radiographs, impression and plan  X-rays of left shoulder are unremarkable for osseous abnormality  Physical exam of cervical spine is unremarkable for midline or paraspinal tenderness  She has intact range of motion of cervical spine  Left shoulder tenderness the trapezius, anterior, lateral, and posterior aspects  She has range of motion limited to forward flexion actively 90° and passively to 110°, abduction 90°, and internal rotation to lumbar spine  She has 4+/5 external rotation  There is pain with empty can testing and Macario maneuver    She has no sensation, biceps reflex, and radial pulse in both upper extremities  Clinical impression is that she may be symptomatic from cervical bursitis/rotator cuff tendinitis causing impingement  I discussed regimen of anti-inflammatory, supplements, topical anesthetic, and physical therapy  She declines corticosteroid injection at this time  She is to take prednisone 20 mg twice daily with food for 5 days  She is to start taking tumeric 500 mg twice daily and tart cherry at least 1000 mg daily  She may apply topical diclofenac gel 3 to 4 times a day for the next 10 days  She is to start physical therapy as soon as possible and do home exercises as directed  She will follow up if no improvement  Subjective:   Patient ID: Jr Robledo is a 30 y o  female  Chief Complaint   Patient presents with    Left Shoulder - Pain       32-year-old ambidextrous female presents for evaluation of left shoulder pain and limited range of motion of nearly 2 months duration  She denies any particular trauma or inciting event  She remembers waking up and having pain described as sudden in onset generalized to the shoulder worse at the anterior lateral aspect, radiating to the posterior aspect the shoulder, constant, achy and sore and sometimes sharp, worse with movement of the arm, associated limited range of motion, and improved resting  She reports worsening pain when laying on her left side  She had fall injury at home down 5 stairs about 2 weeks ago causing worsening of symptoms  She was seen by primary care provider and referred for x-rays which were unremarkable  She was advised to continue with NSAIDs and referred to orthopedic care  She has been taking ibuprofen mainly at nighttime to help with sleep  Shoulder Pain  This is a new problem  The current episode started more than 1 month ago  The problem occurs constantly  The problem has been gradually worsening  Associated symptoms include arthralgias and weakness   Pertinent negatives include no abdominal pain, chest pain, chills, fever, joint swelling, numbness, rash or sore throat  Exacerbated by: Arm use  She has tried rest, NSAIDs and position changes for the symptoms  The treatment provided mild relief  The following portions of the patient's history were reviewed and updated as appropriate: She  has a past medical history of Elevated cholesterol and Gestational diabetes mellitus, antepartum (2015)  She  has a past surgical history that includes  section and Dilation and curettage of uterus  Her family history includes Diabetes in her mother  She  reports that she has never smoked  She has never used smokeless tobacco  She reports that she does not drink alcohol and does not use drugs  She is allergic to penicillins, zithromax [azithromycin], and clindamycin       Review of Systems   Constitutional: Negative for chills and fever  HENT: Negative for sore throat  Eyes: Negative for visual disturbance  Respiratory: Negative for shortness of breath  Cardiovascular: Negative for chest pain  Gastrointestinal: Negative for abdominal pain  Genitourinary: Negative for flank pain  Musculoskeletal: Positive for arthralgias  Negative for joint swelling  Skin: Negative for rash and wound  Neurological: Positive for weakness  Negative for numbness  Hematological: Does not bruise/bleed easily  Psychiatric/Behavioral: Negative for self-injury  Objective:  Vitals:    21 1503   BP: 119/82   Pulse: 96   Weight: 103 kg (227 lb)   Height: 5' 2" (1 575 m)     Back Exam     Reflexes   Biceps: normal    Comments:  Cervical spine  -no tenderness   -normal range of motion  -normal sensation and biceps reflex both upper extremities      Right Hand Exam     Muscle Strength   The patient has normal right wrist strength  Other   Sensation: normal  Pulse: present      Left Hand Exam     Muscle Strength   The patient has normal left wrist strength      Other   Sensation: normal  Pulse: present      Right Elbow Exam     Other   Sensation: normal      Left Elbow Exam     Tenderness   The patient is experiencing no tenderness  Range of Motion   The patient has normal left elbow ROM  Muscle Strength   The patient has normal left elbow strength (5/5 flexion and extension)  Other   Sensation: normal      Right Shoulder Exam     Other   Sensation: normal      Left Shoulder Exam     Tenderness   Left shoulder tenderness location: Trapezius, anterior, lateral, posterior  Range of Motion   Active abduction: 90   Forward flexion: 90 (Passive 110°)   Internal rotation 0 degrees: Lumbar     Muscle Strength   Left shoulder normal muscle strength: 4+/5 external rotation  Abduction: 5/5   Internal rotation: 5/5   Supraspinatus: 5/5     Tests   Macario test: positive    Other   Sensation: normal     Comments:  Negative belly press   Pain with empty can test          Strength/Myotome Testing     Left Wrist/Hand   Normal wrist strength    Right Wrist/Hand   Normal wrist strength      Physical Exam  Vitals and nursing note reviewed  Constitutional:       General: She is not in acute distress  Appearance: She is well-developed  She is not ill-appearing or diaphoretic  HENT:      Head: Normocephalic  Right Ear: External ear normal       Left Ear: External ear normal    Eyes:      Conjunctiva/sclera: Conjunctivae normal    Neck:      Trachea: No tracheal deviation  Cardiovascular:      Rate and Rhythm: Normal rate  Pulmonary:      Effort: Pulmonary effort is normal  No respiratory distress  Abdominal:      General: There is no distension  Musculoskeletal:         General: Tenderness present  No swelling, deformity or signs of injury  Skin:     General: Skin is warm and dry  Coloration: Skin is not jaundiced or pale  Neurological:      Mental Status: She is alert and oriented to person, place, and time     Psychiatric:         Mood and Affect: Mood normal  Behavior: Behavior normal          Thought Content: Thought content normal          Judgment: Judgment normal          I have personally reviewed pertinent films in PACS and my interpretation is No osseous abnormality of left shoulder

## 2022-01-15 ENCOUNTER — OFFICE VISIT (OUTPATIENT)
Dept: URGENT CARE | Facility: CLINIC | Age: 37
End: 2022-01-15
Payer: COMMERCIAL

## 2022-01-15 VITALS
TEMPERATURE: 97.5 F | RESPIRATION RATE: 18 BRPM | WEIGHT: 226 LBS | BODY MASS INDEX: 41.59 KG/M2 | OXYGEN SATURATION: 99 % | HEART RATE: 87 BPM | HEIGHT: 62 IN

## 2022-01-15 DIAGNOSIS — Z11.59 SPECIAL SCREENING EXAMINATION FOR VIRAL DISEASE: Primary | ICD-10-CM

## 2022-01-15 DIAGNOSIS — R05.9 COUGH: ICD-10-CM

## 2022-01-15 PROCEDURE — U0003 INFECTIOUS AGENT DETECTION BY NUCLEIC ACID (DNA OR RNA); SEVERE ACUTE RESPIRATORY SYNDROME CORONAVIRUS 2 (SARS-COV-2) (CORONAVIRUS DISEASE [COVID-19]), AMPLIFIED PROBE TECHNIQUE, MAKING USE OF HIGH THROUGHPUT TECHNOLOGIES AS DESCRIBED BY CMS-2020-01-R: HCPCS | Performed by: NURSE PRACTITIONER

## 2022-01-15 PROCEDURE — 99213 OFFICE O/P EST LOW 20 MIN: CPT | Performed by: NURSE PRACTITIONER

## 2022-01-15 PROCEDURE — S9088 SERVICES PROVIDED IN URGENT: HCPCS | Performed by: NURSE PRACTITIONER

## 2022-01-15 PROCEDURE — U0005 INFEC AGEN DETEC AMPLI PROBE: HCPCS | Performed by: NURSE PRACTITIONER

## 2022-01-15 RX ORDER — ALBUTEROL SULFATE 90 UG/1
2 AEROSOL, METERED RESPIRATORY (INHALATION) EVERY 6 HOURS PRN
Qty: 18 G | Refills: 0 | Status: SHIPPED | OUTPATIENT
Start: 2022-01-15

## 2022-01-15 NOTE — LETTER
Kiko Her [address] [address]  Dept: [phone]    January 15, 2022    Patient: Tamara Regalado  YOB: 1985    Tamara Regalado was seen and evaluated at our [facility]  Please note if Covid and Flu tests are negative, they may return to work when fever free for 24 hours without the use of a fever reducing agent  If Covid or Flu test is positive, they may return to work on 1/20/2022 as this is 5 days from the onset of symptoms  Upon return, they must then adhere to strict masking for an additional 5 days      Sincerely,    HERNÁN George

## 2022-01-15 NOTE — PROGRESS NOTES
[facility]        NAME: Henrietta Alonzo is a 29 y o  female  : 1985    MRN: [id number]  DATE: January 15, 2022  TIME: 11:12 AM    Assessment and Plan   Special screening examination for viral disease [Z11 59]  1  Special screening examination for viral disease  COVID Only -Office Collect         Patient Instructions     Patient Instructions   Patient instructed to self quarantine  Advised to avoid nsaids  If you develop prolonged high fever, worsening or productive cough, shortness of breath, difficulty breathing, chest pain, or any new or concerning symptoms please proceed ER  Instructed patient to call ER prior to arrival make them aware she is being test for covid  Patient verbalizes understanding  Start vitamin c 1g twice daily,vitamin d3 2000IU daily, and multivitamin  monitor pulse ox  Call PCP in 24 hours for f/u  [address]    Your healthcare provider and/or public health staff have evaluated you and have determined that you do not need to remain in the hospital at this time  At this time you can be isolated at home where you will be monitored by staff from your local or state health department  You should carefully follow the prevention and isolation steps below until a healthcare provider or local or state health department says that you can return to your normal activities  Stay home except to get medical care    People who are mildly ill with COVID-19 are able to isolate at home during their illness  You should restrict activities outside your home, except for getting medical care  Do not go to work, school, or public areas  Avoid using public transportation, ride-sharing, or taxis  Separate yourself from other people and animals in your home    People: As much as possible, you should stay in a specific room and away from other people in your home  Also, you should use a separate bathroom, if available  Animals:  You should restrict contact with pets and other animals while you are sick with COVID-19, just like you would around other people  Although there have not been reports of pets or other animals becoming sick with COVID-19, it is still recommended that people sick with COVID-19 limit contact with animals until more information is known about the virus  When possible, have another member of your household care for your animals while you are sick  If you are sick with COVID-19, avoid contact with your pet, including petting, snuggling, being kissed or licked, and sharing food  If you must care for your pet or be around animals while you are sick, wash your hands before and after you interact with pets and wear a facemask  See COVID-19 and Animals for more information  Call ahead before visiting your doctor    If you have a medical appointment, call the healthcare provider and tell them that you have or may have COVID-19  This will help the healthcare providers office take steps to keep other people from getting infected or exposed  Wear a facemask    You should wear a facemask when you are around other people (e g , sharing a room or vehicle) or pets and before you enter a healthcare providers office  If you are not able to wear a facemask (for example, because it causes trouble breathing), then people who live with you should not stay in the same room with you, or they should wear a facemask if they enter your room  Cover your coughs and sneezes    Cover your mouth and nose with a tissue when you cough or sneeze  Throw used tissues in a lined trash can  Immediately wash your hands with soap and water for at least 20 seconds or, if soap and water are not available, clean your hands with an alcohol-based hand  that contains at least 60% alcohol      Clean your hands often    Wash your hands often with soap and water for at least 20 seconds, especially after blowing your nose, coughing, or sneezing; going to the bathroom; and before eating or preparing food  If soap and water are not readily available, use an alcohol-based hand  with at least 60% alcohol, covering all surfaces of your hands and rubbing them together until they feel dry  Soap and water are the best option if hands are visibly dirty  Avoid touching your eyes, nose, and mouth with unwashed hands  Avoid sharing personal household items    You should not share dishes, drinking glasses, cups, eating utensils, towels, or bedding with other people or pets in your home  After using these items, they should be washed thoroughly with soap and water  Clean all high-touch surfaces everyday    High touch surfaces include counters, tabletops, doorknobs, bathroom fixtures, toilets, phones, keyboards, tablets, and bedside tables  Also, clean any surfaces that may have blood, stool, or body fluids on them  Use a household cleaning spray or wipe, according to the label instructions  Labels contain instructions for safe and effective use of the cleaning product including precautions you should take when applying the product, such as wearing gloves and making sure you have good ventilation during use of the product  Monitor your symptoms    Seek prompt medical attention if your illness is worsening (e g , difficulty breathing)  Before seeking care, call your healthcare provider and tell them that you have, or are being evaluated for, COVID-19  Put on a facemask before you enter the facility  These steps will help the healthcare providers office to keep other people in the office or waiting room from getting infected or exposed  Ask your healthcare provider to call the local or state health department  Persons who are placed under active monitoring or facilitated self-monitoring should follow instructions provided by their local health department or occupational health professionals, as appropriate    If you have a medical emergency and need to call 911, notify the dispatch personnel that you have, or are being evaluated for COVID-19  If possible, put on a facemask before emergency medical services arrive  Discontinuing home isolation    Patients with confirmed COVID-19 should remain under home isolation precautions until the following conditions are met:   - They have had no fever for at least 24 hours (that is one full day of no fever without the use medicine that reduces fevers)  AND  - other symptoms have improved (for example, when their cough or shortness of breath have improved)  AND  - If had mild or moderate illness, at least 10 days have passed since their symptoms first appeared or if severe illness (needed oxygen) or immunosuppressed, at least 20 days have passed since symptoms first appeared  Patients with confirmed COVID-19 should also notify close contacts (including their workplace) and ask that they self-quarantine  Currently, close contact is defined as being within 6 feet for 15 minutes or more from the period 24 hours starting 48 hours before symptom onset to the time at which the patient went into isolation  Close contacts of patients diagnosed with COVID-19 should be instructed by the patient to self-quarantine for 14 days from the last time of their last contact with the patient  Source: [employer]         Follow up with PCP in 3-5 days  Proceed to  ER if symptoms worsen  Chief Complaint     Chief Complaint   Patient presents with    Cold Like Symptoms     chest congestion, stuffy nose, fever, chills for 1 day         History of Present Illness       Fever  This is a new problem  The current episode started yesterday  The problem occurs constantly  The problem has been unchanged  Associated symptoms include chills, congestion, coughing, fatigue and a fever   Pertinent negatives include no abdominal pain, arthralgias, chest pain, diaphoresis, headaches, joint swelling, myalgias, nausea, neck pain, numbness, sore throat or weakness  Associated symptoms comments: tmax 100 5    Nothing aggravates the symptoms  She has tried nothing for the symptoms  Patient had a negative rapid covid test yesterday, wants a pcr  Denies any recent sick contacts or known exposure to covid 19  Patient unvaccinated against covid or flu  Patient ran out of her albuterol inhaler, requesting refill  Review of Systems   Review of Systems   Constitutional: Positive for chills, fatigue and fever  Negative for diaphoresis  HENT: Positive for congestion  Negative for ear pain, facial swelling, postnasal drip, rhinorrhea, sinus pressure, sinus pain, sore throat, tinnitus and trouble swallowing  Eyes: Negative  Respiratory: Positive for cough  Negative for chest tightness, shortness of breath, wheezing and stridor  Cardiovascular: Negative for chest pain and palpitations  Gastrointestinal: Negative  Negative for abdominal pain and nausea  Musculoskeletal: Negative for arthralgias, back pain, joint swelling, myalgias, neck pain and neck stiffness  Neurological: Negative for dizziness, facial asymmetry, weakness, light-headedness, numbness and headaches           Current Medications       Current Outpatient Medications:     norethindrone-ethinyl estradiol (OVCON) 0 4-35 MG-MCG per tablet, Take 1 tablet by mouth daily, Disp: , Rfl:     albuterol (Ventolin HFA) 90 mcg/act inhaler, Inhale 2 puffs every 6 (six) hours as needed for wheezing or shortness of breath (Patient not taking: Reported on 1/15/2022 ), Disp: 18 g, Rfl: 0    cetirizine (ZyrTEC) 10 mg tablet, Take 10 mg by mouth as needed  (Patient not taking: Reported on 1/15/2022 ), Disp: , Rfl:     ibuprofen (MOTRIN) 800 mg tablet, Take by mouth every 6 (six) hours as needed for mild pain (Patient not taking: Reported on 1/15/2022 ), Disp: , Rfl:     norgestimate-ethinyl estradiol (ORTHO-CYCLEN) 0 25-35 MG-MCG per tablet, Take 1 tablet by mouth daily, Disp: , Rfl:     Pyridoxine HCl (VITAMIN B6 PO), Take by mouth daily (Patient not taking: Reported on 2021), Disp: , Rfl:     Current Allergies     Allergies as of 01/15/2022 - Reviewed 01/15/2022   Allergen Reaction Noted    Penicillins  2017    Zithromax [azithromycin] GI Intolerance 2017    Clindamycin Rash 2017            The following portions of the patient's history were reviewed and updated as appropriate: allergies, current medications, past family history, past medical history, past social history, past surgical history and problem list      Past Medical History:   Diagnosis Date    Elevated cholesterol     Gestational diabetes mellitus, antepartum 2015       Past Surgical History:   Procedure Laterality Date     SECTION      DILATION AND CURETTAGE OF UTERUS         Family History   Problem Relation Age of Onset    Diabetes Mother          Medications have been verified  Objective   Pulse 87   Temp 97 5 °F (36 4 °C) (Tympanic)   Resp 18   Ht 5' 2" (1 575 m)   Wt 103 kg (226 lb)   SpO2 99%   BMI 41 34 kg/m²   No LMP recorded  Physical Exam     Physical Exam  Constitutional:       General: She is not in acute distress  Appearance: She is well-developed  She is not diaphoretic  HENT:      Head: Normocephalic and atraumatic  Right Ear: Hearing, tympanic membrane, ear canal and external ear normal       Left Ear: Hearing, tympanic membrane, ear canal and external ear normal       Nose: Congestion present  No mucosal edema or rhinorrhea  Right Sinus: No maxillary sinus tenderness or frontal sinus tenderness  Left Sinus: No maxillary sinus tenderness or frontal sinus tenderness  Mouth/Throat:      Pharynx: Oropharynx is clear  Uvula midline  Cardiovascular:      Rate and Rhythm: Normal rate and regular rhythm        Heart sounds: Normal heart sounds, S1 normal and S2 normal    Pulmonary:      Effort: Pulmonary effort is normal       Breath sounds: Normal breath sounds and air entry  Lymphadenopathy:      Cervical: No cervical adenopathy  Skin:     General: Skin is warm and dry  Capillary Refill: Capillary refill takes less than 2 seconds  Neurological:      Mental Status: She is alert and oriented to person, place, and time

## 2022-01-15 NOTE — PATIENT INSTRUCTIONS
Patient instructed to self quarantine  Advised to avoid nsaids  If you develop prolonged high fever, worsening or productive cough, shortness of breath, difficulty breathing, chest pain, or any new or concerning symptoms please proceed ER  Instructed patient to call ER prior to arrival make them aware she is being test for covid  Patient verbalizes understanding  Start vitamin c 1g twice daily,vitamin d3 2000IU daily, and multivitamin  monitor pulse ox  Call PCP in 24 hours for f/u  [address]    Your healthcare provider and/or public health staff have evaluated you and have determined that you do not need to remain in the hospital at this time  At this time you can be isolated at home where you will be monitored by staff from your local or state health department  You should carefully follow the prevention and isolation steps below until a healthcare provider or local or state health department says that you can return to your normal activities  Stay home except to get medical care    People who are mildly ill with COVID-19 are able to isolate at home during their illness  You should restrict activities outside your home, except for getting medical care  Do not go to work, school, or public areas  Avoid using public transportation, ride-sharing, or taxis  Separate yourself from other people and animals in your home    People: As much as possible, you should stay in a specific room and away from other people in your home  Also, you should use a separate bathroom, if available  Animals: You should restrict contact with pets and other animals while you are sick with COVID-19, just like you would around other people  Although there have not been reports of pets or other animals becoming sick with COVID-19, it is still recommended that people sick with COVID-19 limit contact with animals until more information is known about the virus   When possible, have another member of your household care for your animals while you are sick  If you are sick with COVID-19, avoid contact with your pet, including petting, snuggling, being kissed or licked, and sharing food  If you must care for your pet or be around animals while you are sick, wash your hands before and after you interact with pets and wear a facemask  See COVID-19 and Animals for more information  Call ahead before visiting your doctor    If you have a medical appointment, call the healthcare provider and tell them that you have or may have COVID-19  This will help the healthcare providers office take steps to keep other people from getting infected or exposed  Wear a facemask    You should wear a facemask when you are around other people (e g , sharing a room or vehicle) or pets and before you enter a healthcare providers office  If you are not able to wear a facemask (for example, because it causes trouble breathing), then people who live with you should not stay in the same room with you, or they should wear a facemask if they enter your room  Cover your coughs and sneezes    Cover your mouth and nose with a tissue when you cough or sneeze  Throw used tissues in a lined trash can  Immediately wash your hands with soap and water for at least 20 seconds or, if soap and water are not available, clean your hands with an alcohol-based hand  that contains at least 60% alcohol  Clean your hands often    Wash your hands often with soap and water for at least 20 seconds, especially after blowing your nose, coughing, or sneezing; going to the bathroom; and before eating or preparing food  If soap and water are not readily available, use an alcohol-based hand  with at least 60% alcohol, covering all surfaces of your hands and rubbing them together until they feel dry  Soap and water are the best option if hands are visibly dirty  Avoid touching your eyes, nose, and mouth with unwashed hands      Avoid sharing personal household items    You should not share dishes, drinking glasses, cups, eating utensils, towels, or bedding with other people or pets in your home  After using these items, they should be washed thoroughly with soap and water  Clean all high-touch surfaces everyday    High touch surfaces include counters, tabletops, doorknobs, bathroom fixtures, toilets, phones, keyboards, tablets, and bedside tables  Also, clean any surfaces that may have blood, stool, or body fluids on them  Use a household cleaning spray or wipe, according to the label instructions  Labels contain instructions for safe and effective use of the cleaning product including precautions you should take when applying the product, such as wearing gloves and making sure you have good ventilation during use of the product  Monitor your symptoms    Seek prompt medical attention if your illness is worsening (e g , difficulty breathing)  Before seeking care, call your healthcare provider and tell them that you have, or are being evaluated for, COVID-19  Put on a facemask before you enter the facility  These steps will help the healthcare providers office to keep other people in the office or waiting room from getting infected or exposed  Ask your healthcare provider to call the local or [facility] health department  Persons who are placed under active monitoring or facilitated self-monitoring should follow instructions provided by their local health department or occupational health professionals, as appropriate  If you have a medical emergency and need to call 911, notify the dispatch personnel that you have, or are being evaluated for COVID-19  If possible, put on a facemask before emergency medical services arrive      Discontinuing home isolation    Patients with confirmed COVID-19 should remain under home isolation precautions until the following conditions are met:   - They have had no fever for at least 24 hours (that is one full day of no fever without the use medicine that reduces fevers)  AND  - other symptoms have improved (for example, when their cough or shortness of breath have improved)  AND  - If had mild or moderate illness, at least 10 days have passed since their symptoms first appeared or if severe illness (needed oxygen) or immunosuppressed, at least 20 days have passed since symptoms first appeared  Patients with confirmed COVID-19 should also notify close contacts (including their workplace) and ask that they self-quarantine  Currently, close contact is defined as being within 6 feet for 15 minutes or more from the period 24 hours starting 48 hours before symptom onset to the time at which the patient went into isolation  Close contacts of patients diagnosed with COVID-19 should be instructed by the patient to self-quarantine for 14 days from the last time of their last contact with the patient       Source: [employer]

## 2022-01-16 LAB — SARS-COV-2 RNA RESP QL NAA+PROBE: NEGATIVE

## 2022-08-27 ENCOUNTER — APPOINTMENT (EMERGENCY)
Dept: CT IMAGING | Facility: HOSPITAL | Age: 37
End: 2022-08-27
Payer: COMMERCIAL

## 2022-08-27 ENCOUNTER — HOSPITAL ENCOUNTER (EMERGENCY)
Facility: HOSPITAL | Age: 37
Discharge: HOME | End: 2022-08-27
Attending: EMERGENCY MEDICINE
Payer: COMMERCIAL

## 2022-08-27 VITALS
OXYGEN SATURATION: 100 % | HEIGHT: 62 IN | RESPIRATION RATE: 14 BRPM | BODY MASS INDEX: 42.33 KG/M2 | DIASTOLIC BLOOD PRESSURE: 66 MMHG | HEART RATE: 79 BPM | TEMPERATURE: 98.1 F | WEIGHT: 230 LBS | SYSTOLIC BLOOD PRESSURE: 117 MMHG

## 2022-08-27 DIAGNOSIS — R20.2 PARESTHESIA: ICD-10-CM

## 2022-08-27 DIAGNOSIS — R51.9 ACUTE NONINTRACTABLE HEADACHE, UNSPECIFIED HEADACHE TYPE: Primary | ICD-10-CM

## 2022-08-27 LAB
ALBUMIN SERPL BCP-MCNC: 3.7 G/DL (ref 3.5–5)
ALP SERPL-CCNC: 67 U/L (ref 46–116)
ALT SERPL W P-5'-P-CCNC: 23 U/L (ref 12–78)
ANION GAP SERPL CALCULATED.3IONS-SCNC: 10 MMOL/L (ref 4–13)
AST SERPL W P-5'-P-CCNC: 18 U/L (ref 5–45)
BACTERIA UR QL AUTO: NORMAL /HPF
BASOPHILS # BLD AUTO: 0.06 THOUSANDS/ΜL (ref 0–0.1)
BASOPHILS NFR BLD AUTO: 1 % (ref 0–1)
BILIRUB SERPL-MCNC: 0.25 MG/DL (ref 0.2–1)
BILIRUB UR QL STRIP: NEGATIVE
BUN SERPL-MCNC: 11 MG/DL (ref 5–25)
CALCIUM SERPL-MCNC: 9 MG/DL (ref 8.3–10.1)
CHLORIDE SERPL-SCNC: 103 MMOL/L (ref 96–108)
CLARITY UR: CLEAR
CO2 SERPL-SCNC: 26 MMOL/L (ref 21–32)
COLOR UR: YELLOW
CREAT SERPL-MCNC: 0.96 MG/DL (ref 0.6–1.3)
EOSINOPHIL # BLD AUTO: 0.09 THOUSAND/ΜL (ref 0–0.61)
EOSINOPHIL NFR BLD AUTO: 1 % (ref 0–6)
ERYTHROCYTE [DISTWIDTH] IN BLOOD BY AUTOMATED COUNT: 13.8 % (ref 11.6–15.1)
EXT PREG TEST URINE: NEGATIVE
EXT. CONTROL ED NAV: NORMAL
GFR SERPL CREATININE-BSD FRML MDRD: 76 ML/MIN/1.73SQ M
GLUCOSE SERPL-MCNC: 95 MG/DL (ref 65–140)
GLUCOSE UR STRIP-MCNC: NEGATIVE MG/DL
HCT VFR BLD AUTO: 41.4 % (ref 34.8–46.1)
HGB BLD-MCNC: 13.5 G/DL (ref 11.5–15.4)
HGB UR QL STRIP.AUTO: ABNORMAL
IMM GRANULOCYTES # BLD AUTO: 0.03 THOUSAND/UL (ref 0–0.2)
IMM GRANULOCYTES NFR BLD AUTO: 0 % (ref 0–2)
KETONES UR STRIP-MCNC: NEGATIVE MG/DL
LEUKOCYTE ESTERASE UR QL STRIP: NEGATIVE
LYMPHOCYTES # BLD AUTO: 2.78 THOUSANDS/ΜL (ref 0.6–4.47)
LYMPHOCYTES NFR BLD AUTO: 28 % (ref 14–44)
MCH RBC QN AUTO: 29.8 PG (ref 26.8–34.3)
MCHC RBC AUTO-ENTMCNC: 32.6 G/DL (ref 31.4–37.4)
MCV RBC AUTO: 91 FL (ref 82–98)
MONOCYTES # BLD AUTO: 0.5 THOUSAND/ΜL (ref 0.17–1.22)
MONOCYTES NFR BLD AUTO: 5 % (ref 4–12)
NEUTROPHILS # BLD AUTO: 6.48 THOUSANDS/ΜL (ref 1.85–7.62)
NEUTS SEG NFR BLD AUTO: 65 % (ref 43–75)
NITRITE UR QL STRIP: NEGATIVE
NON-SQ EPI CELLS URNS QL MICRO: NORMAL /HPF
NRBC BLD AUTO-RTO: 0 /100 WBCS
PH UR STRIP.AUTO: 6 [PH]
PLATELET # BLD AUTO: 365 THOUSANDS/UL (ref 149–390)
PMV BLD AUTO: 10.2 FL (ref 8.9–12.7)
POTASSIUM SERPL-SCNC: 4.1 MMOL/L (ref 3.5–5.3)
PROT SERPL-MCNC: 8.2 G/DL (ref 6.4–8.4)
PROT UR STRIP-MCNC: NEGATIVE MG/DL
RBC # BLD AUTO: 4.53 MILLION/UL (ref 3.81–5.12)
RBC #/AREA URNS AUTO: NORMAL /HPF
SODIUM SERPL-SCNC: 139 MMOL/L (ref 135–147)
SP GR UR STRIP.AUTO: 1.01 (ref 1–1.03)
UROBILINOGEN UR QL STRIP.AUTO: 0.2 E.U./DL
WBC # BLD AUTO: 9.94 THOUSAND/UL (ref 4.31–10.16)
WBC #/AREA URNS AUTO: NORMAL /HPF

## 2022-08-27 PROCEDURE — 96375 TX/PRO/DX INJ NEW DRUG ADDON: CPT

## 2022-08-27 PROCEDURE — G1004 CDSM NDSC: HCPCS

## 2022-08-27 PROCEDURE — 70498 CT ANGIOGRAPHY NECK: CPT

## 2022-08-27 PROCEDURE — 70496 CT ANGIOGRAPHY HEAD: CPT

## 2022-08-27 PROCEDURE — 36415 COLL VENOUS BLD VENIPUNCTURE: CPT | Performed by: EMERGENCY MEDICINE

## 2022-08-27 PROCEDURE — 99285 EMERGENCY DEPT VISIT HI MDM: CPT | Performed by: EMERGENCY MEDICINE

## 2022-08-27 PROCEDURE — 80053 COMPREHEN METABOLIC PANEL: CPT | Performed by: EMERGENCY MEDICINE

## 2022-08-27 PROCEDURE — 81001 URINALYSIS AUTO W/SCOPE: CPT | Performed by: EMERGENCY MEDICINE

## 2022-08-27 PROCEDURE — 96374 THER/PROPH/DIAG INJ IV PUSH: CPT

## 2022-08-27 PROCEDURE — 99284 EMERGENCY DEPT VISIT MOD MDM: CPT

## 2022-08-27 PROCEDURE — 96361 HYDRATE IV INFUSION ADD-ON: CPT

## 2022-08-27 PROCEDURE — 93005 ELECTROCARDIOGRAM TRACING: CPT

## 2022-08-27 PROCEDURE — 85025 COMPLETE CBC W/AUTO DIFF WBC: CPT | Performed by: EMERGENCY MEDICINE

## 2022-08-27 PROCEDURE — 81025 URINE PREGNANCY TEST: CPT | Performed by: EMERGENCY MEDICINE

## 2022-08-27 RX ORDER — KETOROLAC TROMETHAMINE 30 MG/ML
15 INJECTION, SOLUTION INTRAMUSCULAR; INTRAVENOUS ONCE
Status: COMPLETED | OUTPATIENT
Start: 2022-08-27 | End: 2022-08-27

## 2022-08-27 RX ORDER — METOCLOPRAMIDE HYDROCHLORIDE 5 MG/ML
10 INJECTION INTRAMUSCULAR; INTRAVENOUS ONCE
Status: COMPLETED | OUTPATIENT
Start: 2022-08-27 | End: 2022-08-27

## 2022-08-27 RX ADMIN — KETOROLAC TROMETHAMINE 15 MG: 30 INJECTION, SOLUTION INTRAMUSCULAR at 16:54

## 2022-08-27 RX ADMIN — SODIUM CHLORIDE 1000 ML: 0.9 INJECTION, SOLUTION INTRAVENOUS at 14:25

## 2022-08-27 RX ADMIN — IOHEXOL 70 ML: 350 INJECTION, SOLUTION INTRAVENOUS at 15:22

## 2022-08-27 RX ADMIN — METOCLOPRAMIDE HYDROCHLORIDE 10 MG: 5 INJECTION INTRAMUSCULAR; INTRAVENOUS at 16:51

## 2022-08-27 NOTE — ED PROVIDER NOTES
Pt Name: Fatmata Rollins  MRN: [id number]  [place] 1985  Age/Sex: 29 y o  female  Date of evaluation: 2022  PCP: Mayra Valencia, [address] [place] [address]    Chief Complaint   Patient presents with    Headache     Patient co headache and numbness and tingling on left side of face down arm  Symptoms started this morning  HPI    29 y o  female presenting with headache as well as tingling  Patient states that she woke this morning around 8:00 a m  With a headache, this headache was sharp, the left side of the head, radiating throughout the head well as down left side of neck, worse with lights or noise and better rest   She states the headache has been coming going all day, is currently absent after having some Aleve  She also complains of some tingling to the left side of her face and head as well as the entire left arm, hand, fingers  Patient still able to feel things and has no weakness but states that it feels slightly tingly  She denies fevers, trauma, nausea, vomiting diarrhea, neck stiffness, neck pain, chest pain, shortness of breath, changes in speech or vision, weakness, other symptoms  Patient denies prior episodes        HPI      Past Medical and Surgical History    Past Medical History:   Diagnosis Date    Elevated cholesterol     Gestational diabetes mellitus, antepartum 2015       Past Surgical History:   Procedure Laterality Date     SECTION      DILATION AND CURETTAGE OF UTERUS         Family History   Problem Relation Age of Onset    Diabetes Mother        Social History     Tobacco Use    Smoking status: Never Smoker    Smokeless tobacco: Never Used   Vaping Use    Vaping Use: Never used   Substance Use Topics    Alcohol use: Never    Drug use: Never           Allergies    Allergies   Allergen Reactions    Penicillins      Family hx of severe reaction - never taken    Zithromax [Azithromycin] GI Intolerance    Clindamycin Rash       Home Medications    Prior to Admission medications    Medication Sig Start Date End Date Taking? Authorizing Provider   albuterol (Ventolin HFA) 90 mcg/act inhaler Inhale 2 puffs every 6 (six) hours as needed for wheezing or shortness of breath 1/15/22   HERNÁN Doan   cetirizine (ZyrTEC) 10 mg tablet Take 10 mg by mouth as needed   Patient not taking: Reported on 1/15/2022     Historical Provider, MD   ibuprofen (MOTRIN) 800 mg tablet Take by mouth every 6 (six) hours as needed for mild pain  Patient not taking: Reported on 1/15/2022     Historical Provider, MD   norethindrone-ethinyl estradiol (Gisselle Velez) 0 4-35 MG-MCG per tablet Take 1 tablet by mouth daily 12/2/21 12/2/22  Historical Provider, MD   norgestimate-ethinyl estradiol (ORTHO-CYCLEN) 0 25-35 MG-MCG per tablet Take 1 tablet by mouth daily 1/6/21 1/6/22  Historical Provider, MD   Pyridoxine HCl (VITAMIN B6 PO) Take by mouth daily  Patient not taking: Reported on 9/8/2021    Historical Provider, MD           Review of Systems    Review of Systems   Constitutional: Negative for activity change, chills and fever  HENT: Negative for drooling and facial swelling  Eyes: Negative for pain, discharge and visual disturbance  Respiratory: Negative for apnea, cough, chest tightness, shortness of breath and wheezing  Cardiovascular: Negative for chest pain and leg swelling  Gastrointestinal: Negative for abdominal pain, constipation, diarrhea, nausea and vomiting  Genitourinary: Negative for difficulty urinating, dysuria and urgency  Musculoskeletal: Negative for arthralgias, back pain and gait problem  Skin: Negative for color change and rash  Neurological: Positive for headaches  Negative for dizziness, speech difficulty and weakness  Psychiatric/Behavioral: Negative for agitation, behavioral problems and confusion  All other systems reviewed and negative      Physical Exam      ED Triage Vitals   Temperature Pulse Respirations Blood Pressure SpO2   08/27/22 1400 08/27/22 1400 08/27/22 1400 08/27/22 1400 08/27/22 1400   98 1 °F (36 7 °C) 90 16 124/67 98 %      Temp Source Heart Rate Source Patient Position - Orthostatic VS BP Location FiO2 (%)   08/27/22 1400 08/27/22 1400 08/27/22 1400 08/27/22 1400 --   Oral Monitor Sitting Left arm       Pain Score       08/27/22 1640       5               Physical Exam  Vitals and nursing note reviewed  Constitutional:       General: She is not in acute distress  Appearance: She is well-developed  She is not ill-appearing, toxic-appearing or diaphoretic  HENT:      Head: Normocephalic and atraumatic  Right Ear: External ear normal       Left Ear: External ear normal    Eyes:      Conjunctiva/sclera: Conjunctivae normal       Pupils: Pupils are equal, round, and reactive to light  Cardiovascular:      Rate and Rhythm: Normal rate and regular rhythm  Heart sounds: Normal heart sounds  Pulmonary:      Effort: Pulmonary effort is normal  No respiratory distress  Breath sounds: Normal breath sounds  No wheezing or rales  Abdominal:      General: There is no distension  Palpations: Abdomen is soft  Tenderness: There is no abdominal tenderness  There is no guarding or rebound  Musculoskeletal:         General: No deformity  Normal range of motion  Cervical back: Normal range of motion and neck supple  Skin:     General: Skin is warm and dry  Findings: No erythema or rash  Neurological:      General: No focal deficit present  Mental Status: She is alert and oriented to person, place, and time  Cranial Nerves: No cranial nerve deficit  Sensory: No sensory deficit  Motor: No weakness  Coordination: Coordination normal       Gait: Gait normal       Comments: Cranial nerves 2-12 intact, 5/5 strength in all extremities, normal speech and coordination  No visual field cuts  Ambulating with normal steady gait without difficulty or assistance  Psychiatric:         Behavior: Behavior normal          Thought Content: Thought content normal          Judgment: Judgment normal               Diagnostic Results  EKG Interpretation    Rate:  72  BPM  Rhythm:  Normal Sinus Rhythm   Axis:  Normal   Intervals: Normal, no blocks, QTc  438 ms  Q waves:  No pathologic Q waves   T waves:  Normal   ST segments:  No significant elevations or depressions     Impression:  Normal sinus rhythm without evidence of acute ischemia or significant arrhythmia      EKG for comparison:  EKG dated 12 February 2021 similar in character with no major changes    EKG interpreted by me         Labs:    Results Reviewed     Procedure Component Value Units Date/Time    Urine Microscopic [[id number]]  (Normal) Collected: 08/27/22 1527    Lab Status: Final result Specimen: Urine, Clean Catch Updated: 08/27/22 1643     RBC, UA None Seen /hpf      WBC, UA None Seen /hpf      Epithelial Cells Occasional /hpf      Bacteria, UA None Seen /hpf     UA (URINE) with reflex to Scope [[id number]]  (Abnormal) Collected: 08/27/22 1527    Lab Status: Final result Specimen: Urine, Clean Catch Updated: 08/27/22 1606     Color, UA Yellow     Clarity, UA Clear     Specific Gravity, UA 1 015     pH, UA 6 0     Leukocytes, UA Negative     Nitrite, UA Negative     Protein, UA Negative mg/dl      Glucose, UA Negative mg/dl      Ketones, UA Negative mg/dl      Urobilinogen, UA 0 2 E U /dl      Bilirubin, UA Negative     Occult Blood, UA Trace-Intact    POCT pregnancy, urine [[id number]]  (Normal) Resulted: 08/27/22 1528    Lab Status: Final result Updated: 08/27/22 1529     EXT PREG TEST UR (Ref: Negative) Negative     Control Valid    Comprehensive metabolic panel [[id number]] Collected: 08/27/22 1423    Lab Status: Final result Specimen: Blood from Arm, Right Updated: 08/27/22 1450     Sodium 139 mmol/L      Potassium 4 1 mmol/L      Chloride 103 mmol/L      CO2 26 mmol/L      ANION GAP 10 mmol/L      BUN 11 mg/dL Creatinine 0 96 mg/dL      Glucose 95 mg/dL      Calcium 9 0 mg/dL      AST 18 U/L      ALT 23 U/L      Alkaline Phosphatase 67 U/L      Total Protein 8 2 g/dL      Albumin 3 7 g/dL      Total Bilirubin 0 25 mg/dL      eGFR 76 ml/min/1 73sq m     Narrative:      National Kidney Disease Foundation guidelines for Chronic Kidney Disease (CKD):     Stage 1 with normal or high GFR (GFR > 90 mL/min/1 73 square meters)    Stage 2 Mild CKD (GFR = 60-89 mL/min/1 73 square meters)    Stage 3A Moderate CKD (GFR = 45-59 mL/min/1 73 square meters)    Stage 3B Moderate CKD (GFR = 30-44 mL/min/1 73 square meters)    Stage 4 Severe CKD (GFR = 15-29 mL/min/1 73 square meters)    Stage 5 End Stage CKD (GFR <15 mL/min/1 73 square meters)  Note: GFR calculation is accurate only with a steady state creatinine    CBC and differential [[id number]] Collected: 08/27/22 1423    Lab Status: Final result Specimen: Blood from Arm, Right Updated: 08/27/22 1435     WBC 9 94 Thousand/uL      RBC 4 53 Million/uL      Hemoglobin 13 5 g/dL      Hematocrit 41 4 %      MCV 91 fL      MCH 29 8 pg      MCHC 32 6 g/dL      RDW 13 8 %      MPV 10 2 fL      Platelets 688 Thousands/uL      nRBC 0 /100 WBCs      Neutrophils Relative 65 %      Immat GRANS % 0 %      Lymphocytes Relative 28 %      Monocytes Relative 5 %      Eosinophils Relative 1 %      Basophils Relative 1 %      Neutrophils Absolute 6 48 Thousands/µL      Immature Grans Absolute 0 03 Thousand/uL      Lymphocytes Absolute 2 78 Thousands/µL      Monocytes Absolute 0 50 Thousand/µL      Eosinophils Absolute 0 09 Thousand/µL      Basophils Absolute 0 06 Thousands/µL           All labs reviewed and utilized in the medical decision making process    Radiology:    CTA head and neck with and without contrast   Final Result      No mass effect, acute intracranial hemorrhage or CT evidence for large acute vascular distribution infarct        No evidence for high-grade stenosis, focal occlusion or vascular aneurysm of the cervical or intracranial vessels  Workstation performed: [id number]             All radiology studies independently viewed by me and interpreted by the radiologist     Procedure    Procedures    HEART score:    History 0=Slightly or non-suspicious   ECG 0=Normal   Age 0= < 45 years   Risk Factors 1= 1 or 2 risk factors   Troponin 0= Less than or equal to 12 ng/L   Total 1            ED Course of Care and Re-Assessments      Patient initially declined any pain medication, headache did recur, given Toradol as well as Reglan with resolution of pain and substantial improvement of paresthesia  Workup overall reassuring, discussed with Dr Marlen Chance on call for neurology, agreed patient stable for close outpatient follow-up with Neurology  Patient agreeable to that plan    Medications   sodium chloride 0 9 % bolus 1,000 mL (0 mL Intravenous Stopped 8/27/22 1525)   iohexol (OMNIPAQUE) 350 MG/ML injection (MULTI-DOSE) 70 mL (70 mL Intravenous Given 8/27/22 1522)   ketorolac (TORADOL) injection 15 mg (15 mg Intravenous Given 8/27/22 1654)   metoclopramide (REGLAN) injection 10 mg (10 mg Intravenous Given 8/27/22 1651)           FINAL IMPRESSION    Final diagnoses:   Acute nonintractable headache, unspecified headache type   Paresthesia         DISPOSITION/PLAN    Presentation as above with headache as well as paresthesias  Vital signs reassuring, examination likewise reassuring with nonfocal neurologic exam   Labs and imaging reassuring, patient responded well to symptomatic treatment emergency department  Some concern for possible complex migraine although cannot rule out MS or other cause of subtle symptoms at this time  Idiopathic intracranial hypertension is considered as a cause but presentation is somewhat atypical, and without any visual symptoms risks of lumbar puncture felt to outweigh potential benefits at this time    Consult regarding possibility of diagnosis should symptoms persist   Overall, low suspicion for intracranial hemorrhage, sepsis, meningitis, encephalitis, diffuse axonal injury, critically increased ICP, venous sinus thrombosis, other acute life limb or nerve threat at this time  Treated symptomatically, discharged strict return precautions, follow up primary care doctor as well as Neurology  Time reflects when diagnosis was documented in both MDM as applicable and the Disposition within this note     Time User Action Codes Description Comment    8/27/2022  5:40 PM Dominga SOMMER Add [R51 9] Acute nonintractable headache, unspecified headache type     8/27/2022  5:40 PM Megan Ghosh Add [R20 2] Paresthesia       ED Disposition     ED Disposition   Discharge    Condition   Stable    Date/Time   Sat Aug 27, 2022  5:40 PM    [facility] discharge to home/self care  Follow-up Information     Follow up With Specialties Details Why Contact Info Additional [address] [facility] Emergency Department Emergency Medicine Go to  If symptoms worsen [address] [facility] [address]  [address] [facility] Emergency Department, [address], [place], [address] , [address] [facility], Nurse Practitioner Call in 2 days Discuss this visit and schedule close outpatient follow-up [place] [address]  [address] [facility] Neurology Call in 2 days To schedule close outpatient follow-up for this visit [address] [facility] [address]  [address] Neurology [address] [facility], [facility], [place], [place], [address]            PATIENT REFERRED TO:    [address] [facility] Emergency Department  [address] [facility] [address] [phone]  Go to   If symptoms worsen    [facility], [address]  [address]  [place] [id number]  [phone]    Call in 2 days  Discuss this visit and schedule close outpatient follow-up    [facility]  [address] [facility]  [phone]  Call in 2 days  To schedule close outpatient follow-up for this visit      DISCHARGE MEDICATIONS:    Discharge Medication List as of 8/27/2022  5:41 PM      CONTINUE these medications which have NOT CHANGED    Details   albuterol (Ventolin HFA) 90 mcg/act inhaler Inhale 2 puffs every 6 (six) hours as needed for wheezing or shortness of breath, Starting Sat 1/15/2022, Normal      cetirizine (ZyrTEC) 10 mg tablet Take 10 mg by mouth as needed, Historical Med      ibuprofen (MOTRIN) 800 mg tablet Take by mouth every 6 (six) hours as needed for mild pain, Historical Med      Pyridoxine HCl (VITAMIN B6 PO) Take by mouth daily, Historical Med      norethindrone-ethinyl estradiol (OVCON) 0 4-35 MG-MCG per tablet Take 1 tablet by mouth daily, Starting Thu 12/2/2021, Until Fri 12/2/2022, Historical Med      norgestimate-ethinyl estradiol (ORTHO-CYCLEN) 0 25-35 MG-MCG per tablet Take 1 tablet by mouth daily, Starting Wed 1/6/2021, Until Thu 1/6/2022, Verónica Harrell MD    Portions of the record may have been created with voice recognition software  Occasional wrong word or "sound alike" substitutions may have occurred due to the inherent limitations of voice recognition software    Please read the chart carefully and recognize, using context, where substitutions have occurred     Tim Sanches MD  08/27/22 [id number]       Tim Sanches MD  08/28/22 [id number]

## 2022-08-28 LAB
ATRIAL RATE: 72 BPM
P AXIS: 49 DEGREES
PR INTERVAL: 120 MS
QRS AXIS: 52 DEGREES
QRSD INTERVAL: 86 MS
QT INTERVAL: 400 MS
QTC INTERVAL: 438 MS
T WAVE AXIS: 14 DEGREES
VENTRICULAR RATE: 72 BPM

## 2022-08-28 PROCEDURE — 93010 ELECTROCARDIOGRAM REPORT: CPT | Performed by: INTERNAL MEDICINE

## 2022-09-20 ENCOUNTER — OFFICE VISIT (OUTPATIENT)
Dept: FAMILY MEDICINE CLINIC | Facility: CLINIC | Age: 37
End: 2022-09-20
Payer: COMMERCIAL

## 2022-09-20 VITALS — TEMPERATURE: 97.5 F | HEART RATE: 86 BPM | OXYGEN SATURATION: 96 %

## 2022-09-20 DIAGNOSIS — J01.10 ACUTE NON-RECURRENT FRONTAL SINUSITIS: Primary | ICD-10-CM

## 2022-09-20 LAB
SARS-COV-2 AG UPPER RESP QL IA: NEGATIVE
VALID CONTROL: NORMAL

## 2022-09-20 PROCEDURE — 87811 SARS-COV-2 COVID19 W/OPTIC: CPT | Performed by: NURSE PRACTITIONER

## 2022-09-20 PROCEDURE — 99213 OFFICE O/P EST LOW 20 MIN: CPT | Performed by: NURSE PRACTITIONER

## 2022-09-20 RX ORDER — LEVOFLOXACIN 500 MG/1
500 TABLET, FILM COATED ORAL DAILY
Qty: 10 TABLET | Refills: 0 | Status: SHIPPED | OUTPATIENT
Start: 2022-09-20 | End: 2022-09-30

## 2022-09-20 NOTE — PROGRESS NOTES
OFFICE VISIT  Annabelle Capps 29 y o  female MRN: [id number]          Assessment / Plan:  Problem List Items Addressed This Visit        Respiratory    Acute non-recurrent frontal sinusitis - Primary     You have been prescribed an antibiotic  This medication is used to treat bacterial infections  Follow the directions as prescribed  Do not share this medication with anyone  Do not stop taking her medication until it is finished, even if you are feeling better  Taking medication with a full glass of water  Call the office if you experience any possible side effects  I recommend that the patient takes an over the counter probiotic or eats yogurt with live cultures in it [place]) to keep good bacteria in the gut and help prevent diarrhea  Wash hands frequently to prevent the spread of infection  Ibuprofen and/or tylenol as needed for pain or fever  If not improving over the next 7-10 days, call office  Relevant Medications    levofloxacin (LEVAQUIN) 500 mg tablet    Other Relevant Orders    POCT Rapid Covid Ag (Completed)            Reason For Visit / Chief Complaint  Chief Complaint   Patient presents with    Cold Like Symptoms     X2 days pt c/o - sinus, ear ache issues  At home tested neg yesterday         HPI:  Annabelle Capps is a 29 y o  female Who presents today for acute sick visit  She is establish [facility] and unable to get in to see her provider today  She comes to this office for acute care only  She reports for 2 days feeling sinus pain sinus pressure, runny nose, bilateral earache and sore throat  She denies any cough for shortness of breath fever chills  She does report 2 children at home with similar symptoms to has been seen by their pediatrician in being treated with antibiotics  She did test at home before COVID which was negative      Historical Information   Past Medical History:   Diagnosis Date    Elevated cholesterol     Gestational diabetes mellitus, antepartum 2015     Past Surgical History:   Procedure Laterality Date     SECTION      DILATION AND CURETTAGE OF UTERUS       Social History   Social History     Substance and Sexual Activity   Alcohol Use Never     Social History     Substance and Sexual Activity   Drug Use Never     Social History     Tobacco Use   Smoking Status Never Smoker   Smokeless Tobacco Never Used     Family History   Problem Relation Age of Onset    Diabetes Mother        Meds/Allergies   Allergies   Allergen Reactions    Penicillins      Family hx of severe reaction - never taken    Zithromax [Azithromycin] GI Intolerance    Clindamycin Rash       Meds:    Current Outpatient Medications:     levofloxacin (LEVAQUIN) 500 mg tablet, Take 1 tablet (500 mg total) by mouth daily for 10 days, Disp: 10 tablet, Rfl: 0    albuterol (Ventolin HFA) 90 mcg/act inhaler, Inhale 2 puffs every 6 (six) hours as needed for wheezing or shortness of breath, Disp: 18 g, Rfl: 0    cetirizine (ZyrTEC) 10 mg tablet, Take 10 mg by mouth as needed, Disp: , Rfl:     ibuprofen (MOTRIN) 800 mg tablet, Take by mouth every 6 (six) hours as needed for mild pain, Disp: , Rfl:     norethindrone-ethinyl estradiol (OVCON) 0 4-35 MG-MCG per tablet, Take 1 tablet by mouth daily, Disp: , Rfl:     norgestimate-ethinyl estradiol (ORTHO-CYCLEN) 0 25-35 MG-MCG per tablet, Take 1 tablet by mouth daily, Disp: , Rfl:     Pyridoxine HCl (VITAMIN B6 PO), Take by mouth daily, Disp: , Rfl:       REVIEW OF SYSTEMS  Review of Systems   Constitutional: Negative for activity change, chills, fatigue and fever  HENT: Positive for congestion, rhinorrhea, sinus pressure, sinus pain and sore throat  Negative for ear discharge, ear pain, tinnitus and trouble swallowing  Eyes: Negative for photophobia, pain, discharge, itching and visual disturbance  Respiratory: Negative for cough, chest tightness, shortness of breath and wheezing      Cardiovascular: Negative for chest pain and leg swelling  Gastrointestinal: Negative for abdominal distention, abdominal pain, constipation, diarrhea, nausea and vomiting  Endocrine: Negative for polydipsia, polyphagia and polyuria  Genitourinary: Negative for dysuria and frequency  Musculoskeletal: Negative for arthralgias, myalgias, neck pain and neck stiffness  Skin: Negative for color change  Neurological: Negative for dizziness, syncope, weakness, numbness and headaches  Hematological: Does not bruise/bleed easily  Psychiatric/Behavioral: Negative for behavioral problems, confusion, self-injury, sleep disturbance and suicidal ideas  The patient is not nervous/anxious  Current Vitals:   Pulse: 86 (09/20/22 1332)  Temperature: 97 5 °F (36 4 °C) (09/20/22 1332)  SpO2: 96 % (09/20/22 1332)  [unfilled]    PHYSICAL EXAMS:  Physical Exam  Constitutional:       Appearance: She is not ill-appearing  HENT:      Head: Normocephalic and atraumatic  Pulmonary:      Effort: Pulmonary effort is normal    Neurological:      General: No focal deficit present  Mental Status: She is alert and oriented to person, place, and time  Psychiatric:         Mood and Affect: Mood normal          Behavior: Behavior normal              Lab, imaging and other studies: I have personally reviewed pertinent reports  Gavin Willingham

## 2022-11-19 PROBLEM — J01.10 ACUTE NON-RECURRENT FRONTAL SINUSITIS: Status: RESOLVED | Noted: 2022-09-20 | Resolved: 2022-11-19

## 2023-01-19 ENCOUNTER — TELEMEDICINE (OUTPATIENT)
Dept: FAMILY MEDICINE CLINIC | Facility: CLINIC | Age: 38
End: 2023-01-19

## 2023-01-19 VITALS — BODY MASS INDEX: 42.33 KG/M2 | TEMPERATURE: 99 F | HEIGHT: 62 IN | WEIGHT: 230 LBS

## 2023-01-19 DIAGNOSIS — J01.00 ACUTE NON-RECURRENT MAXILLARY SINUSITIS: ICD-10-CM

## 2023-01-19 DIAGNOSIS — R05.9 COUGH: Primary | ICD-10-CM

## 2023-01-19 LAB
SARS-COV-2 AG UPPER RESP QL IA: NEGATIVE
VALID CONTROL: NORMAL

## 2023-01-19 RX ORDER — DOXYCYCLINE HYCLATE 100 MG/1
100 CAPSULE ORAL EVERY 12 HOURS SCHEDULED
Qty: 14 CAPSULE | Refills: 0 | Status: SHIPPED | OUTPATIENT
Start: 2023-01-19 | End: 2023-01-26

## 2023-01-19 RX ORDER — ALBUTEROL SULFATE 90 UG/1
2 AEROSOL, METERED RESPIRATORY (INHALATION) EVERY 6 HOURS PRN
Qty: 18 G | Refills: 0 | Status: SHIPPED | OUTPATIENT
Start: 2023-01-19

## 2023-01-19 RX ORDER — LEVONORGESTREL AND ETHINYL ESTRADIOL AND ETHINYL ESTRADIOL 150-30(84)
1 KIT ORAL DAILY
COMMUNITY
Start: 2022-12-21

## 2023-01-19 NOTE — PROGRESS NOTES
Virtual Regular Visit    Verification of patient location:    Patient is located in the following state in which I hold an active license [place]      Assessment/Plan:    Problem List Items Addressed This Visit        Other    Cough - Primary     Patient with sinus/URI Symptoms dw patient will check for COVID to evaluate         Relevant Medications    albuterol (Ventolin HFA) 90 mcg/act inhaler    Other Relevant Orders    POCT Rapid Covid Ag (Completed)   Other Visit Diagnoses     Acute non-recurrent maxillary sinusitis        Relevant Medications    doxycycline hyclate (VIBRAMYCIN) 100 mg capsule               Reason for visit is   Chief Complaint   Patient presents with   • Virtual Brief Visit     Head congestion    • Virtual Regular Visit        Encounter provider HERNÁN Willis    Provider located at [place] [address]  [address] [place] [id number]      Recent Visits  No visits were found meeting these conditions  Showing recent visits within past 7 days and meeting all other requirements  Today's Visits  Date Type Provider Dept   01/19/23 Telemedicine HERNÁN Willis [facility]   Showing today's visits and meeting all other requirements  Future Appointments  No visits were found meeting these conditions  Showing future appointments within next 150 days and meeting all other requirements       The patient was identified by name and date of birth  Gisele Frederick was informed that this is a telemedicine visit and that the visit is being conducted through the [employer]  She agrees to proceed     My office door was closed  No one else was in the room  She acknowledged consent and understanding of privacy and security of the video platform  The patient has agreed to participate and understands they can discontinue the visit at any time  Patient is aware this is a billable service       Azalea Dunlap is a 40 y o  female        Patient calling today and reports that she is having lots of sinus pain and pressure and feeling like she is having lots of congestion and positive sick contacts with similar symptoms  Patient both children has similar symptoms        Past Medical History:   Diagnosis Date   • Elevated cholesterol    • Gestational diabetes mellitus, antepartum 2015       Past Surgical History:   Procedure Laterality Date   •  SECTION     • DILATION AND CURETTAGE OF UTERUS         Current Outpatient Medications   Medication Sig Dispense Refill   • albuterol (Ventolin HFA) 90 mcg/act inhaler Inhale 2 puffs every 6 (six) hours as needed for wheezing or shortness of breath 18 g 0   • Ashlyna 0 15-0 03 &0 01 MG TABS Take 1 tablet by mouth daily     • doxycycline hyclate (VIBRAMYCIN) 100 mg capsule Take 1 capsule (100 mg total) by mouth every 12 (twelve) hours for 7 days 14 capsule 0     No current facility-administered medications for this visit  Allergies   Allergen Reactions   • Penicillins      Family hx of severe reaction - never taken   • Zithromax [Azithromycin] GI Intolerance   • Clindamycin Rash       Review of Systems   Constitutional: Positive for fatigue  HENT: Positive for congestion, postnasal drip, rhinorrhea and sinus pain  Eyes: Negative  Respiratory: Positive for cough  Cardiovascular: Negative  Gastrointestinal: Negative  Endocrine: Negative  Genitourinary: Negative  Allergic/Immunologic: Negative  Neurological: Negative  Hematological: Negative  Psychiatric/Behavioral: Negative  Video Exam    Vitals:    23 0955   Temp: 99 °F (37 2 °C)   Weight: 104 kg (230 lb)   Height: 5' 2" (1 575 m)       Physical Exam  HENT:      Head: Normocephalic  Nose: Congestion and rhinorrhea present  Mouth/Throat:      Mouth: Mucous membranes are moist    Pulmonary:      Breath sounds: Rhonchi present  Abdominal:      Palpations: Abdomen is soft  Neurological:      Mental Status: She is alert and oriented to person, place, and time     Psychiatric:         Mood and Affect: Mood normal           I spent 10 minutes directly with the patient during this visit

## 2023-03-20 PROBLEM — R05.9 COUGH: Status: RESOLVED | Noted: 2023-01-19 | Resolved: 2023-03-20

## 2023-05-28 ENCOUNTER — OFFICE VISIT (OUTPATIENT)
Dept: URGENT CARE | Facility: CLINIC | Age: 38
End: 2023-05-28

## 2023-05-28 VITALS
BODY MASS INDEX: 42.07 KG/M2 | SYSTOLIC BLOOD PRESSURE: 135 MMHG | OXYGEN SATURATION: 100 % | WEIGHT: 230 LBS | TEMPERATURE: 97 F | HEART RATE: 78 BPM | DIASTOLIC BLOOD PRESSURE: 63 MMHG

## 2023-05-28 DIAGNOSIS — H65.191 OTHER NON-RECURRENT ACUTE NONSUPPURATIVE OTITIS MEDIA OF RIGHT EAR: ICD-10-CM

## 2023-05-28 DIAGNOSIS — J02.9 SORE THROAT: Primary | ICD-10-CM

## 2023-05-28 LAB — S PYO AG THROAT QL: NEGATIVE

## 2023-05-28 RX ORDER — AZITHROMYCIN 250 MG/1
TABLET, FILM COATED ORAL
Qty: 6 TABLET | Refills: 0 | Status: SHIPPED | OUTPATIENT
Start: 2023-05-28 | End: 2023-06-01

## 2023-05-28 NOTE — PATIENT INSTRUCTIONS
Take antibiotics as prescribed  Fluids and rest  Tylenol/Ibuprofen for discomfort     Over the counter decongestants as needed    Follow up with PCP in 3-5 days  Consider follow up when course finished to ensure infection has resolved  Proceed to the ER if symptoms worsen  Otitis Media, Ambulatory Care   GENERAL INFORMATION:   Otitis media  is an ear infection  Common symptoms include the following:   Fever or a headache    Ear pain    Trouble hearing    Ear feels plugged or full or you have ringing or buzzing in your ear    Dizziness or you lose your balance    Nausea or vomiting  Seek immediate care for the following symptoms:   Seizure    Fever and a stiff neck  Treatment for otitis media  may include any of the following:  NSAIDs  help decrease swelling and pain or fever  This medicine is available with or without a doctor's order  NSAIDs can cause stomach bleeding or kidney problems in certain people  If you take blood thinner medicine, always ask your healthcare provider if NSAIDs are safe for you  Always read the medicine label and follow directions  Ear drops  to help treat your ear pain  Antibiotics  to help kill the germs that caused your ear infection  Care for otitis media:   Use heat  Place a warm, moist washcloth on your ear to decrease pain  Apply for 15 to 20 minutes, 3 to 4 times a day    Use ice  Ice helps decrease swelling and pain  Use an ice pack or put crushed ice in a plastic bag  Cover the ice pack with a towel and place it on your ear for 15 to 20 minutes, 3 to 4 times a day for 2 days  Prevent otitis media:   Wash your hands often  This will help prevent the spread of germs  Encourage everyone in your house to wash their hands with soap and water after they use the bathroom  Everyone should also wash their hands after they change a child's diaper and before they prepare or eat food  Stay away from people who are ill    Germs are easily and quickly spread through contact  Follow up with your healthcare provider as directed:  Write down your questions so you remember to ask them during your visits  CARE AGREEMENT:   You have the right to help plan your care  Learn about your health condition and how it may be treated  Discuss treatment options with your caregivers to decide what care you want to receive  You always have the right to refuse treatment  The above information is an  only  It is not intended as medical advice for individual conditions or treatments  Talk to your doctor, nurse or pharmacist before following any medical regimen to see if it is safe and effective for you  © 2014 [address] is for End User's use only and may not be sold, redistributed or otherwise used for commercial purposes  All illustrations and images included in CareNotes® are the copyrighted property of A D A M , Inc  or [employer]

## 2023-05-28 NOTE — PROGRESS NOTES
[facility]        NAME: Laura Weeks is a 40 y o  female  : 1985    MRN: [id number]  DATE: May 28, 2023  TIME: 9:09 AM    Assessment and Plan   Sore throat [J02 9]  1  Sore throat  POCT rapid strepA    Throat culture      2  Other non-recurrent acute nonsuppurative otitis media of right ear  azithromycin (ZITHROMAX) 250 mg tablet        Rapid strep negative  Will send for culture  Patient Instructions     Take antibiotics as prescribed  Fluids and rest  Tylenol/Ibuprofen for discomfort     Over the counter decongestants as needed    Follow up with PCP in 3-5 days  Consider follow up when course finished to ensure infection has resolved  Proceed to the ER if symptoms worsen  Chief Complaint     Chief Complaint   Patient presents with   • Sore Throat     Pt c/o horse voice for the last 2 weeks  Today woke up to a sore throat and right ear pain  History of Present Illness       The patient presents today with complaints of R ear pain, PND, and sore throat that started this morning  She has been having a hoarse voice for the past 2 weeks  She has been taking claritin as needed  Denies fever/chills, nasal congestion, cough, SOB, or exposure to known sick contacts  Review of Systems   Review of Systems   Constitutional: Negative for chills, fatigue and fever  HENT: Positive for ear pain (right), postnasal drip and voice change (hoarse voice)  Negative for congestion, rhinorrhea, sinus pressure, sinus pain and sore throat  Eyes: Negative  Respiratory: Negative for cough and shortness of breath  Cardiovascular: Negative for chest pain and palpitations  Gastrointestinal: Negative for abdominal pain, diarrhea, nausea and vomiting  Genitourinary: Negative for difficulty urinating  Musculoskeletal: Negative for myalgias  Skin: Negative for rash  Allergic/Immunologic: Negative for environmental allergies  Neurological: Negative for dizziness and headaches  Psychiatric/Behavioral: Negative  Current Medications       Current Outpatient Medications:   •  azithromycin (ZITHROMAX) 250 mg tablet, Take 2 tablets on day one, then one tablet daily for 4 days  , Disp: 6 tablet, Rfl: 0  •  albuterol (Ventolin HFA) 90 mcg/act inhaler, Inhale 2 puffs every 6 (six) hours as needed for wheezing or shortness of breath, Disp: 18 g, Rfl: 0  •  Ashlyna 0 15-0 03 &0 01 MG TABS, Take 1 tablet by mouth daily, Disp: , Rfl:     Current Allergies     Allergies as of 2023 - Reviewed 2023   Allergen Reaction Noted   • Penicillins  2017   • Zithromax [azithromycin] GI Intolerance 2017   • Clindamycin Rash 2017            The following portions of the patient's history were reviewed and updated as appropriate: allergies, current medications, past family history, past medical history, past social history, past surgical history and problem list      Past Medical History:   Diagnosis Date   • Elevated cholesterol    • Gestational diabetes mellitus, antepartum 2015       Past Surgical History:   Procedure Laterality Date   •  SECTION     • DILATION AND CURETTAGE OF UTERUS         Family History   Problem Relation Age of Onset   • Diabetes Mother          Medications have been verified  Objective   /63   Pulse 78   Temp (!) 97 °F (36 1 °C)   Wt 104 kg (230 lb)   SpO2 100%   BMI 42 07 kg/m²        Physical Exam     Physical Exam  Vitals and nursing note reviewed  Constitutional:       General: She is not in acute distress  Appearance: Normal appearance  She is not ill-appearing  HENT:      Head: Normocephalic and atraumatic  Right Ear: External ear normal  There is no impacted cerumen  No foreign body  Tympanic membrane is erythematous (TM cloudy)  Tympanic membrane is not injected or bulging  Left Ear: External ear normal  There is no impacted cerumen  No foreign body   Tympanic membrane is not injected, erythematous or bulging  Nose: Nose normal  No congestion or rhinorrhea  Mouth/Throat:      Lips: Pink  Mouth: Mucous membranes are moist       Pharynx: Oropharynx is clear  Posterior oropharyngeal erythema present  No oropharyngeal exudate  Tonsils: No tonsillar exudate  Comments: Ulcers noted to pharynx and tonsils  Eyes:      General: Vision grossly intact  Extraocular Movements: Extraocular movements intact  Pupils: Pupils are equal, round, and reactive to light  Cardiovascular:      Rate and Rhythm: Normal rate and regular rhythm  Heart sounds: Normal heart sounds  No murmur heard  Pulmonary:      Effort: Pulmonary effort is normal  No respiratory distress  Breath sounds: Normal breath sounds  No decreased air movement  No decreased breath sounds, wheezing, rhonchi or rales  Abdominal:      General: Abdomen is flat  Bowel sounds are normal       Palpations: Abdomen is soft  Musculoskeletal:         General: Normal range of motion  Cervical back: Normal range of motion  Lymphadenopathy:      Cervical: No cervical adenopathy  Skin:     General: Skin is warm  Findings: No rash  Neurological:      Mental Status: She is alert and oriented to person, place, and time  Motor: Motor function is intact     Psychiatric:         Attention and Perception: Attention normal          Mood and Affect: Mood normal

## 2023-05-31 ENCOUNTER — TELEPHONE (OUTPATIENT)
Dept: FAMILY MEDICINE CLINIC | Facility: CLINIC | Age: 38
End: 2023-05-31

## 2023-05-31 PROBLEM — B01.9 VARICELLA WITHOUT COMPLICATION: Status: RESOLVED | Noted: 2021-12-02 | Resolved: 2023-05-31

## 2023-05-31 PROBLEM — B01.9 VARICELLA WITHOUT COMPLICATION: Status: ACTIVE | Noted: 2021-12-02

## 2023-05-31 LAB — BACTERIA THROAT CULT: NORMAL

## 2023-05-31 NOTE — TELEPHONE ENCOUNTER
Pt went to urgent care    sore throat & ear pain  Alexsander Ko She was prescribed ABX which will finish tomorrow, still has white spots and red sore throat    she is negative for strep and is asking what  recommends

## 2023-05-31 NOTE — TELEPHONE ENCOUNTER
Would recommend follow up in the office for re-evaluation, can use same day with any provider available since Petey out of the office

## 2023-06-01 ENCOUNTER — OFFICE VISIT (OUTPATIENT)
Dept: FAMILY MEDICINE CLINIC | Facility: CLINIC | Age: 38
End: 2023-06-01

## 2023-06-01 VITALS
HEIGHT: 62 IN | OXYGEN SATURATION: 100 % | DIASTOLIC BLOOD PRESSURE: 84 MMHG | HEART RATE: 79 BPM | WEIGHT: 227 LBS | TEMPERATURE: 97.8 F | SYSTOLIC BLOOD PRESSURE: 118 MMHG | BODY MASS INDEX: 41.77 KG/M2

## 2023-06-01 DIAGNOSIS — H92.01 RIGHT EAR PAIN: ICD-10-CM

## 2023-06-01 DIAGNOSIS — J02.9 SORE THROAT: Primary | ICD-10-CM

## 2023-06-01 RX ORDER — METHYLPREDNISOLONE 4 MG/1
TABLET ORAL
Qty: 21 EACH | Refills: 0 | Status: SHIPPED | OUTPATIENT
Start: 2023-06-01

## 2023-06-01 NOTE — PROGRESS NOTES
Kay Willett 1985 female MRN: [id number]      ASSESSMENT/PLAN  Problem List Items Addressed This Visit    None  Visit Diagnoses     Sore throat    -  Primary    Relevant Medications    methylPREDNISolone 4 MG tablet therapy pack    Right ear pain        Relevant Medications    methylPREDNISolone 4 MG tablet therapy pack        No evidence of otitis on exam, though does have residual effusion  Mild erythema in oropharynx  Will give Medrol pack to calm inflammation of throat and allow eustachian tube to drain -- reviewed possible ADRs including palpitations, insomnia, increased appetite, mood fluctuations  To call if symptoms persist/worsen  No future appointments  SUBJECTIVE  CC: Follow-up, Sore Throat, and Earache      HPI:  Kay Willett is a 40 y o  female who presents with son for Urgent Care follow up  Urgent Care 5/28 -- R ear pain, post-nasal drip, sore throat  Rapid strep/Throat Cx (-)   Given Zpack     Today:   Continues to have R sided ear pain and sore throat and the glands on the R side are painful as well   No fever, nasal congestion/rhinorrhea, minimal cough, no GI upset   Taking Tylenol, allergy medication, doing salt water gargles  Her son is sick as well     Review of Systems   Constitutional: Negative for fever  HENT: Positive for ear pain and sore throat  Negative for congestion and rhinorrhea  Respiratory: Negative for cough and shortness of breath  Gastrointestinal: Negative for abdominal pain, diarrhea and vomiting  Musculoskeletal: Negative for myalgias  Neurological: Negative for headaches  Hematological: Positive for adenopathy         Historical Information   The patient history was reviewed and updated as follows:    Past Medical History:   Diagnosis Date   • Elevated cholesterol    • Gestational diabetes mellitus, antepartum 2/13/2015   • Varicella without complication 34/8/3058    Formatting of this note might be different from the original  "Formatting of this note might be different from the original  Age 9     Past Surgical History:   Procedure Laterality Date   •  SECTION     • DILATION AND CURETTAGE OF UTERUS       Family History   Problem Relation Age of Onset   • Diabetes Mother       Social History   Social History     Substance and Sexual Activity   Alcohol Use Never     Social History     Substance and Sexual Activity   Drug Use Never     Social History     Tobacco Use   Smoking Status Never   Smokeless Tobacco Never       Medications:     Current Outpatient Medications:   •  methylPREDNISolone 4 MG tablet therapy pack, Use as directed on package, Disp: 21 each, Rfl: 0  •  albuterol (Ventolin HFA) 90 mcg/act inhaler, Inhale 2 puffs every 6 (six) hours as needed for wheezing or shortness of breath, Disp: 18 g, Rfl: 0  •  Ashlyna 0 15-0 03 &0 01 MG TABS, Take 1 tablet by mouth daily, Disp: , Rfl:   Allergies   Allergen Reactions   • Penicillins      Family hx of severe reaction - never taken   • Zithromax [Azithromycin] GI Intolerance   • Clindamycin Rash       OBJECTIVE    Vitals:   Vitals:    23 1135   BP: 118/84   BP Location: Left arm   Patient Position: Sitting   Cuff Size: Large   Pulse: 79   Temp: 97 8 °F (36 6 °C)   SpO2: 100%   Weight: 103 kg (227 lb)   Height: 5' 2\" (1 575 m)           Physical Exam  Vitals and nursing note reviewed  Constitutional:       General: She is not in acute distress  Appearance: Normal appearance  HENT:      Head: Normocephalic and atraumatic  Right Ear: Ear canal and external ear normal       Left Ear: Tympanic membrane, ear canal and external ear normal       Ears:      Comments: R TM dull with serous effusion behind     Nose: Nose normal       Mouth/Throat:      Mouth: Mucous membranes are moist       Pharynx: Posterior oropharyngeal erythema present  No oropharyngeal exudate     Eyes:      Conjunctiva/sclera: Conjunctivae normal    Cardiovascular:      Rate and Rhythm: Normal rate " and regular rhythm  Pulmonary:      Effort: Pulmonary effort is normal  No respiratory distress  Breath sounds: Normal breath sounds  Lymphadenopathy:      Cervical: No cervical adenopathy  Skin:     General: Skin is warm and dry  Neurological:      General: No focal deficit present  Mental Status: She is alert     Psychiatric:         Mood and Affect: Mood normal                     DO Michelle Ahumada [address] [facility]   6/1/2023  11:53 AM

## 2023-06-05 ENCOUNTER — TELEPHONE (OUTPATIENT)
Dept: FAMILY MEDICINE CLINIC | Facility: CLINIC | Age: 38
End: 2023-06-05

## 2023-06-05 NOTE — TELEPHONE ENCOUNTER
Patient returned call and knows that she has had Levaquin before and not sure of other antibiotic she has taken before  None of the antibiotics questioned are familiar with patient

## 2023-06-05 NOTE — TELEPHONE ENCOUNTER
Pt is getting worse-still has sore throat with white spots all over it- down throat and at roof of mouth  Thrushor strept? Pt said there spots not a coating all over inside of mouth  Doesn't know what it is  Had ov here and also at care now  Prednisone isnt helping   Can you erx meds to ra/angeli George come in for appt today - son has a dermatology appt she waited over 2 months for     Call pt

## 2023-06-05 NOTE — TELEPHONE ENCOUNTER
I can send in another antibiotic as well as treatment for thrush to cover for both -- she has a penicillin allergy listed, but has she taken cephalosporins such as Keflex or Cefdinir before?

## 2023-06-06 DIAGNOSIS — K13.70 MOUTH LESION: ICD-10-CM

## 2023-06-06 DIAGNOSIS — J02.9 SORE THROAT: Primary | ICD-10-CM

## 2023-06-06 RX ORDER — CLOTRIMAZOLE 10 MG/1
10 LOZENGE ORAL; TOPICAL
Qty: 35 TABLET | Refills: 0 | Status: SHIPPED | OUTPATIENT
Start: 2023-06-06 | End: 2023-06-13

## 2023-06-06 RX ORDER — AZITHROMYCIN 250 MG/1
TABLET, FILM COATED ORAL
Qty: 6 TABLET | Refills: 0 | Status: SHIPPED | OUTPATIENT
Start: 2023-06-06 | End: 2023-06-11

## 2023-06-06 NOTE — TELEPHONE ENCOUNTER
Sent in script for clotrimazole troches for possible thrush  Levaquin is not a great coverage for possible strep  Since she has allergies listed to both penicillin and clindamycin, I am going to send in another script for Azithromycin since she tolerated this previously   If her symptoms do not improve in the next 2-3 days she needs to follow up in the office

## 2023-09-11 ENCOUNTER — OFFICE VISIT (OUTPATIENT)
Dept: FAMILY MEDICINE CLINIC | Facility: CLINIC | Age: 38
End: 2023-09-11
Payer: COMMERCIAL

## 2023-09-11 VITALS
WEIGHT: 224 LBS | SYSTOLIC BLOOD PRESSURE: 132 MMHG | TEMPERATURE: 97.1 F | OXYGEN SATURATION: 99 % | DIASTOLIC BLOOD PRESSURE: 82 MMHG | BODY MASS INDEX: 41.22 KG/M2 | HEIGHT: 62 IN | HEART RATE: 91 BPM

## 2023-09-11 DIAGNOSIS — B96.89 ACUTE BACTERIAL RHINOSINUSITIS: ICD-10-CM

## 2023-09-11 DIAGNOSIS — R09.81 NASAL CONGESTION: Primary | ICD-10-CM

## 2023-09-11 DIAGNOSIS — J01.90 ACUTE BACTERIAL RHINOSINUSITIS: ICD-10-CM

## 2023-09-11 LAB
SARS-COV-2 AG UPPER RESP QL IA: NEGATIVE
VALID CONTROL: NORMAL

## 2023-09-11 PROCEDURE — 87811 SARS-COV-2 COVID19 W/OPTIC: CPT

## 2023-09-11 PROCEDURE — 99213 OFFICE O/P EST LOW 20 MIN: CPT

## 2023-09-11 RX ORDER — LEVONORGESTREL AND ETHINYL ESTRADIOL 0.15-0.03
1 KIT ORAL DAILY
COMMUNITY
Start: 2023-09-06

## 2023-09-11 RX ORDER — DOXYCYCLINE HYCLATE 100 MG/1
100 CAPSULE ORAL EVERY 12 HOURS SCHEDULED
Qty: 14 CAPSULE | Refills: 0 | Status: SHIPPED | OUTPATIENT
Start: 2023-09-11 | End: 2023-09-18

## 2023-09-11 NOTE — PROGRESS NOTES
Name: Gavi Delaney      : 1985      MRN: [id number]  Encounter Provider: Eduardo Hernandez PA-C  Encounter Date: 2023   Encounter department: [address] [place] [address]     1. Nasal congestion  -     POCT Rapid Covid Ag    2. Acute bacterial rhinosinusitis  -     doxycycline hyclate (VIBRAMYCIN) 100 mg capsule; Take 1 capsule (100 mg total) by mouth every 12 (twelve) hours for 7 days       Patient presents with 8-9 days of sinus pressure/pain and congestion. POCT Covid-19 test performed in office today, negative. Patient trying OTC remedies with no relief, feels symptoms are worsening instead of improving. Physical exam revealed significant congestion, post-nasal drip, and maxillary sinus pain/pressure. Treating patient for acute bacterial rhinosinusitis. Allergic to penicillins - gave 7 day course of doxycycline. Patient states that she has taken doxycycline before with no issues. Encouraged supportive care - maintain adequate hydration and get lots of rest.        Subjective      Patient presents for evaluation of symptoms that started 8-9 days ago. Symptoms consist of sinus pressure and pain, congestion, right ear pain, mild cough, and headaches. She has tried taking Dayquil, claritin, and tylenol but states that nothing has helped. She feels like her symptoms have been getting worse the last few days and she has not noticed any improvement. Review of Systems   Constitutional: Positive for diaphoresis and fatigue. Negative for chills and fever. HENT: Positive for congestion, postnasal drip, rhinorrhea, sinus pressure and sinus pain. Negative for sore throat. Respiratory: Positive for cough (intermittent). Negative for chest tightness and shortness of breath. Cardiovascular: Positive for palpitations. Negative for chest pain and leg swelling. Gastrointestinal: Negative for abdominal pain, constipation, diarrhea, nausea and vomiting.    Genitourinary: Negative for difficulty urinating. Musculoskeletal: Negative for myalgias. Neurological: Positive for headaches. Negative for dizziness and light-headedness. Current Outpatient Medications on File Prior to Visit   Medication Sig   • albuterol (Ventolin HFA) 90 mcg/act inhaler Inhale 2 puffs every 6 (six) hours as needed for wheezing or shortness of breath   • levonorgestrel-ethinyl estradiol (NORDETTE) 0.15-30 MG-MCG per tablet Take 1 tablet by mouth daily   • [DISCONTINUED] Ashlyna 0.15-0.03 &0.01 MG TABS Take 1 tablet by mouth daily   • [DISCONTINUED] methylPREDNISolone 4 MG tablet therapy pack Use as directed on package       Objective     /82   Pulse 91   Temp (!) 97.1 °F (36.2 °C)   Ht 5' 2" (1.575 m)   Wt 102 kg (224 lb)   SpO2 99%   BMI 40.97 kg/m²     Physical Exam  Constitutional:       General: She is not in acute distress. Appearance: Normal appearance. She is not ill-appearing or diaphoretic. HENT:      Head: Normocephalic. Right Ear: Tympanic membrane, ear canal and external ear normal. There is no impacted cerumen. Left Ear: Tympanic membrane, ear canal and external ear normal. There is no impacted cerumen. Nose: Congestion and rhinorrhea present. Right Sinus: Maxillary sinus tenderness present. Left Sinus: Maxillary sinus tenderness present. Mouth/Throat:      Mouth: Mucous membranes are moist.      Pharynx: Posterior oropharyngeal erythema present. Eyes:      General:         Right eye: No discharge. Left eye: No discharge. Cardiovascular:      Rate and Rhythm: Normal rate and regular rhythm. Pulses: Normal pulses. Heart sounds: Normal heart sounds. No murmur heard. Pulmonary:      Effort: Pulmonary effort is normal.      Breath sounds: Normal breath sounds. No wheezing, rhonchi or rales. Musculoskeletal:      Cervical back: Normal range of motion. Right lower leg: No edema. Left lower leg: No edema. Lymphadenopathy:      Cervical: No cervical adenopathy. Skin:     General: Skin is warm. Neurological:      Mental Status: She is alert and oriented to person, place, and time.    Psychiatric:         Mood and Affect: Mood normal.       Rosy Spears PA-C

## 2023-12-11 ENCOUNTER — OFFICE VISIT (OUTPATIENT)
Dept: FAMILY MEDICINE CLINIC | Facility: CLINIC | Age: 38
End: 2023-12-11
Payer: COMMERCIAL

## 2023-12-11 VITALS
OXYGEN SATURATION: 99 % | TEMPERATURE: 97.5 F | SYSTOLIC BLOOD PRESSURE: 132 MMHG | HEART RATE: 110 BPM | HEIGHT: 62 IN | DIASTOLIC BLOOD PRESSURE: 84 MMHG | BODY MASS INDEX: 41.04 KG/M2 | WEIGHT: 223 LBS

## 2023-12-11 DIAGNOSIS — H92.01 RIGHT EAR PAIN: ICD-10-CM

## 2023-12-11 DIAGNOSIS — J06.9 UPPER RESPIRATORY TRACT INFECTION, UNSPECIFIED TYPE: Primary | ICD-10-CM

## 2023-12-11 PROCEDURE — 99213 OFFICE O/P EST LOW 20 MIN: CPT

## 2023-12-11 RX ORDER — AZITHROMYCIN 250 MG/1
TABLET, FILM COATED ORAL
Qty: 6 TABLET | Refills: 0 | Status: SHIPPED | OUTPATIENT
Start: 2023-12-11 | End: 2023-12-15

## 2023-12-11 NOTE — PROGRESS NOTES
Name: Anna Davalos      : 1985      MRN: [id number]  Encounter Provider: Santosh Baugh PA-C  Encounter Date: 2023   Encounter department: [address] [place] [address]     1. Upper respiratory tract infection, unspecified type  -     azithromycin (ZITHROMAX) 250 mg tablet; Take 2 tablets today then 1 tablet daily x 4 days    2. Right ear pain  -     azithromycin (ZITHROMAX) 250 mg tablet; Take 2 tablets today then 1 tablet daily x 4 days    Patient presents for evaluation of URI symptoms x 2 weeks. Declines covid and flu testing at today's visit. Physical exam revealed tenderness to palpation of bilateral maxillary sinuses but was otherwise unremarkable. Based on timeline of symptoms and exam findings treating with azithromycin (patient has listed allergy however she notes it is not a real allergy just makes her nauseous; on review she took it this past May from [facility] which she notes she tolerated well with no issues; therefore prescribing). Otherwise continue supportive care, flonase, and maintain hydration. To call if symptoms worsen or persist.     Advised ER if chest pain, sob, or trouble breathing. Subjective      CC: cough, ear pain     Patient presents for evaluation of cough, and wheezing and ear pain x 2 weeks. Notes throat started hurting and she developed sinus congestion about two days ago. She has been taking otc cold medicine and her inhaler which helps. Eating and drinking without issue. Review of Systems   Constitutional:  Negative for appetite change, chills, diaphoresis, fatigue and fever. HENT:  Positive for congestion, ear pain, rhinorrhea, sinus pressure, sinus pain and sore throat. Respiratory:  Positive for cough and wheezing. Negative for chest tightness and shortness of breath. Cardiovascular:  Negative for chest pain and palpitations.    Gastrointestinal:  Negative for abdominal pain, blood in stool, constipation, diarrhea, nausea and vomiting. Genitourinary:  Negative for decreased urine volume and difficulty urinating. Neurological:  Negative for dizziness, light-headedness and headaches. Current Outpatient Medications on File Prior to Visit   Medication Sig    albuterol (Ventolin HFA) 90 mcg/act inhaler Inhale 2 puffs every 6 (six) hours as needed for wheezing or shortness of breath    levonorgestrel-ethinyl estradiol (NORDETTE) 0.15-30 MG-MCG per tablet Take 1 tablet by mouth daily       Objective     /84   Pulse (!) 110   Temp 97.5 °F (36.4 °C)   Ht 5' 2" (1.575 m)   Wt 101 kg (223 lb)   SpO2 99%   BMI 40.79 kg/m²     Physical Exam  Vitals reviewed. Constitutional:       General: She is not in acute distress. Appearance: Normal appearance. She is not ill-appearing or diaphoretic. HENT:      Head: Normocephalic and atraumatic. Right Ear: Tympanic membrane, ear canal and external ear normal. There is no impacted cerumen. Left Ear: Tympanic membrane, ear canal and external ear normal. There is no impacted cerumen. Nose: Congestion present. No rhinorrhea. Right Sinus: Maxillary sinus tenderness present. No frontal sinus tenderness. Left Sinus: Maxillary sinus tenderness present. No frontal sinus tenderness. Mouth/Throat:      Mouth: Mucous membranes are moist.      Pharynx: Oropharynx is clear. Posterior oropharyngeal erythema present. No oropharyngeal exudate. Eyes:      General:         Right eye: No discharge. Left eye: No discharge. Conjunctiva/sclera: Conjunctivae normal.   Cardiovascular:      Rate and Rhythm: Normal rate and regular rhythm. Pulses: Normal pulses. Heart sounds: Normal heart sounds. No murmur heard. Pulmonary:      Effort: Pulmonary effort is normal. No respiratory distress. Breath sounds: Normal breath sounds. No wheezing, rhonchi or rales. Musculoskeletal:         General: Normal range of motion.       Cervical back: Normal range of motion and neck supple. Lymphadenopathy:      Cervical: Cervical adenopathy present. Skin:     General: Skin is warm. Neurological:      General: No focal deficit present. Mental Status: She is alert.    Psychiatric:         Mood and Affect: Mood normal.       Jac Spurling, PA-C

## 2024-01-01 ENCOUNTER — OFFICE VISIT (OUTPATIENT)
Dept: URGENT CARE | Facility: CLINIC | Age: 39
End: 2024-01-01
Payer: COMMERCIAL

## 2024-01-01 VITALS
HEART RATE: 90 BPM | TEMPERATURE: 97.9 F | OXYGEN SATURATION: 99 % | SYSTOLIC BLOOD PRESSURE: 137 MMHG | WEIGHT: 232.8 LBS | DIASTOLIC BLOOD PRESSURE: 86 MMHG | HEIGHT: 62 IN | BODY MASS INDEX: 42.84 KG/M2

## 2024-01-01 DIAGNOSIS — J01.01 ACUTE RECURRENT MAXILLARY SINUSITIS: Primary | ICD-10-CM

## 2024-01-01 PROCEDURE — S9088 SERVICES PROVIDED IN URGENT: HCPCS

## 2024-01-01 PROCEDURE — 99213 OFFICE O/P EST LOW 20 MIN: CPT

## 2024-01-01 RX ORDER — PREDNISONE 20 MG/1
40 TABLET ORAL DAILY
Qty: 14 TABLET | Refills: 0 | Status: SHIPPED | OUTPATIENT
Start: 2024-01-01 | End: 2024-01-08

## 2024-01-01 RX ORDER — DOXYCYCLINE 100 MG/1
100 TABLET ORAL 2 TIMES DAILY
Qty: 14 TABLET | Refills: 0 | Status: SHIPPED | OUTPATIENT
Start: 2024-01-01 | End: 2024-01-08

## 2024-01-01 NOTE — PATIENT INSTRUCTIONS
Take antibiotics as prescribed.  Prednisone as directed.  Flonase nasal spray.  Over the counter saline nasal spray  Sinus rinses mixed with distilled water.    Consider follow up with ENT for further evaluation of re-occurring sinusitis.   Follow up with PCP in 3-5 days.  Proceed to  ER if symptoms worsen.       Sinusitis   AMBULATORY CARE:   Sinusitis  is inflammation or infection of your sinuses. Sinusitis is most often caused by a virus. Acute sinusitis may last up to 12 weeks. Chronic sinusitis lasts longer than 12 weeks. Recurrent sinusitis means you have 4 or more infections in 1 year.        Common signs and symptoms:   Fever    Pain, pressure, redness, or swelling around the forehead, cheeks, or eyes    Thick yellow or green discharge from your nose    Tenderness when you touch your face over your sinuses    Dry cough that happens mostly at night or when you lie down    Headache and face pain that is worse when you lean forward    Tooth pain, or pain when you chew    Seek care immediately if:   You have trouble breathing or wheezing that is getting worse.    You have a stiff neck, a fever, or a bad headache.     You cannot open your eye.     Your eyeball bulges out or you cannot move your eye.     You are more sleepy than normal, or you notice changes in your ability to think, move, or talk.    You have swelling of your forehead or scalp.    Call your doctor if:   You have vision changes, such as double vision.    Your eye and eyelid are red, swollen, and painful.     Your symptoms do not improve or go away after 10 days.    You have nausea and are vomiting.    Your nose is bleeding.    You have questions or concerns about your condition or care.    Medicines:  Your symptoms may go away on their own. Your healthcare provider may recommend watchful waiting for up to 10 days before starting antibiotics. You may need any of the following:  Acetaminophen  decreases pain and fever. It is available without a  doctor's order. Ask how much to take and how often to take it. Follow directions. Read the labels of all other medicines you are using to see if they also contain acetaminophen, or ask your doctor or pharmacist. Acetaminophen can cause liver damage if not taken correctly. Do not use more than 4 grams (4,000 milligrams) total of acetaminophen in one day.     NSAIDs , such as ibuprofen, help decrease swelling, pain, and fever. This medicine is available with or without a doctor's order. NSAIDs can cause stomach bleeding or kidney problems in certain people. If you take blood thinner medicine, always ask your healthcare provider if NSAIDs are safe for you. Always read the medicine label and follow directions.    Nasal steroid sprays  may help decrease inflammation in your nose and sinuses.    Decongestants  help reduce swelling and drain mucus in the nose and sinuses. They may help you breathe easier.     Antihistamines  help dry mucus in the nose and relieve sneezing.     Antibiotics  help treat or prevent a bacterial infection.    Self-care:   Rinse your sinuses as directed.  Use a sinus rinse device to rinse your nasal passages with a saline (salt water) solution or distilled water. Do not use tap water. This will help thin the mucus in your nose and rinse away pollen and dirt. It will also help reduce swelling so you can breathe normally.    Use a humidifier  to increase air moisture in your home. This may make it easier for you to breathe and help decrease your cough.     Sleep with your head elevated.  Place an extra pillow under your head before you go to sleep to help your sinuses drain.     Drink liquids as directed.  Ask your healthcare provider how much liquid to drink each day and which liquids are best for you. Liquids will thin the mucus in your nose and help it drain. Avoid drinks that contain alcohol or caffeine.     Do not smoke, and avoid secondhand smoke.  Nicotine and other chemicals in cigarettes  and cigars can make your symptoms worse. Ask your healthcare provider for information if you currently smoke and need help to quit. E-cigarettes or smokeless tobacco still contain nicotine. Talk to your healthcare provider before you use these products.    Prevent the spread of germs:   Wash your hands often with soap and water.  Wash your hands after you use the bathroom, change a child's diaper, or sneeze. Wash your hands before you prepare or eat food.         Stay away from people who are sick.  Some germs spread easily and quickly through contact.    Follow up with your doctor as directed:  You may be referred to an ear, nose, and throat specialist. Write down your questions so you remember to ask them during your visits.   © Copyright [employer] 2022 Information is for End User's use only and may not be sold, redistributed or otherwise used for commercial purposes. All illustrations and images included in CareNotes® are the copyrighted property of [employer]. or [employer]  The above information is an  only. It is not intended as medical advice for individual conditions or treatments. Talk to your doctor, nurse or pharmacist before following any medical regimen to see if it is safe and effective for you.         Yes

## 2024-01-01 NOTE — PROGRESS NOTES
[facility]        NAME: Harriet Morales is a 38 y.o. female  : 1985    MRN: [id number]  DATE: 2024  TIME: 12:59 PM    Assessment and Plan   Acute recurrent maxillary sinusitis [J01.01]  1. Acute recurrent maxillary sinusitis  doxycycline (ADOXA) 100 MG tablet    predniSONE 20 mg tablet            Patient Instructions     Take antibiotics as prescribed.  Prednisone as directed.  Flonase nasal spray.  Over the counter saline nasal spray  Sinus rinses mixed with distilled water.    Consider follow up with ENT for further evaluation of re-occurring sinusitis.   Follow up with PCP in 3-5 days.  Proceed to  ER if symptoms worsen.    Chief Complaint     Chief Complaint   Patient presents with    Earache     Started about a month ago, on/off. Patient has been on a antibiotic which was prescribe by family practice, helped out. Unfortunately her antibiotic ran out. Right side of her face hurts, along the nasal cavity.    Nasal Congestion     Right side of face, dealing with it for a month on/off. Patient has tried Flonase, and also the antibiotic which was prescribe by family practice. Has been two weeks since the medication had run out. Slowly the congestion and ear pain to the right side of face has come back.         History of Present Illness       The patient presents today with complaints of R sided facial pain/pressure, R ear fullness, swollen glands on the R side that has been intermittent for about 1 month. She was seen by her PCP and put on a zpack for sinusitis. She states her symptoms improved while on the antibiotic, but returned shortly after finishing the course. She has also been using flonase. She has a history of seasonal allergies that worsens in the fall/winter months. Denies fever/chills. Was told by her PCP if the zpack did not help she may need a different antibiotic course, however the office was closed today.         Review of Systems   Review of Systems   Constitutional:   Negative for chills and fever.   HENT:  Positive for congestion, ear pain (R ear), postnasal drip, sinus pressure and sinus pain. Negative for rhinorrhea and sore throat.    Respiratory:  Negative for cough.    Musculoskeletal:  Negative for myalgias.   Skin:  Negative for rash.   Hematological:  Positive for adenopathy.         Current Medications       Current Outpatient Medications:     albuterol (Ventolin HFA) 90 mcg/act inhaler, Inhale 2 puffs every 6 (six) hours as needed for wheezing or shortness of breath, Disp: 18 g, Rfl: 0    doxycycline (ADOXA) 100 MG tablet, Take 1 tablet (100 mg total) by mouth 2 (two) times a day for 7 days, Disp: 14 tablet, Rfl: 0    levonorgestrel-ethinyl estradiol (NORDETTE) 0.15-30 MG-MCG per tablet, Take 1 tablet by mouth daily, Disp: , Rfl:     predniSONE 20 mg tablet, Take 2 tablets (40 mg total) by mouth daily for 7 days, Disp: 14 tablet, Rfl: 0    Current Allergies     Allergies as of 2024 - Reviewed 2024   Allergen Reaction Noted    Penicillins  2017    Zithromax [azithromycin] GI Intolerance 2017    Clindamycin Rash 2017            The following portions of the patient's history were reviewed and updated as appropriate: allergies, current medications, past family history, past medical history, past social history, past surgical history and problem list.     Past Medical History:   Diagnosis Date    Elevated cholesterol     Gestational diabetes mellitus, antepartum 2015    Varicella without complication 2021    Formatting of this note might be different from the original. Formatting of this note might be different from the original. Age 9       Past Surgical History:   Procedure Laterality Date     SECTION      DILATION AND CURETTAGE OF UTERUS         Family History   Problem Relation Age of Onset    Diabetes Mother          Medications have been verified.        Objective   /86   Pulse 90   Temp 97.9 °F (36.6 °C)   Ht  "5' 2\" (1.575 m)   Wt 106 kg (232 lb 12.8 oz)   SpO2 99%   BMI 42.58 kg/m²        Physical Exam     Physical Exam  Vitals and nursing note reviewed.   Constitutional:       General: She is not in acute distress.     Appearance: Normal appearance. She is not ill-appearing.   HENT:      Head: Normocephalic and atraumatic.      Right Ear: Tympanic membrane, ear canal and external ear normal.      Left Ear: Tympanic membrane, ear canal and external ear normal.      Nose: Congestion present. No rhinorrhea.      Right Sinus: Maxillary sinus tenderness present.      Left Sinus: No maxillary sinus tenderness.      Mouth/Throat:      Lips: Pink.      Mouth: Mucous membranes are moist.      Pharynx: No oropharyngeal exudate or posterior oropharyngeal erythema.      Tonsils: No tonsillar exudate.   Eyes:      General: Vision grossly intact.      Extraocular Movements: Extraocular movements intact.      Pupils: Pupils are equal, round, and reactive to light.   Cardiovascular:      Rate and Rhythm: Normal rate and regular rhythm.      Heart sounds: Normal heart sounds. No murmur heard.  Pulmonary:      Effort: Pulmonary effort is normal. No respiratory distress.      Breath sounds: Normal breath sounds. No decreased air movement. No decreased breath sounds, wheezing, rhonchi or rales.   Musculoskeletal:         General: Normal range of motion.      Cervical back: Normal range of motion.   Lymphadenopathy:      Cervical: No cervical adenopathy.   Skin:     General: Skin is warm.      Findings: No rash.   Neurological:      Mental Status: She is alert and oriented to person, place, and time.      Motor: Motor function is intact.      Gait: Gait is intact.   Psychiatric:         Attention and Perception: Attention normal.         Mood and Affect: Mood normal.                   "

## 2024-01-26 DIAGNOSIS — R05.9 COUGH: ICD-10-CM

## 2024-01-26 RX ORDER — ALBUTEROL SULFATE 90 UG/1
2 AEROSOL, METERED RESPIRATORY (INHALATION) EVERY 6 HOURS PRN
Qty: 18 G | Refills: 0 | Status: SHIPPED | OUTPATIENT
Start: 2024-01-26

## 2024-02-16 ENCOUNTER — TELEMEDICINE (OUTPATIENT)
Dept: FAMILY MEDICINE CLINIC | Facility: CLINIC | Age: 39
End: 2024-02-16
Payer: COMMERCIAL

## 2024-02-16 DIAGNOSIS — J01.01 ACUTE RECURRENT MAXILLARY SINUSITIS: Primary | ICD-10-CM

## 2024-02-16 PROCEDURE — 99213 OFFICE O/P EST LOW 20 MIN: CPT

## 2024-02-16 RX ORDER — DOXYCYCLINE HYCLATE 100 MG/1
100 CAPSULE ORAL EVERY 12 HOURS SCHEDULED
Qty: 14 CAPSULE | Refills: 0 | Status: SHIPPED | OUTPATIENT
Start: 2024-02-16 | End: 2024-02-23

## 2024-02-16 RX ORDER — DESOGESTREL AND ETHINYL ESTRADIOL 0.15-0.03
1 KIT ORAL DAILY
COMMUNITY
Start: 2024-01-27

## 2024-02-16 RX ORDER — PREDNISONE 20 MG/1
20 TABLET ORAL DAILY
Qty: 5 TABLET | Refills: 0 | Status: SHIPPED | OUTPATIENT
Start: 2024-02-16 | End: 2024-02-21

## 2024-02-16 NOTE — PROGRESS NOTES
Virtual Regular Visit    Verification of patient location:    Patient is located at Home in the following state in which I hold an active license [place]      Assessment/Plan:    Problem List Items Addressed This Visit    None  Visit Diagnoses       Acute recurrent maxillary sinusitis    -  Primary    Relevant Medications    doxycycline hyclate (VIBRAMYCIN) 100 mg capsule    predniSONE 20 mg tablet    Other Relevant Orders    Ambulatory Referral to Otolaryngology          Patient presents for evaluation of sinus pressure/pain, congestion, cough x 1 week. At home covid test negative. Patient has been getting recurrent sinus infections; therefore placed a referral to ENT for further evaluation. Otherwise based on timeline of symptoms will cover for sinusitis with doxycycline (allergic to PCN, azithromycin - educated on potential side effects of prescribed abx) and gave prednisone to decrease the inflammation in her sinuses (educated on side effects). Otherwise encouraged to continue supportive care and stay hydrated and advised to continue using flonase daily. If symptoms worsen/persist despite treatment recommended in person evaluation. If she develop any chest pain, sob, trouble breathing advised immediate ER.      Reason for visit is   Chief Complaint   Patient presents with    Nasal Congestion     Sinus congestion on the right side of the face, sharp ear pain on the right side as well as swollen glands on that side. Tested negative covid at home     Virtual Regular Visit     Encounter provider Shantel Cheng PA-C    Provider located at [facility]  [address]  [place] [address]      Recent Visits  No visits were found meeting these conditions.  Showing recent visits within past 7 days and meeting all other requirements  Today's Visits  Date Type Provider Dept   02/16/24 Telemedicine Shantel Cheng PA-C [facility]   Showing today's visits and meeting all  other requirements  Future Appointments  No visits were found meeting these conditions.  Showing future appointments within next 150 days and meeting all other requirements       The patient was identified by name and date of birth. Harriet Morales was informed that this is a telemedicine visit and that the visit is being conducted through the [email or web address] platform. She agrees to proceed..  My office door was closed. No one else was in the room.  She acknowledged consent and understanding of privacy and security of the video platform. The patient has agreed to participate and understands they can discontinue the visit at any time.    Patient is aware this is a billable service.     Subjective  Harriet Morales is a 38 y.o. female who presents via telemedicine for sinus symptoms .      CC: sinus pressure, sinus headache, right ear pain, cough x 1 week     Patient presents for evaluation of sinus pressure, sinus headache, right ear pain, and cough x 1 week. Yesterday notes her temp got to 99.3F but otherwise no fever. Pain is starting to radiate to her left upper teeth. Has been using flonase with very minimal relief. This is patient's fourth sinus infection in the last six months.          Past Medical History:   Diagnosis Date    Elevated cholesterol     Gestational diabetes mellitus, antepartum 2015    Varicella without complication 2021    Formatting of this note might be different from the original. Formatting of this note might be different from the original. Age 9       Past Surgical History:   Procedure Laterality Date     SECTION      DILATION AND CURETTAGE OF UTERUS         Current Outpatient Medications   Medication Sig Dispense Refill    albuterol (Ventolin HFA) 90 mcg/act inhaler Inhale 2 puffs every 6 (six) hours as needed for wheezing or shortness of breath 18 g 0    doxycycline hyclate (VIBRAMYCIN) 100 mg capsule Take 1 capsule (100 mg total) by mouth every 12 (twelve) hours for 7 days  14 capsule 0    Enskyce 0.15-30 MG-MCG per tablet Take 1 tablet by mouth daily      predniSONE 20 mg tablet Take 1 tablet (20 mg total) by mouth daily for 5 days 5 tablet 0    levonorgestrel-ethinyl estradiol (NORDETTE) 0.15-30 MG-MCG per tablet Take 1 tablet by mouth daily (Patient not taking: Reported on 2/16/2024)       No current facility-administered medications for this visit.        Allergies   Allergen Reactions    Penicillins      Family hx of severe reaction - never taken    Zithromax [Azithromycin] GI Intolerance    Clindamycin Rash       Review of Systems   Constitutional:  Negative for appetite change, chills, diaphoresis, fatigue and fever.   HENT:  Positive for congestion, ear pain, postnasal drip, rhinorrhea, sinus pressure, sinus pain and sore throat.    Respiratory:  Positive for cough. Negative for chest tightness, shortness of breath and wheezing.    Cardiovascular:  Negative for chest pain and palpitations.   Gastrointestinal:  Negative for abdominal pain, diarrhea, nausea and vomiting.   Neurological:  Positive for headaches (sinus headache).     Video Exam    There were no vitals filed for this visit.    Physical Exam  Vitals reviewed.   Constitutional:       General: She is not in acute distress.     Appearance: Normal appearance. She is not diaphoretic.   HENT:      Head: Normocephalic and atraumatic.      Right Ear: External ear normal.      Left Ear: External ear normal.      Nose: Congestion and rhinorrhea present.      Mouth/Throat:      Mouth: Mucous membranes are moist.      Pharynx: Oropharynx is clear.   Eyes:      General:         Right eye: No discharge.         Left eye: No discharge.      Conjunctiva/sclera: Conjunctivae normal.   Musculoskeletal:      Cervical back: Normal range of motion.   Neurological:      Mental Status: She is alert.        Visit Time  Total Visit Duration: 15 minutes

## 2024-02-29 ENCOUNTER — OFFICE VISIT (OUTPATIENT)
Dept: FAMILY MEDICINE CLINIC | Facility: CLINIC | Age: 39
End: 2024-02-29
Payer: COMMERCIAL

## 2024-02-29 VITALS
SYSTOLIC BLOOD PRESSURE: 143 MMHG | WEIGHT: 231 LBS | HEART RATE: 98 BPM | DIASTOLIC BLOOD PRESSURE: 73 MMHG | TEMPERATURE: 98.5 F | HEIGHT: 62 IN | OXYGEN SATURATION: 99 % | BODY MASS INDEX: 42.51 KG/M2

## 2024-02-29 DIAGNOSIS — H69.91 EUSTACHIAN TUBE DYSFUNCTION, RIGHT: ICD-10-CM

## 2024-02-29 DIAGNOSIS — J01.00 SUBACUTE MAXILLARY SINUSITIS: Primary | ICD-10-CM

## 2024-02-29 DIAGNOSIS — H92.01 RIGHT EAR PAIN: ICD-10-CM

## 2024-02-29 PROCEDURE — 99213 OFFICE O/P EST LOW 20 MIN: CPT | Performed by: FAMILY MEDICINE

## 2024-02-29 RX ORDER — FLUTICASONE PROPIONATE 50 MCG
1 SPRAY, SUSPENSION (ML) NASAL DAILY
Qty: 11.1 ML | Refills: 0 | Status: SHIPPED | OUTPATIENT
Start: 2024-02-29

## 2024-02-29 NOTE — PROGRESS NOTES
"Name: Harriet Morales      : 1985      MRN: [id number]  Encounter Provider: Jonnathan Garsia PA-C  Encounter Date: 2024   Encounter department: [facility]    Assessment & Plan     1. Subacute maxillary sinusitis  -     fluticasone (FLONASE) 50 mcg/act nasal spray; 1 spray into each nostril daily    2. Right ear pain  -     fluticasone (FLONASE) 50 mcg/act nasal spray; 1 spray into each nostril daily    3. Eustachian tube dysfunction, right    Exam today is unremarkable. Suspect eustachian dysfunction causing her ear pain and sinus congestion. She is scheduled to see ENT. In the meantime recommend flonase, mucinex, tylenol and plenty of fluids. Follow up prn. No acute infectious focus on exam today       Subjective     Pt presents with concerns of R ear pain. 3-4 days. Associated \"cloudy\" feeling over R maxillary sinus. Some lingering congestion for sinus infection tx with doxycycline and prednisone 2 weeks ago. No fevers. No SOB. No sore throat.       Review of Systems   Constitutional:  Negative for chills, fatigue and fever.   HENT:  Positive for ear pain and sinus pressure. Negative for congestion, hearing loss, nosebleeds, postnasal drip, rhinorrhea, sinus pain, sneezing and sore throat.    Eyes:  Negative for pain, discharge, itching and visual disturbance.   Respiratory:  Negative for cough, chest tightness, shortness of breath and wheezing.    Cardiovascular:  Negative for chest pain, palpitations and leg swelling.   Gastrointestinal:  Negative for abdominal pain, blood in stool, constipation, diarrhea, nausea and vomiting.   Genitourinary:  Negative for frequency and urgency.   Neurological:  Negative for dizziness, light-headedness and numbness.       Past Medical History:   Diagnosis Date   • Elevated cholesterol    • Gestational diabetes mellitus, antepartum 2015   • Varicella without complication 2021    Formatting of this note might be different from the " original. Formatting of this note might be different from the original. Age 9     Past Surgical History:   Procedure Laterality Date   •  SECTION     • DILATION AND CURETTAGE OF UTERUS       Family History   Problem Relation Age of Onset   • Diabetes Mother      Social History     Socioeconomic History   • Marital status: /Civil Union     Spouse name: None   • Number of children: None   • Years of education: None   • Highest education level: None   Occupational History   • None   Tobacco Use   • Smoking status: Never   • Smokeless tobacco: Never   Vaping Use   • Vaping status: Never Used   Substance and Sexual Activity   • Alcohol use: Never   • Drug use: Never   • Sexual activity: None   Other Topics Concern   • None   Social History Narrative   • None     Social Determinants of Health     Financial Resource Strain: Not on file   Food Insecurity: Not on file   Transportation Needs: Not on file   Physical Activity: Not on file   Stress: Not on file   Social Connections: Not on file   Intimate Partner Violence: Not on file   Housing Stability: Not on file     Current Outpatient Medications on File Prior to Visit   Medication Sig   • albuterol (Ventolin HFA) 90 mcg/act inhaler Inhale 2 puffs every 6 (six) hours as needed for wheezing or shortness of breath   • Enskyce 0.15-30 MG-MCG per tablet Take 1 tablet by mouth daily   • levonorgestrel-ethinyl estradiol (NORDETTE) 0.15-30 MG-MCG per tablet Take 1 tablet by mouth daily     Allergies   Allergen Reactions   • Penicillins      Family hx of severe reaction - never taken   • Zithromax [Azithromycin] GI Intolerance   • Clindamycin Rash     Immunization History   Administered Date(s) Administered   • DTaP 1985, 1987, 1987, 1989, 1991   • Hep B, Adolescent or Pediatric 2003, 2003, 2004   • IPV 1985, 1987, 1989, 1991   • MMR 03/15/1988, 2005   • Pneumococcal Conjugate 13-Valent  "07/09/2021   • Td (adult), adsorbed 06/30/2004   • Tdap 03/30/2015, 10/27/2020   • Tuberculin Skin Test-PPD Intradermal 01/26/2017       Objective     /73   Pulse 98   Temp 98.5 °F (36.9 °C)   Ht 5' 2\" (1.575 m)   Wt 105 kg (231 lb)   SpO2 99%   BMI 42.25 kg/m²     Physical Exam  Vitals and nursing note reviewed.   Constitutional:       General: She is not in acute distress.     Appearance: Normal appearance.   HENT:      Head: Normocephalic and atraumatic.      Right Ear: Tympanic membrane normal.      Left Ear: Tympanic membrane normal.      Nose: Congestion present.      Mouth/Throat:      Mouth: Mucous membranes are moist.      Pharynx: Oropharynx is clear. No oropharyngeal exudate or posterior oropharyngeal erythema.   Eyes:      Pupils: Pupils are equal, round, and reactive to light.   Cardiovascular:      Rate and Rhythm: Normal rate and regular rhythm.      Heart sounds: Normal heart sounds. No murmur heard.  Pulmonary:      Effort: Pulmonary effort is normal. No respiratory distress.      Breath sounds: Normal breath sounds. No wheezing, rhonchi or rales.   Musculoskeletal:         General: Normal range of motion.      Cervical back: Normal range of motion and neck supple.   Skin:     General: Skin is warm and dry.   Neurological:      Mental Status: She is alert and oriented to person, place, and time.   Psychiatric:         Mood and Affect: Mood and affect normal.       Jonnathan Garsia PA-C    "

## 2024-05-27 ENCOUNTER — OFFICE VISIT (OUTPATIENT)
Dept: URGENT CARE | Facility: CLINIC | Age: 39
End: 2024-05-27
Payer: COMMERCIAL

## 2024-05-27 VITALS
WEIGHT: 231.13 LBS | HEART RATE: 91 BPM | TEMPERATURE: 97.6 F | RESPIRATION RATE: 18 BRPM | SYSTOLIC BLOOD PRESSURE: 144 MMHG | DIASTOLIC BLOOD PRESSURE: 82 MMHG | BODY MASS INDEX: 42.27 KG/M2 | OXYGEN SATURATION: 98 %

## 2024-05-27 DIAGNOSIS — L25.5 RHUS DERMATITIS: Primary | ICD-10-CM

## 2024-05-27 PROCEDURE — 99213 OFFICE O/P EST LOW 20 MIN: CPT

## 2024-05-27 PROCEDURE — S9088 SERVICES PROVIDED IN URGENT: HCPCS

## 2024-05-27 RX ORDER — PREDNISONE 20 MG/1
TABLET ORAL
Qty: 20 TABLET | Refills: 0 | Status: SHIPPED | OUTPATIENT
Start: 2024-05-27 | End: 2024-06-08

## 2024-05-27 NOTE — PROGRESS NOTES
[facility]        NAME: Harriet Morales is a 38 y.o. female  : 1985    MRN: [id number]  DATE: May 27, 2024  TIME: 8:20 AM    Assessment and Plan   Rhus dermatitis [L25.5]  1. Rhus dermatitis  predniSONE 20 mg tablet            Patient Instructions     Take prednisone as prescribed   Avoid scratching area  Topical benadryl cream, hydrocortisone cream, or calamine lotion during the day  Cool compresses  Continue Zyrtec or benadryl over the counter  Keep area clean and dry  Watch for signs of infection     Follow up with PCP in 3-5 days.  Proceed to  ER if symptoms worsen.    Chief Complaint     Chief Complaint   Patient presents with    Rash     Noted approx 2 days ago. Using benadryl cream. Started on left arm and spreading to back and arms. No difficulty breathing. Taking benadryl. No changes in diet, detergents and routine.          History of Present Illness       The patient presents today with complaints of a generalized itchy rash x 3 days. She has tried OTC oral benadryl, benadryl cream with minimal relief. States the rash has spread and her R eye felt swollen this morning when she woke up. Believes she came in contact with poison ivy. Denies erythema, warmth, swelling, pain, drainage.         Review of Systems   Review of Systems   Constitutional:  Negative for chills and fever.   HENT:  Negative for sore throat and trouble swallowing.    Respiratory:  Negative for cough, shortness of breath and wheezing.    Skin:  Positive for rash.         Current Medications       Current Outpatient Medications:     albuterol (Ventolin HFA) 90 mcg/act inhaler, Inhale 2 puffs every 6 (six) hours as needed for wheezing or shortness of breath, Disp: 18 g, Rfl: 0    Enskyce 0.15-30 MG-MCG per tablet, Take 1 tablet by mouth daily, Disp: , Rfl:     fluticasone (FLONASE) 50 mcg/act nasal spray, 1 spray into each nostril daily, Disp: 11.1 mL, Rfl: 0    levonorgestrel-ethinyl estradiol (NORDETTE) 0.15-30 MG-MCG  per tablet, Take 1 tablet by mouth daily, Disp: , Rfl:     predniSONE 20 mg tablet, Take 3 tablets (60 mg total) by mouth daily for 3 days, THEN 2 tablets (40 mg total) daily for 3 days, THEN 1 tablet (20 mg total) daily for 3 days, THEN 0.5 tablets (10 mg total) daily for 3 days., Disp: 20 tablet, Rfl: 0    Current Allergies     Allergies as of 2024 - Reviewed 2024   Allergen Reaction Noted    Penicillins  2017    Zithromax [azithromycin] GI Intolerance 2017    Clindamycin Rash 2017            The following portions of the patient's history were reviewed and updated as appropriate: allergies, current medications, past family history, past medical history, past social history, past surgical history and problem list.     Past Medical History:   Diagnosis Date    Elevated cholesterol     Gestational diabetes mellitus, antepartum 2015    Varicella without complication 2021    Formatting of this note might be different from the original. Formatting of this note might be different from the original. Age 9       Past Surgical History:   Procedure Laterality Date     SECTION      DILATION AND CURETTAGE OF UTERUS         Family History   Problem Relation Age of Onset    Diabetes Mother          Medications have been verified.        Objective   /82   Pulse 91   Temp 97.6 °F (36.4 °C)   Resp 18   Wt 105 kg (231 lb 2 oz)   SpO2 98%   BMI 42.27 kg/m²        Physical Exam     Physical Exam  Vitals and nursing note reviewed.   Constitutional:       General: She is not in acute distress.     Appearance: Normal appearance.   HENT:      Head: Normocephalic and atraumatic.      Right Ear: External ear normal.      Left Ear: External ear normal.      Mouth/Throat:      Mouth: Mucous membranes are moist.   Eyes:      Pupils: Pupils are equal, round, and reactive to light.   Cardiovascular:      Rate and Rhythm: Normal rate and regular rhythm.      Heart sounds: Normal heart  sounds. No murmur heard.  Pulmonary:      Effort: Pulmonary effort is normal. No respiratory distress.      Breath sounds: Normal breath sounds. No decreased air movement. No decreased breath sounds, wheezing, rhonchi or rales.   Skin:     General: Skin is warm and dry.      Findings: Rash present.      Comments: Generalized maculopapular rash to BL upper arms, back, and R side of face. No swelling, erythema, warmth, drainage noted.    Neurological:      Mental Status: She is alert and oriented to person, place, and time. Mental status is at baseline.   Psychiatric:         Mood and Affect: Mood normal.         Behavior: Behavior normal.

## 2024-05-27 NOTE — PATIENT INSTRUCTIONS
Take prednisone as prescribed   Avoid scratching area  Topical benadryl cream, hydrocortisone cream, or calamine lotion during the day  Cool compresses  Continue Zyrtec or benadryl over the counter  Keep area clean and dry  Watch for signs of infection     Follow up with PCP in 3-5 days.  Proceed to  ER if symptoms worsen.

## 2024-06-18 NOTE — ASSESSMENT & PLAN NOTE
Mid ear effusion, membrane is translucent, intact, non-erythematous and landmarks are clearly visible  There is no evidence of acute middle ear infection  Physical Therapy Visit    Visit Type: Daily Treatment Note  Visit: 14  Referring Provider: Lydia Conklin*  Medical Diagnosis (from order): S43.431A - Type 2 superior labrum extending from anterior to posterior (SLAP) lesion, right, initial encounter     SUBJECTIVE                                                                                                               Patient reports she is doing well. Restrictions are no overhead lifting, pushing and pulling 10/20 lb max.     Pain / Symptoms  - Pain rating (out of 10): Current: 0       OBJECTIVE                                                                                                                           Treatment     Therapeutic Exercise  Arm bike level 4 forward and retro x 3 mins each  PNF all diagonals red band pulling down, yellow pulling up x 30 each  Body blade: elbow 90 degree flexion, shoulder 90 degree flexion, shoulder overhead x 30 seconds each  AROM: shoulder flexion, scaption 3# x 15 each  Bicep curls 8# x 10  Crate lifting with 25# total + bicep curl    Not today:  Wall push up position with red band star pulls x 5 bilaterally  AROM shoulder ER to IR with 90 degrees abduction x 10 slack in yellow band  Rows with blue band 2 x 10  Shoulder IR blue band and ER with green band 2 x 10 each  Flexion to 90 degrees with Yellow band x 20  Forearm planks 2 x 15 seconds  Hand plank x 15 seconds  Table push ups 2 x 10    Skilled input: verbal instruction/cues    Writer verbally educated and received verbal consent for hand placement, positioning of patient, and techniques to be performed today from patient for clothing adjustments for techniques, hand placement and palpation for techniques and therapist position for techniques as described above and how they are pertinent to the patient's plan of care.  Home Exercise Program  Access Code: [email or web address]  URL: [email or web address]  Date: 04/09/2024  Prepared by:  Milana Court    Exercises  - Isometric Shoulder Flexion at Wall  - 2 x daily - 2 sets - 10 reps - 5 hold  - Isometric Shoulder Abduction at Wall  - 2 x daily - 2 sets - 10 reps - 5 hold  - Isometric Shoulder External Rotation at Wall  - 2 x daily - 2 sets - 10 reps - 5 hold  - Standing Isometric Shoulder Internal Rotation with Towel Roll at Doorway  - 2 x daily - 2 sets - 10 reps - 5 hold  - Supine Shoulder Flexion Extension AAROM with Dowel  - 2-3 x daily - 1-2 sets - 10 reps  - Supine Shoulder External Rotation with Dowel (Mirrored)  - 2-3 x daily - 1-2 sets - 10 reps  - Supine Shoulder Abduction AAROM with Dowel (Mirrored)  - 2-3 x daily - 1-2 sets - 10 reps  - Doorway Pec Stretch at 60 Elevation  - 2-3 x daily - 2 reps - 30-60 seconds hold    Shoulder ER/IR with red band  Prone exercises  Sleeper stretch as tolerated  Table push up      ASSESSMENT                                                                                                            Patient tolerated crate lifting well. Good fatigue after session. Able to lift 25 lbs.   Pain/symptoms after session (out of 10): 0  Education:   - Results of above outlined education: Verbalizes understanding and Demonstrates understanding    PLAN                                                                                                                           Suggestions for next session as indicated: Progress per plan of care       Therapy procedure time and total treatment time can be found documented on the Time Entry flowsheet

## 2024-09-03 ENCOUNTER — TELEPHONE (OUTPATIENT)
Age: 39
End: 2024-09-03

## 2024-09-03 NOTE — TELEPHONE ENCOUNTER
Pt tested positive for Covid yesterday, been having symptoms about a week ago this include fever chills and congestion. Pt stated she doesn't need any medication she has been taking OTC. She just wants it documented in her chart.

## 2024-09-04 ENCOUNTER — TELEPHONE (OUTPATIENT)
Age: 39
End: 2024-09-04

## 2024-09-04 ENCOUNTER — OFFICE VISIT (OUTPATIENT)
Dept: URGENT CARE | Facility: CLINIC | Age: 39
End: 2024-09-04
Payer: COMMERCIAL

## 2024-09-04 VITALS
OXYGEN SATURATION: 100 % | WEIGHT: 230 LBS | HEIGHT: 62 IN | TEMPERATURE: 97.1 F | BODY MASS INDEX: 42.33 KG/M2 | RESPIRATION RATE: 18 BRPM | HEART RATE: 104 BPM

## 2024-09-04 DIAGNOSIS — U07.1 COVID-19: Primary | ICD-10-CM

## 2024-09-04 PROBLEM — Z98.891 HISTORY OF 2 CESAREAN SECTIONS: Status: ACTIVE | Noted: 2020-05-14

## 2024-09-04 PROCEDURE — 99213 OFFICE O/P EST LOW 20 MIN: CPT

## 2024-09-04 PROCEDURE — S9088 SERVICES PROVIDED IN URGENT: HCPCS

## 2024-09-04 NOTE — TELEPHONE ENCOUNTER
Tested positive for Covid on Monday, symptoms started a week ago headache chills body aches. An apt was offer patient would like to see what PCP recommends first.

## 2024-09-04 NOTE — PROGRESS NOTES
[facility]    NAME: Harriet Morales is a 38 y.o. female  : 1985    MRN: [id number]  DATE: 2024  TIME: 6:43 PM    Assessment and Plan   COVID-19 [U07.1]  1. COVID-19          At home COVID is positive. Sinus pressure likely related to viral symptoms. Fevers are improving.   Continue supportive care for symptoms.   Educated on return precautions to PCP or to ED.     Patient Instructions     Keep hydrated and rest as able.  COVID is positive you need to stay home until your fever is gone for 24 hours and you are doing better.   Wear your mask and wash hands often.  See your family doctor in 3-5 days; call them first  Go to an emergency department if symptoms worsen, especially shortness or breath, weakness, or if unable to tolerate fluid intake.       Chief Complaint     Chief Complaint   Patient presents with    Cold Like Symptoms     Symptoms started about a week ago.          History of Present Illness       Presents with sick symptoms that started 5 days ago including cough, congestion, sinus pain/pressure. Symptoms include. She took two at home COVID tests which were positive. She does report history of asthma and has to use in inhaler at home. She also have albuterol nebulizer. Denies shortness of breath. Fevers decreasing to , previously 101.         Review of Systems   Review of Systems   Constitutional:  Positive for fever. Negative for chills.   HENT:  Positive for congestion and sinus pressure. Negative for sore throat.    Respiratory:  Positive for cough. Negative for shortness of breath.    Cardiovascular:  Negative for chest pain.   Gastrointestinal:  Negative for abdominal pain.   Musculoskeletal:  Negative for myalgias.   Psychiatric/Behavioral:  Negative for confusion.          Current Medications       Current Outpatient Medications:     albuterol (Ventolin HFA) 90 mcg/act inhaler, Inhale 2 puffs every 6 (six) hours as needed for wheezing or shortness of breath,  "Disp: 18 g, Rfl: 0    Enskyce 0.15-30 MG-MCG per tablet, Take 1 tablet by mouth daily, Disp: , Rfl:     fluticasone (FLONASE) 50 mcg/act nasal spray, 1 spray into each nostril daily, Disp: 11.1 mL, Rfl: 0    levonorgestrel-ethinyl estradiol (NORDETTE) 0.15-30 MG-MCG per tablet, Take 1 tablet by mouth daily, Disp: , Rfl:     Current Allergies     Allergies as of 2024 - Reviewed 2024   Allergen Reaction Noted    Penicillins  2017    Zithromax [azithromycin] GI Intolerance 2017    Clindamycin Rash 2017            The following portions of the patient's history were reviewed and updated as appropriate: allergies, current medications, past family history, past medical history, past social history, past surgical history and problem list.     Past Medical History:   Diagnosis Date    Elevated cholesterol     Gestational diabetes mellitus, antepartum 2015    Varicella without complication 2021    Formatting of this note might be different from the original. Formatting of this note might be different from the original. Age 9       Past Surgical History:   Procedure Laterality Date     SECTION      DILATION AND CURETTAGE OF UTERUS         Family History   Problem Relation Age of Onset    Diabetes Mother          Medications have been verified.      Objective   Pulse 104   Temp (!) 97.1 °F (36.2 °C)   Resp 18   Ht 5' 2\" (1.575 m)   Wt 104 kg (230 lb)   SpO2 100%   BMI 42.07 kg/m²        Physical Exam     Physical Exam  Vitals reviewed.   Constitutional:       General: She is not in acute distress.     Appearance: Normal appearance.   HENT:      Right Ear: Tympanic membrane, ear canal and external ear normal.      Left Ear: Tympanic membrane, ear canal and external ear normal.      Nose: Nose normal.      Mouth/Throat:      Mouth: Mucous membranes are moist.      Pharynx: No posterior oropharyngeal erythema.   Eyes:      Conjunctiva/sclera: Conjunctivae normal. "   Cardiovascular:      Rate and Rhythm: Normal rate and regular rhythm.      Pulses: Normal pulses.      Heart sounds: Normal heart sounds. No murmur heard.  Pulmonary:      Effort: Pulmonary effort is normal. No respiratory distress.      Breath sounds: Normal breath sounds.   Skin:     General: Skin is warm and dry.   Neurological:      General: No focal deficit present.      Mental Status: She is alert and oriented to person, place, and time.   Psychiatric:         Mood and Affect: Mood normal.         Behavior: Behavior normal.

## 2024-09-04 NOTE — PATIENT INSTRUCTIONS
Keep hydrated and rest as able.  COVID is positive you need to stay home until your fever is gone for 24 hours and you are doing better.   Wear your mask and wash hands often.  See your family doctor in 3-5 days; call them first  Go to an emergency department if symptoms worsen, especially shortness or breath, weakness, or if unable to tolerate fluid intake.

## 2024-09-05 ENCOUNTER — APPOINTMENT (OUTPATIENT)
Dept: RADIOLOGY | Facility: CLINIC | Age: 39
End: 2024-09-05
Payer: COMMERCIAL

## 2024-09-05 DIAGNOSIS — R05.1 ACUTE COUGH: ICD-10-CM

## 2024-09-05 DIAGNOSIS — R05.1 ACUTE COUGH: Primary | ICD-10-CM

## 2024-09-05 PROCEDURE — 71046 X-RAY EXAM CHEST 2 VIEWS: CPT

## 2024-09-05 NOTE — TELEPHONE ENCOUNTER
Patient called wanted doctor to know that she went to urgent care yesterday.  They told her to take Affrin which is helping.  She asked that the doctor review the notes from urgent care in case there is anything else she should be doing. Please contact patient and advise.  Thank you.

## 2024-09-06 LAB
ALBUMIN SERPL-MCNC: 4.1 G/DL (ref 3.5–5.7)
ALP SERPL-CCNC: 56 U/L (ref 35–120)
ALT SERPL-CCNC: 19 U/L
ANION GAP SERPL CALCULATED.3IONS-SCNC: 10 MMOL/L (ref 3–11)
AST SERPL-CCNC: 18 U/L
BASOPHILS # BLD AUTO: 0 THOU/CMM (ref 0–0.1)
BASOPHILS NFR BLD AUTO: 1 %
BILIRUB SERPL-MCNC: 0.5 MG/DL (ref 0.2–1)
BUN SERPL-MCNC: 10 MG/DL (ref 7–25)
CALCIUM SERPL-MCNC: 9.1 MG/DL (ref 8.5–10.1)
CHLORIDE SERPL-SCNC: 103 MMOL/L (ref 100–109)
CO2 SERPL-SCNC: 26 MMOL/L (ref 21–31)
CREAT SERPL-MCNC: 0.84 MG/DL (ref 0.4–1.1)
CYTOLOGY CMNT CVX/VAG CYTO-IMP: ABNORMAL
DIFFERENTIAL METHOD BLD: NORMAL
EOSINOPHIL # BLD AUTO: 0.2 THOU/CMM (ref 0–0.5)
EOSINOPHIL NFR BLD AUTO: 3 %
ERYTHROCYTE [DISTWIDTH] IN BLOOD BY AUTOMATED COUNT: 13.4 % (ref 12–16)
GFR/BSA.PRED SERPLBLD CYS-BASED-ARV: 91 ML/MIN/{1.73_M2}
GLUCOSE SERPL-MCNC: 103 MG/DL (ref 65–99)
HCT VFR BLD AUTO: 41 % (ref 35–43)
HGB BLD-MCNC: 13.7 G/DL (ref 11.5–14.5)
LYMPHOCYTES # BLD AUTO: 2.9 THOU/CMM (ref 1–3)
LYMPHOCYTES NFR BLD AUTO: 47 %
MCH RBC QN AUTO: 30.2 PG (ref 26–34)
MCHC RBC AUTO-ENTMCNC: 33.3 G/DL (ref 32–37)
MCV RBC AUTO: 91 FL (ref 80–100)
MONOCYTES # BLD AUTO: 0.5 THOU/CMM (ref 0.3–1)
MONOCYTES NFR BLD AUTO: 8 %
NEUTROPHILS # BLD AUTO: 2.5 THOU/CMM (ref 1.8–7.8)
NEUTROPHILS NFR BLD AUTO: 41 %
PLATELET # BLD AUTO: 314 THOU/CMM (ref 140–350)
PMV BLD REES-ECKER: 8.6 FL (ref 7.5–11.3)
POTASSIUM SERPL-SCNC: 4.3 MMOL/L (ref 3.5–5.2)
PROT SERPL-MCNC: 7.5 G/DL (ref 6.3–8.3)
RBC # BLD AUTO: 4.52 MILL/CMM (ref 3.7–4.7)
SODIUM SERPL-SCNC: 139 MMOL/L (ref 135–145)
WBC # BLD AUTO: 6.2 THOU/CMM (ref 4–10)

## 2024-09-18 ENCOUNTER — OFFICE VISIT (OUTPATIENT)
Dept: FAMILY MEDICINE CLINIC | Facility: CLINIC | Age: 39
End: 2024-09-18
Payer: COMMERCIAL

## 2024-09-18 VITALS
HEIGHT: 62 IN | WEIGHT: 231 LBS | BODY MASS INDEX: 42.51 KG/M2 | HEART RATE: 94 BPM | DIASTOLIC BLOOD PRESSURE: 86 MMHG | TEMPERATURE: 96.5 F | OXYGEN SATURATION: 99 % | SYSTOLIC BLOOD PRESSURE: 132 MMHG

## 2024-09-18 DIAGNOSIS — J01.00 ACUTE NON-RECURRENT MAXILLARY SINUSITIS: Primary | ICD-10-CM

## 2024-09-18 PROCEDURE — 99214 OFFICE O/P EST MOD 30 MIN: CPT | Performed by: PHYSICIAN ASSISTANT

## 2024-09-18 RX ORDER — LEVOCETIRIZINE DIHYDROCHLORIDE 5 MG/1
1 TABLET, FILM COATED ORAL EVERY EVENING
COMMUNITY
Start: 2024-08-26

## 2024-09-18 RX ORDER — DOXYCYCLINE HYCLATE 100 MG
100 TABLET ORAL 2 TIMES DAILY
Qty: 14 TABLET | Refills: 0 | Status: SHIPPED | OUTPATIENT
Start: 2024-09-18 | End: 2024-09-25

## 2024-09-18 RX ORDER — AZELASTINE HYDROCHLORIDE, FLUTICASONE PROPIONATE 137; 50 UG/1; UG/1
1 SPRAY, METERED NASAL 2 TIMES DAILY
COMMUNITY

## 2024-09-18 NOTE — PROGRESS NOTES
Ambulatory Visit  Name: Harriet Morales      : 1985      MRN: [id number]  Encounter Provider: Jonnathan Garsia PA-C  Encounter Date: 2024   Encounter department: [facility]    Assessment & Plan  Acute non-recurrent maxillary sinusitis  Suspect bacterial sinusitis. Recommend doxycyline, mucinex, flonase, tylenol. Follow up prn  Orders:    doxycycline hyclate (VIBRA-TABS) 100 mg tablet; Take 1 tablet (100 mg total) by mouth 2 (two) times a day for 7 days         History of Present Illness     Pt presents with lingering sinus pain, congestion, ear pain, cough. Had covid 2 weeks ago, seems to be worsening. No fevers, chills. No SOB. Using OTC cough/cold medicine.       Review of Systems   Constitutional:  Negative for chills, fatigue and fever.   HENT:  Positive for congestion, ear pain, rhinorrhea, sinus pressure and sinus pain. Negative for hearing loss, nosebleeds, postnasal drip, sneezing and sore throat.    Eyes:  Negative for pain, discharge, itching and visual disturbance.   Respiratory:  Positive for cough. Negative for chest tightness, shortness of breath and wheezing.    Cardiovascular:  Negative for chest pain, palpitations and leg swelling.   Gastrointestinal:  Negative for abdominal pain, blood in stool, constipation, diarrhea, nausea and vomiting.   Genitourinary:  Negative for frequency and urgency.   Neurological:  Negative for dizziness, light-headedness and numbness.     Past Medical History:   Diagnosis Date    Elevated cholesterol     Gestational diabetes mellitus, antepartum 2015    Varicella without complication 2021    Formatting of this note might be different from the original. Formatting of this note might be different from the original. Age 9     Past Surgical History:   Procedure Laterality Date     SECTION      DILATION AND CURETTAGE OF UTERUS       Family History   Problem Relation Age of Onset    Diabetes Mother      Social History  "    Tobacco Use    Smoking status: Never     Passive exposure: Never    Smokeless tobacco: Never   Vaping Use    Vaping status: Never Used   Substance and Sexual Activity    Alcohol use: Never    Drug use: Never    Sexual activity: Not on file     Current Outpatient Medications on File Prior to Visit   Medication Sig    levocetirizine (XYZAL) 5 MG tablet Take 1 tablet by mouth every evening    albuterol (Ventolin HFA) 90 mcg/act inhaler Inhale 2 puffs every 6 (six) hours as needed for wheezing or shortness of breath    Azelastine-Fluticasone 137-50 MCG/ACT SUSP 1 spray by Each Nare route 2 (two) times a day    Enskyce 0.15-30 MG-MCG per tablet Take 1 tablet by mouth daily    [DISCONTINUED] fluticasone (FLONASE) 50 mcg/act nasal spray 1 spray into each nostril daily    [DISCONTINUED] levonorgestrel-ethinyl estradiol (NORDETTE) 0.15-30 MG-MCG per tablet Take 1 tablet by mouth daily     Allergies   Allergen Reactions    Penicillins      Family hx of severe reaction - never taken    Zithromax [Azithromycin] GI Intolerance    Clindamycin Rash     Immunization History   Administered Date(s) Administered    DTaP 1985, 01/09/1987, 02/12/1987, 06/28/1989, 03/29/1991    Hep B, Adolescent or Pediatric 06/11/2003, 07/09/2003, 06/30/2004    IPV 1985, 02/11/1987, 06/28/1989, 03/29/1991    MMR 03/15/1988, 05/02/2005    Pneumococcal Conjugate 13-Valent 07/09/2021    Td (adult), adsorbed 06/30/2004    Tdap 03/30/2015, 10/27/2020    Tuberculin Skin Test-PPD Intradermal 01/26/2017     Objective     /86 (BP Location: Left arm, Patient Position: Sitting, Cuff Size: Large)   Pulse 94   Temp (!) 96.5 °F (35.8 °C)   Ht 5' 2\" (1.575 m)   Wt 105 kg (231 lb)   SpO2 99%   BMI 42.25 kg/m²     Physical Exam  Vitals and nursing note reviewed.   Constitutional:       General: She is not in acute distress.     Appearance: She is well-developed.   HENT:      Head: Normocephalic and atraumatic.      Right Ear: Tympanic " membrane normal.      Left Ear: Tympanic membrane normal.      Nose: Congestion present.      Right Sinus: Maxillary sinus tenderness present.      Left Sinus: Maxillary sinus tenderness present.      Mouth/Throat:      Mouth: Mucous membranes are moist.      Pharynx: Oropharynx is clear.   Eyes:      Conjunctiva/sclera: Conjunctivae normal.   Cardiovascular:      Rate and Rhythm: Normal rate and regular rhythm.      Heart sounds: No murmur heard.  Pulmonary:      Effort: Pulmonary effort is normal. No respiratory distress.      Breath sounds: Normal breath sounds. No wheezing, rhonchi or rales.   Musculoskeletal:         General: No swelling.      Cervical back: Neck supple.   Skin:     General: Skin is warm and dry.      Capillary Refill: Capillary refill takes less than 2 seconds.   Neurological:      Mental Status: She is alert.   Psychiatric:         Mood and Affect: Mood normal.

## 2024-11-01 DIAGNOSIS — R05.9 COUGH: ICD-10-CM

## 2024-11-01 RX ORDER — ALBUTEROL SULFATE 90 UG/1
INHALANT RESPIRATORY (INHALATION)
Qty: 8.5 G | Refills: 0 | Status: SHIPPED | OUTPATIENT
Start: 2024-11-01

## 2024-11-13 ENCOUNTER — OFFICE VISIT (OUTPATIENT)
Dept: URGENT CARE | Facility: CLINIC | Age: 39
End: 2024-11-13
Payer: COMMERCIAL

## 2024-11-13 VITALS
SYSTOLIC BLOOD PRESSURE: 144 MMHG | TEMPERATURE: 97 F | HEART RATE: 92 BPM | RESPIRATION RATE: 18 BRPM | DIASTOLIC BLOOD PRESSURE: 92 MMHG | OXYGEN SATURATION: 98 %

## 2024-11-13 DIAGNOSIS — J01.00 ACUTE NON-RECURRENT MAXILLARY SINUSITIS: Primary | ICD-10-CM

## 2024-11-13 PROCEDURE — 99213 OFFICE O/P EST LOW 20 MIN: CPT

## 2024-11-13 PROCEDURE — S9088 SERVICES PROVIDED IN URGENT: HCPCS

## 2024-11-13 RX ORDER — PREDNISONE 20 MG/1
40 TABLET ORAL DAILY
Qty: 10 TABLET | Refills: 0 | Status: SHIPPED | OUTPATIENT
Start: 2024-11-13 | End: 2024-11-18

## 2024-11-13 RX ORDER — DOXYCYCLINE 100 MG/1
100 TABLET ORAL 2 TIMES DAILY
Qty: 14 TABLET | Refills: 0 | Status: SHIPPED | OUTPATIENT
Start: 2024-11-13 | End: 2024-11-20

## 2024-11-13 NOTE — PATIENT INSTRUCTIONS
Take antibiotic and prednisone as prescribed.  Flonase nasal spray.  Warm compresses over sinuses  Steam treatment (practice proper safety precautions when handing hot liquids/steam)  Over the counter saline nasal spray  Sinus rinses mixed with distilled water.    Follow up with PCP in 3-5 days.  Proceed to  ER if symptoms worsen.

## 2024-11-13 NOTE — PROGRESS NOTES
[facility]        NAME: Harriet Morales is a 39 y.o. female  : 1985    MRN: [id number]  DATE: 2024  TIME: 5:52 PM    Assessment and Plan   Acute non-recurrent maxillary sinusitis [J01.00]  1. Acute non-recurrent maxillary sinusitis  doxycycline (ADOXA) 100 MG tablet    predniSONE 20 mg tablet            Patient Instructions     Take antibiotic and prednisone as prescribed.  Flonase nasal spray.  Warm compresses over sinuses  Steam treatment (practice proper safety precautions when handing hot liquids/steam)  Over the counter saline nasal spray  Sinus rinses mixed with distilled water.    Follow up with PCP in 3-5 days.  Proceed to  ER if symptoms worsen.    Chief Complaint     Chief Complaint   Patient presents with    Earache     Right ear pain x2 weeks    Sore Throat     Sore throat x5 days. No fevers. Taking nasal spray and mucinex.         History of Present Illness       The patient presents today with complaints of maxillary sinus pain/pressure, nasal congestion, R ear pain x 2 weeks. Then for the past 5 days she has also had a sore throat. She has been taking OTC mucinex and nasal sprays with minimal relief. Denies fever/chills, cough, SOB, wheezing, chest congestion.         Review of Systems   Review of Systems   Constitutional:  Negative for chills and fever.   HENT:  Positive for congestion, ear pain (right), postnasal drip, rhinorrhea, sinus pressure, sinus pain and sore throat.    Respiratory:  Negative for cough, chest tightness, shortness of breath and wheezing.    Gastrointestinal:  Negative for abdominal pain, diarrhea, nausea and vomiting.   Genitourinary:  Negative for difficulty urinating.   Musculoskeletal:  Negative for myalgias.   Skin:  Negative for rash.         Current Medications       Current Outpatient Medications:     albuterol (PROVENTIL HFA,VENTOLIN HFA) 90 mcg/act inhaler, inhale 2 puffs by mouth and INTO THE LUNGS every 6 hours if needed for wheezing  or shortness of breath, Disp: 8.5 g, Rfl: 0    Azelastine-Fluticasone 137-50 MCG/ACT SUSP, 1 spray by Each Nare route 2 (two) times a day, Disp: , Rfl:     doxycycline (ADOXA) 100 MG tablet, Take 1 tablet (100 mg total) by mouth 2 (two) times a day for 7 days, Disp: 14 tablet, Rfl: 0    Enskyce 0.15-30 MG-MCG per tablet, Take 1 tablet by mouth daily, Disp: , Rfl:     levocetirizine (XYZAL) 5 MG tablet, Take 1 tablet by mouth every evening, Disp: , Rfl:     predniSONE 20 mg tablet, Take 2 tablets (40 mg total) by mouth daily for 5 days, Disp: 10 tablet, Rfl: 0    Current Allergies     Allergies as of 2024 - Reviewed 2024   Allergen Reaction Noted    Penicillins  2017    Zithromax [azithromycin] GI Intolerance 2017    Clindamycin Rash 2017            The following portions of the patient's history were reviewed and updated as appropriate: allergies, current medications, past family history, past medical history, past social history, past surgical history and problem list.     Past Medical History:   Diagnosis Date    Elevated cholesterol     Gestational diabetes mellitus, antepartum 2015    Varicella without complication 2021    Formatting of this note might be different from the original. Formatting of this note might be different from the original. Age 9       Past Surgical History:   Procedure Laterality Date     SECTION      DILATION AND CURETTAGE OF UTERUS         Family History   Problem Relation Age of Onset    Diabetes Mother          Medications have been verified.        Objective   /92   Pulse 92   Temp (!) 97 °F (36.1 °C)   Resp 18   SpO2 98%        Physical Exam     Physical Exam  Vitals and nursing note reviewed.   Constitutional:       General: She is not in acute distress.     Appearance: Normal appearance. She is not ill-appearing.   HENT:      Head: Normocephalic and atraumatic.      Right Ear: Tympanic membrane, ear canal and external ear  normal.      Left Ear: Tympanic membrane, ear canal and external ear normal.      Nose: Congestion present. No rhinorrhea.      Right Sinus: Maxillary sinus tenderness present. No frontal sinus tenderness.      Left Sinus: Maxillary sinus tenderness present. No frontal sinus tenderness.      Mouth/Throat:      Lips: Pink.      Mouth: Mucous membranes are moist.      Pharynx: Posterior oropharyngeal erythema and postnasal drip present. No oropharyngeal exudate.      Tonsils: No tonsillar exudate.   Eyes:      General: Vision grossly intact.      Extraocular Movements: Extraocular movements intact.      Pupils: Pupils are equal, round, and reactive to light.   Cardiovascular:      Rate and Rhythm: Normal rate and regular rhythm.      Heart sounds: Normal heart sounds. No murmur heard.  Pulmonary:      Effort: Pulmonary effort is normal. No respiratory distress.      Breath sounds: Normal breath sounds. No decreased air movement. No decreased breath sounds, wheezing, rhonchi or rales.   Musculoskeletal:         General: Normal range of motion.      Cervical back: Normal range of motion.   Lymphadenopathy:      Cervical: No cervical adenopathy.   Skin:     General: Skin is warm.      Findings: No rash.   Neurological:      Mental Status: She is alert and oriented to person, place, and time.      Motor: Motor function is intact.      Gait: Gait is intact.   Psychiatric:         Attention and Perception: Attention normal.         Mood and Affect: Mood normal.

## 2025-01-07 ENCOUNTER — HOSPITAL ENCOUNTER (OUTPATIENT)
Dept: RADIOLOGY | Facility: MEDICAL CENTER | Age: 40
Discharge: HOME | End: 2025-01-07
Payer: COMMERCIAL

## 2025-01-07 DIAGNOSIS — R10.2 PELVIC AND PERINEAL PAIN: ICD-10-CM

## 2025-01-07 PROCEDURE — 76856 US EXAM PELVIC COMPLETE: CPT

## 2025-01-07 PROCEDURE — 76830 TRANSVAGINAL US NON-OB: CPT

## 2025-01-28 ENCOUNTER — OFFICE VISIT (OUTPATIENT)
Dept: FAMILY MEDICINE CLINIC | Facility: CLINIC | Age: 40
End: 2025-01-28
Payer: COMMERCIAL

## 2025-01-28 ENCOUNTER — APPOINTMENT (OUTPATIENT)
Dept: LAB | Facility: CLINIC | Age: 40
End: 2025-01-28
Payer: COMMERCIAL

## 2025-01-28 VITALS
BODY MASS INDEX: 42.33 KG/M2 | HEART RATE: 91 BPM | WEIGHT: 230 LBS | DIASTOLIC BLOOD PRESSURE: 76 MMHG | SYSTOLIC BLOOD PRESSURE: 118 MMHG | TEMPERATURE: 97.1 F | HEIGHT: 62 IN | OXYGEN SATURATION: 99 %

## 2025-01-28 DIAGNOSIS — K52.9 GASTROENTERITIS: ICD-10-CM

## 2025-01-28 DIAGNOSIS — K21.00 GASTROESOPHAGEAL REFLUX DISEASE WITH ESOPHAGITIS, UNSPECIFIED WHETHER HEMORRHAGE: ICD-10-CM

## 2025-01-28 DIAGNOSIS — K52.9 GASTROENTERITIS: Primary | ICD-10-CM

## 2025-01-28 DIAGNOSIS — R14.0 ABDOMINAL BLOATING: ICD-10-CM

## 2025-01-28 DIAGNOSIS — J01.00 SUBACUTE MAXILLARY SINUSITIS: ICD-10-CM

## 2025-01-28 PROBLEM — Z86.16 HISTORY OF COVID-19: Status: RESOLVED | Noted: 2021-02-12 | Resolved: 2025-01-28

## 2025-01-28 PROCEDURE — 80053 COMPREHEN METABOLIC PANEL: CPT

## 2025-01-28 PROCEDURE — 36415 COLL VENOUS BLD VENIPUNCTURE: CPT

## 2025-01-28 PROCEDURE — 99213 OFFICE O/P EST LOW 20 MIN: CPT | Performed by: NURSE PRACTITIONER

## 2025-01-28 PROCEDURE — 85025 COMPLETE CBC W/AUTO DIFF WBC: CPT

## 2025-01-28 RX ORDER — NORETHINDRONE AND ETHINYL ESTRADIOL 1 MG-35MCG
1 KIT ORAL DAILY
COMMUNITY
Start: 2025-01-02

## 2025-01-28 RX ORDER — PANTOPRAZOLE SODIUM 20 MG/1
20 TABLET, DELAYED RELEASE ORAL
Qty: 30 TABLET | Refills: 0 | Status: SHIPPED | OUTPATIENT
Start: 2025-01-28 | End: 2025-02-27

## 2025-01-28 RX ORDER — METOCLOPRAMIDE 5 MG/1
5 TABLET ORAL
Qty: 45 TABLET | Refills: 0 | Status: SHIPPED | OUTPATIENT
Start: 2025-01-28 | End: 2025-02-12

## 2025-01-28 RX ORDER — AZELASTINE HYDROCHLORIDE, FLUTICASONE PROPIONATE 137; 50 UG/1; UG/1
1 SPRAY, METERED NASAL 2 TIMES DAILY
Qty: 23 G | Refills: 3 | Status: SHIPPED | OUTPATIENT
Start: 2025-01-28 | End: 2025-04-28

## 2025-01-28 NOTE — ASSESSMENT & PLAN NOTE
Orders:  •  Comprehensive metabolic panel; Future  •  CBC and differential; Future  •  metoclopramide (REGLAN) 5 mg tablet; Take 1 tablet (5 mg total) by mouth 3 (three) times a day before meals for 15 days  •  pantoprazole (PROTONIX) 20 mg tablet; Take 1 tablet (20 mg total) by mouth daily before breakfast  Patient with 1 week of upper GI symptoms and having abdominal blaoting and nausea and GERD symptoms will update labs and Reglan and PPI

## 2025-01-28 NOTE — ASSESSMENT & PLAN NOTE
Orders:  •  Comprehensive metabolic panel; Future  •  CBC and differential; Future  •  metoclopramide (REGLAN) 5 mg tablet; Take 1 tablet (5 mg total) by mouth 3 (three) times a day before meals for 15 days  •  pantoprazole (PROTONIX) 20 mg tablet; Take 1 tablet (20 mg total) by mouth daily before breakfast

## 2025-01-28 NOTE — PROGRESS NOTES
Name: Harriet Morales      : 1985      MRN: [id number]  Encounter Provider: HERNÁN Cárdenas  Encounter Date: 2025   Encounter department: [facility]  :  Assessment & Plan  Gastroenteritis    Orders:  •  Comprehensive metabolic panel; Future  •  CBC and differential; Future  •  metoclopramide (REGLAN) 5 mg tablet; Take 1 tablet (5 mg total) by mouth 3 (three) times a day before meals for 15 days  •  pantoprazole (PROTONIX) 20 mg tablet; Take 1 tablet (20 mg total) by mouth daily before breakfast  Patient with 1 week of upper GI symptoms and having abdominal blaoting and nausea and GERD symptoms will update labs and Reglan and PPI   Abdominal bloating    Orders:  •  Comprehensive metabolic panel; Future  •  CBC and differential; Future  •  metoclopramide (REGLAN) 5 mg tablet; Take 1 tablet (5 mg total) by mouth 3 (three) times a day before meals for 15 days  •  pantoprazole (PROTONIX) 20 mg tablet; Take 1 tablet (20 mg total) by mouth daily before breakfast    Gastroesophageal reflux disease with esophagitis, unspecified whether hemorrhage    Orders:  •  Comprehensive metabolic panel; Future  •  CBC and differential; Future  •  metoclopramide (REGLAN) 5 mg tablet; Take 1 tablet (5 mg total) by mouth 3 (three) times a day before meals for 15 days  •  pantoprazole (PROTONIX) 20 mg tablet; Take 1 tablet (20 mg total) by mouth daily before breakfast    Subacute maxillary sinusitis    Orders:  •  Azelastine-Fluticasone 137-50 MCG/ACT SUSP; 1 spray by Each Nare route 2 (two) times a day           History of Present Illness   Vomiting (Started last week )  pressure in the stomach  reflux  Chills  Night Sweats    Patient here today and rpeort sthat her son had the GI bug last week and she started with symptoms 7 days ago with having severe vomiting and then dry heaves and headache and slept and started feeling better and then started with acid taste in her mouth wont go away and  "having sweats and chills and can eat and drink but less eating small portions no diarrhea and stools are more yellow in color and having a feeling of pressure in the top part of her stomach and a squeezing feeling in the top part of her stomach       Review of Systems   Constitutional:  Negative for activity change, appetite change, chills, diaphoresis, fatigue, fever and unexpected weight change.   HENT:  Negative for congestion, ear pain, hearing loss, postnasal drip, sinus pressure, sinus pain, sneezing and sore throat.    Eyes:  Negative for pain, redness and visual disturbance.   Respiratory:  Negative for cough and shortness of breath.    Cardiovascular:  Negative for chest pain and leg swelling.   Gastrointestinal:  Positive for abdominal distention and vomiting. Negative for abdominal pain, diarrhea and nausea.        Heart burn    Endocrine: Negative.    Genitourinary: Negative.    Musculoskeletal:  Negative for arthralgias.   Allergic/Immunologic: Negative.    Neurological:  Negative for dizziness and light-headedness.   Hematological: Negative.    Psychiatric/Behavioral:  Negative for behavioral problems and dysphoric mood.        Objective   /76 (BP Location: Left arm, Patient Position: Sitting, Cuff Size: Large)   Pulse 91   Temp (!) 97.1 °F (36.2 °C)   Ht 5' 2\" (1.575 m)   Wt 104 kg (230 lb)   SpO2 99%   BMI 42.07 kg/m²      Physical Exam  Vitals and nursing note reviewed.   Constitutional:       General: She is not in acute distress.     Appearance: She is well-developed.   HENT:      Head: Normocephalic and atraumatic.   Eyes:      Pupils: Pupils are equal, round, and reactive to light.   Neck:      Thyroid: No thyromegaly.   Cardiovascular:      Rate and Rhythm: Normal rate and regular rhythm.      Heart sounds: Normal heart sounds. No murmur heard.  Pulmonary:      Effort: Pulmonary effort is normal. No respiratory distress.      Breath sounds: Normal breath sounds. No wheezing. "   Abdominal:      General: Bowel sounds are normal.      Palpations: Abdomen is soft.       Musculoskeletal:         General: Normal range of motion.      Cervical back: Normal range of motion.   Skin:     General: Skin is warm and dry.   Neurological:      Mental Status: She is alert and oriented to person, place, and time.

## 2025-01-29 ENCOUNTER — RESULTS FOLLOW-UP (OUTPATIENT)
Dept: FAMILY MEDICINE CLINIC | Facility: CLINIC | Age: 40
End: 2025-01-29

## 2025-01-29 LAB
ALBUMIN SERPL BCG-MCNC: 3.9 G/DL (ref 3.5–5)
ALP SERPL-CCNC: 60 U/L (ref 34–104)
ALT SERPL W P-5'-P-CCNC: 24 U/L (ref 7–52)
ANION GAP SERPL CALCULATED.3IONS-SCNC: 11 MMOL/L (ref 4–13)
AST SERPL W P-5'-P-CCNC: 20 U/L (ref 13–39)
BASOPHILS # BLD AUTO: 0.04 THOUSANDS/ΜL (ref 0–0.1)
BASOPHILS NFR BLD AUTO: 1 % (ref 0–1)
BILIRUB SERPL-MCNC: 0.24 MG/DL (ref 0.2–1)
BUN SERPL-MCNC: 8 MG/DL (ref 5–25)
CALCIUM SERPL-MCNC: 8.8 MG/DL (ref 8.4–10.2)
CHLORIDE SERPL-SCNC: 103 MMOL/L (ref 96–108)
CO2 SERPL-SCNC: 23 MMOL/L (ref 21–32)
CREAT SERPL-MCNC: 0.91 MG/DL (ref 0.6–1.3)
EOSINOPHIL # BLD AUTO: 0.08 THOUSAND/ΜL (ref 0–0.61)
EOSINOPHIL NFR BLD AUTO: 1 % (ref 0–6)
ERYTHROCYTE [DISTWIDTH] IN BLOOD BY AUTOMATED COUNT: 13.5 % (ref 11.6–15.1)
GFR SERPL CREATININE-BSD FRML MDRD: 80 ML/MIN/1.73SQ M
GLUCOSE P FAST SERPL-MCNC: 82 MG/DL (ref 65–99)
HCT VFR BLD AUTO: 40.6 % (ref 34.8–46.1)
HGB BLD-MCNC: 13.3 G/DL (ref 11.5–15.4)
IMM GRANULOCYTES # BLD AUTO: 0.02 THOUSAND/UL (ref 0–0.2)
IMM GRANULOCYTES NFR BLD AUTO: 0 % (ref 0–2)
LYMPHOCYTES # BLD AUTO: 2.08 THOUSANDS/ΜL (ref 0.6–4.47)
LYMPHOCYTES NFR BLD AUTO: 28 % (ref 14–44)
MCH RBC QN AUTO: 29.7 PG (ref 26.8–34.3)
MCHC RBC AUTO-ENTMCNC: 32.8 G/DL (ref 31.4–37.4)
MCV RBC AUTO: 91 FL (ref 82–98)
MONOCYTES # BLD AUTO: 0.46 THOUSAND/ΜL (ref 0.17–1.22)
MONOCYTES NFR BLD AUTO: 6 % (ref 4–12)
NEUTROPHILS # BLD AUTO: 4.71 THOUSANDS/ΜL (ref 1.85–7.62)
NEUTS SEG NFR BLD AUTO: 64 % (ref 43–75)
NRBC BLD AUTO-RTO: 0 /100 WBCS
PLATELET # BLD AUTO: 339 THOUSANDS/UL (ref 149–390)
PMV BLD AUTO: 10.7 FL (ref 8.9–12.7)
POTASSIUM SERPL-SCNC: 3.5 MMOL/L (ref 3.5–5.3)
PROT SERPL-MCNC: 7.6 G/DL (ref 6.4–8.4)
RBC # BLD AUTO: 4.48 MILLION/UL (ref 3.81–5.12)
SODIUM SERPL-SCNC: 137 MMOL/L (ref 135–147)
WBC # BLD AUTO: 7.39 THOUSAND/UL (ref 4.31–10.16)

## 2025-02-04 ENCOUNTER — TELEPHONE (OUTPATIENT)
Age: 40
End: 2025-02-04

## 2025-02-04 NOTE — TELEPHONE ENCOUNTER
Pt stated she is not able to come to the office, because her two kids are sick with fever. Pt asked if she should do inhaler every 4 hours or she should start doing the nebulizer machine? Please advise

## 2025-02-04 NOTE — TELEPHONE ENCOUNTER
Patient says she has a house full of sick people - both of her kids have Flu A and her  as well.  All have fever.  She says that she is wheezing and her 2 inhalers are only helping very temporarily.  She cannot get to the doctor's office as both kids are very sick - one can't even get out of bed.  She would like advice on how to get the wheezing under control, she's afraid it will get worse.  Please contact patient and advise.  Thank you.

## 2025-02-06 ENCOUNTER — OFFICE VISIT (OUTPATIENT)
Dept: URGENT CARE | Facility: CLINIC | Age: 40
End: 2025-02-06
Payer: COMMERCIAL

## 2025-02-06 ENCOUNTER — APPOINTMENT (OUTPATIENT)
Dept: RADIOLOGY | Facility: CLINIC | Age: 40
End: 2025-02-06
Payer: COMMERCIAL

## 2025-02-06 VITALS
RESPIRATION RATE: 18 BRPM | DIASTOLIC BLOOD PRESSURE: 76 MMHG | SYSTOLIC BLOOD PRESSURE: 124 MMHG | TEMPERATURE: 97.2 F | OXYGEN SATURATION: 99 % | HEART RATE: 102 BPM

## 2025-02-06 DIAGNOSIS — J06.9 UPPER RESPIRATORY TRACT INFECTION, UNSPECIFIED TYPE: Primary | ICD-10-CM

## 2025-02-06 DIAGNOSIS — R05.1 ACUTE COUGH: ICD-10-CM

## 2025-02-06 PROCEDURE — 99213 OFFICE O/P EST LOW 20 MIN: CPT

## 2025-02-06 PROCEDURE — 87636 SARSCOV2 & INF A&B AMP PRB: CPT

## 2025-02-06 PROCEDURE — S9088 SERVICES PROVIDED IN URGENT: HCPCS

## 2025-02-06 PROCEDURE — 71046 X-RAY EXAM CHEST 2 VIEWS: CPT

## 2025-02-06 RX ORDER — BENZONATATE 100 MG/1
100 CAPSULE ORAL 3 TIMES DAILY PRN
Qty: 20 CAPSULE | Refills: 0 | Status: SHIPPED | OUTPATIENT
Start: 2025-02-06

## 2025-02-06 RX ORDER — ALBUTEROL SULFATE 0.83 MG/ML
2.5 SOLUTION RESPIRATORY (INHALATION) EVERY 6 HOURS PRN
COMMUNITY
End: 2025-02-07 | Stop reason: SDUPTHER

## 2025-02-06 RX ORDER — PREDNISONE 20 MG/1
40 TABLET ORAL DAILY
Qty: 10 TABLET | Refills: 0 | Status: SHIPPED | OUTPATIENT
Start: 2025-02-06 | End: 2025-02-11

## 2025-02-06 NOTE — PROGRESS NOTES
[facility]        NAME: Harriet Morales is a 39 y.o. female  : 1985    MRN: [id number]  DATE: 2025  TIME: 5:56 PM    Assessment and Plan   Upper respiratory tract infection, unspecified type [J06.9]  1. Upper respiratory tract infection, unspecified type        2. Acute cough  XR chest pa and lateral    Covid/Flu- Office Collect Normal    predniSONE 20 mg tablet    Covid/Flu- Office Collect Normal    benzonatate (TESSALON PERLES) 100 mg capsule        COVID/flu PCR sent to lab, will f/u if positive. No acute pnuemonia on x-ray per provider read. Suspect viral illness given clinical presentation. Patient out of window for Tamiflu. Prednisone and tessalon perles as directed for symptoms given asthma history. VSS in clinic, appears in no acute distress. Educated on use of OTC products for additional relief of symptoms. Advised close follow-up with PCP or to report to the ER if symptoms worsen. Patient verbalizes understanding and agreeable to plan.      Patient Instructions     Take prednisone as directed for next 5 days. Symptoms of a viral infection may linger for up to 10 days. Your contagious period is the first 5-7 days of symptom onset. Continue over-the-counter products for other symptoms: tylenol for fevers/pain/body aches, sudafed and flonase (fluticasone) with nasal saline for congestion, mucinex/tessalon perles for cough, and airborne/emergen-c for vitamin supplementation. Continue inhalers/nebulizers as previously prescribed. Follow-up with PCP in 3-5 days if no improvement of symptoms. Report to ER sooner if symptoms worsen.       Chief Complaint     Chief Complaint   Patient presents with    Cough     Chills/headache initial symptoms. Now sore throat, cough, ear pain, wheezing for 4 days. Exposed to flu A. At home flu tests negative. Using neb treatments.         History of Present Illness       39 year old female presents for evaluation of fatigue, body aches, cough, and  congestion x 4 days. She reports she was exposed to family members with flu A but did a home flu test that was negative. She is not vaccinated against flu. She reports asthma history when sick and has been using nebulizers and inhalers during this time. Her last known fever was yesterday. She reports chest tightness, resolved with inhaler use. She reports minimally productive sputum, worse at night when lying down. She has tried OTC decongestants with minimal symptom relief. She relates she is prone to pneumonia.     URI   This is a new problem. The current episode started in the past 7 days. The problem has been unchanged. The maximum temperature recorded prior to her arrival was 100.4 - 100.9 F. The fever has been present for Less than 1 day. Associated symptoms include congestion, coughing, ear pain, headaches, rhinorrhea and a sore throat. Pertinent negatives include no abdominal pain, chest pain, diarrhea, dysuria, joint pain, joint swelling, nausea, neck pain, plugged ear sensation, rash, sinus pain, sneezing, swollen glands, vomiting or wheezing. She has tried decongestant and inhaler use for the symptoms. The treatment provided mild relief.       Review of Systems   Review of Systems   Constitutional:  Positive for activity change and fatigue. Negative for appetite change, chills and fever.   HENT:  Positive for congestion, ear pain, postnasal drip, rhinorrhea and sore throat. Negative for ear discharge, sinus pressure, sinus pain, sneezing and trouble swallowing.    Eyes:  Negative for visual disturbance.   Respiratory:  Positive for cough. Negative for chest tightness, shortness of breath and wheezing.    Cardiovascular:  Negative for chest pain and palpitations.   Gastrointestinal:  Negative for abdominal pain, constipation, diarrhea, nausea and vomiting.   Genitourinary:  Negative for dysuria.   Musculoskeletal:  Negative for arthralgias, back pain, joint pain, myalgias and neck pain.   Skin:  Negative  for color change, pallor and rash.   Allergic/Immunologic: Negative for environmental allergies and food allergies.   Neurological:  Positive for headaches. Negative for dizziness and light-headedness.         Current Medications       Current Outpatient Medications:     albuterol (2.5 mg/3 mL) 0.083 % nebulizer solution, Take 2.5 mg by nebulization every 6 (six) hours as needed for wheezing or shortness of breath, Disp: , Rfl:     Alyacen 1/35 1-35 MG-MCG per tablet, Take 1 tablet by mouth in the morning, Disp: , Rfl:     Azelastine-Fluticasone 137-50 MCG/ACT SUSP, 1 spray by Each Nare route 2 (two) times a day, Disp: 23 g, Rfl: 3    benzonatate (TESSALON PERLES) 100 mg capsule, Take 1 capsule (100 mg total) by mouth 3 (three) times a day as needed for cough, Disp: 20 capsule, Rfl: 0    levocetirizine (XYZAL) 5 MG tablet, Take 1 tablet by mouth every evening, Disp: , Rfl:     metoclopramide (REGLAN) 5 mg tablet, Take 1 tablet (5 mg total) by mouth 3 (three) times a day before meals for 15 days, Disp: 45 tablet, Rfl: 0    pantoprazole (PROTONIX) 20 mg tablet, Take 1 tablet (20 mg total) by mouth daily before breakfast, Disp: 30 tablet, Rfl: 0    predniSONE 20 mg tablet, Take 2 tablets (40 mg total) by mouth daily for 5 days, Disp: 10 tablet, Rfl: 0    albuterol (PROVENTIL HFA,VENTOLIN HFA) 90 mcg/act inhaler, inhale 2 puffs by mouth and INTO THE LUNGS every 6 hours if needed for wheezing or shortness of breath (Patient not taking: Reported on 2/6/2025), Disp: 8.5 g, Rfl: 0    Current Allergies     Allergies as of 02/06/2025 - Reviewed 02/06/2025   Allergen Reaction Noted    Penicillins  12/26/2017    Zithromax [azithromycin] GI Intolerance 12/26/2017    Clindamycin Rash 12/26/2017            The following portions of the patient's history were reviewed and updated as appropriate: allergies, current medications, past family history, past medical history, past social history, past surgical history and problem list.      Past Medical History:   Diagnosis Date    Elevated cholesterol     Gestational diabetes mellitus, antepartum 2015    Varicella without complication 2021    Formatting of this note might be different from the original. Formatting of this note might be different from the original. Age 9       Past Surgical History:   Procedure Laterality Date     SECTION      DILATION AND CURETTAGE OF UTERUS         Family History   Problem Relation Age of Onset    Diabetes Mother          Medications have been verified.        Objective   /76   Pulse 102   Temp (!) 97.2 °F (36.2 °C)   Resp 18   SpO2 99%        Physical Exam     Physical Exam  Vitals and nursing note reviewed.   Constitutional:       General: She is awake. She is not in acute distress.     Appearance: Normal appearance. She is well-developed and normal weight.   HENT:      Head: Normocephalic and atraumatic.      Right Ear: Hearing, tympanic membrane, ear canal and external ear normal.      Left Ear: Hearing, tympanic membrane, ear canal and external ear normal.      Nose: Congestion and rhinorrhea present. Rhinorrhea is clear.      Right Turbinates: Not enlarged, swollen or pale.      Left Turbinates: Not enlarged, swollen or pale.      Right Sinus: No maxillary sinus tenderness or frontal sinus tenderness.      Left Sinus: No maxillary sinus tenderness or frontal sinus tenderness.      Mouth/Throat:      Lips: Pink.      Mouth: Mucous membranes are moist.      Pharynx: Oropharynx is clear. Uvula midline. Posterior oropharyngeal erythema and postnasal drip present. No oropharyngeal exudate.      Tonsils: No tonsillar exudate or tonsillar abscesses. 2+ on the right. 2+ on the left.   Eyes:      Conjunctiva/sclera: Conjunctivae normal.   Cardiovascular:      Rate and Rhythm: Normal rate and regular rhythm.      Pulses: Normal pulses.      Heart sounds: Normal heart sounds.   Pulmonary:      Effort: Pulmonary effort is normal.      Breath  sounds: Normal breath sounds.   Musculoskeletal:      Cervical back: Full passive range of motion without pain, normal range of motion and neck supple.   Lymphadenopathy:      Cervical: No cervical adenopathy.   Skin:     General: Skin is warm and dry.   Neurological:      General: No focal deficit present.      Mental Status: She is alert and oriented to person, place, and time.   Psychiatric:         Mood and Affect: Mood normal.         Behavior: Behavior normal. Behavior is cooperative.         Thought Content: Thought content normal.         Judgment: Judgment normal.

## 2025-02-06 NOTE — PATIENT INSTRUCTIONS
Take prednisone as directed for next 5 days. Symptoms of a viral infection may linger for up to 10 days. Your contagious period is the first 5-7 days of symptom onset. Continue over-the-counter products for other symptoms: tylenol for fevers/pain/body aches, sudafed and flonase (fluticasone) with nasal saline for congestion, mucinex/tessalon perles for cough, and airborne/emergen-c for vitamin supplementation. Continue inhalers/nebulizers as previously prescribed. Follow-up with PCP in 3-5 days if no improvement of symptoms. Report to ER sooner if symptoms worsen.

## 2025-02-07 DIAGNOSIS — R06.2 WHEEZING: Primary | ICD-10-CM

## 2025-02-07 LAB
FLUAV RNA RESP QL NAA+PROBE: POSITIVE
FLUBV RNA RESP QL NAA+PROBE: NEGATIVE
SARS-COV-2 RNA RESP QL NAA+PROBE: NEGATIVE

## 2025-02-07 RX ORDER — ALBUTEROL SULFATE 0.83 MG/ML
2.5 SOLUTION RESPIRATORY (INHALATION) EVERY 6 HOURS PRN
Qty: 60 ML | Refills: 1 | Status: SHIPPED | OUTPATIENT
Start: 2025-02-07 | End: 2025-03-09

## 2025-02-07 NOTE — TELEPHONE ENCOUNTER
Patient tested positive for the flu, UC stated she needed to continue taking the albuterol solution

## 2025-02-19 DIAGNOSIS — K21.00 GASTROESOPHAGEAL REFLUX DISEASE WITH ESOPHAGITIS, UNSPECIFIED WHETHER HEMORRHAGE: ICD-10-CM

## 2025-02-19 DIAGNOSIS — K52.9 GASTROENTERITIS: ICD-10-CM

## 2025-02-19 DIAGNOSIS — R14.0 ABDOMINAL BLOATING: ICD-10-CM

## 2025-02-20 RX ORDER — PANTOPRAZOLE SODIUM 20 MG/1
TABLET, DELAYED RELEASE ORAL
Qty: 30 TABLET | Refills: 5 | Status: SHIPPED | OUTPATIENT
Start: 2025-02-20

## 2025-02-23 DIAGNOSIS — R06.2 WHEEZING: ICD-10-CM

## 2025-02-24 RX ORDER — ALBUTEROL SULFATE 0.83 MG/ML
SOLUTION RESPIRATORY (INHALATION)
Qty: 75 ML | Refills: 1 | Status: SHIPPED | OUTPATIENT
Start: 2025-02-24

## 2025-02-27 PROBLEM — K52.9 GASTROENTERITIS: Status: RESOLVED | Noted: 2025-01-28 | Resolved: 2025-02-27

## 2025-03-07 ENCOUNTER — OFFICE VISIT (OUTPATIENT)
Dept: FAMILY MEDICINE CLINIC | Facility: CLINIC | Age: 40
End: 2025-03-07
Payer: COMMERCIAL

## 2025-03-07 VITALS
WEIGHT: 231 LBS | BODY MASS INDEX: 42.51 KG/M2 | DIASTOLIC BLOOD PRESSURE: 82 MMHG | HEART RATE: 71 BPM | SYSTOLIC BLOOD PRESSURE: 136 MMHG | OXYGEN SATURATION: 97 % | TEMPERATURE: 99.1 F | HEIGHT: 62 IN

## 2025-03-07 DIAGNOSIS — J01.00 ACUTE NON-RECURRENT MAXILLARY SINUSITIS: Primary | ICD-10-CM

## 2025-03-07 PROCEDURE — 99213 OFFICE O/P EST LOW 20 MIN: CPT

## 2025-03-07 RX ORDER — DOXYCYCLINE HYCLATE 100 MG
100 TABLET ORAL 2 TIMES DAILY
Qty: 14 TABLET | Refills: 0 | Status: SHIPPED | OUTPATIENT
Start: 2025-03-07 | End: 2025-03-14

## 2025-03-07 RX ORDER — METHYLPREDNISOLONE 4 MG/1
TABLET ORAL
Qty: 21 EACH | Refills: 0 | Status: SHIPPED | OUTPATIENT
Start: 2025-03-07

## 2025-03-07 NOTE — PROGRESS NOTES
"Name: Harriet Morales      : 1985      MRN: [id number]  Encounter Provider: Shantel Cheng PA-C  Encounter Date: 3/7/2025   Encounter department: [facility]  :  Assessment & Plan  Acute non-recurrent maxillary sinusitis  Patient presents for evaluation of sinus congestion, pressure x 1 week  Defers covid/flu testing  Physical exam significant for b/l maxillary and frontal sinus tenderness, otherwise unremarkable - clear lungs, afebrile  Will start on doxycycline BID x 7 days and medrol for sinusitis, discussed side effects of medications  Continue supportive care, stay hydrated, and rest  To call if symptoms persist/worsen despite treatment    Orders:    doxycycline hyclate (VIBRA-TABS) 100 mg tablet; Take 1 tablet (100 mg total) by mouth 2 (two) times a day for 7 days    methylPREDNISolone 4 MG tablet therapy pack; Use as directed on package           History of Present Illness   CC: sinus congestion, pain, right ear pain x > 1 week     Patient presents for evaluation of sinus congestion/pain, right ear pain, cough x > 1 week   She has been taking dayquil which has been helping  Staying hydrated  No chest pain or SOB   No GI symptoms       Review of Systems   Constitutional:  Negative for chills, diaphoresis and fever.   HENT:  Positive for congestion, sinus pressure and sinus pain.    Respiratory:  Positive for cough. Negative for chest tightness, shortness of breath, wheezing and stridor.    Cardiovascular:  Negative for chest pain and palpitations.   Neurological:  Positive for headaches. Negative for dizziness and light-headedness.       Objective   /82 (BP Location: Left arm, Patient Position: Sitting, Cuff Size: Large)   Pulse 71   Temp 99.1 °F (37.3 °C)   Ht 5' 2\" (1.575 m)   Wt 105 kg (231 lb)   SpO2 97%   BMI 42.25 kg/m²      Physical Exam  Vitals reviewed.   Constitutional:       General: She is not in acute distress.     Appearance: Normal appearance. She is not " ill-appearing or diaphoretic.   HENT:      Head: Normocephalic and atraumatic.      Right Ear: Tympanic membrane, ear canal and external ear normal. There is no impacted cerumen.      Left Ear: Tympanic membrane, ear canal and external ear normal. There is no impacted cerumen.      Nose: Congestion present. No rhinorrhea.      Right Sinus: Maxillary sinus tenderness and frontal sinus tenderness present.      Left Sinus: Maxillary sinus tenderness and frontal sinus tenderness present.      Mouth/Throat:      Mouth: Mucous membranes are moist.      Pharynx: Oropharynx is clear. No oropharyngeal exudate or posterior oropharyngeal erythema.   Eyes:      General:         Right eye: No discharge.         Left eye: No discharge.      Conjunctiva/sclera: Conjunctivae normal.   Cardiovascular:      Rate and Rhythm: Normal rate and regular rhythm.      Pulses: Normal pulses.      Heart sounds: Normal heart sounds. No murmur heard.  Pulmonary:      Effort: Pulmonary effort is normal. No respiratory distress.      Breath sounds: Normal breath sounds. No wheezing, rhonchi or rales.   Musculoskeletal:         General: Normal range of motion.      Cervical back: Normal range of motion and neck supple.      Right lower leg: No edema.      Left lower leg: No edema.   Lymphadenopathy:      Cervical: No cervical adenopathy.   Skin:     General: Skin is warm.   Neurological:      General: No focal deficit present.      Mental Status: She is alert.      Gait: Gait normal.   Psychiatric:         Mood and Affect: Mood normal.

## 2025-03-11 NOTE — PATIENT INSTRUCTIONS
Maxillary sinus infection- take antibiotics as ordered  May use saline nasal spray for sinus congestion  Sinusitis   WHAT YOU NEED TO KNOW:   Sinusitis is inflammation or infection of your sinuses  It is most often caused by a virus  Acute sinusitis may last up to 12 weeks  Chronic sinusitis lasts longer than 12 weeks  Recurrent sinusitis means you have 4 or more times in 1 year  DISCHARGE INSTRUCTIONS:   Return to the emergency department if:   · Your eye and eyelid are red, swollen, and painful  · You cannot open your eye  · You have vision changes, such as double vision  · Your eyeball bulges out or you cannot move your eye  · You are more sleepy than normal, or you notice changes in your ability to think, move, or talk  · You have a stiff neck, a fever, or a bad headache  · You have swelling of your forehead or scalp  Contact your healthcare provider if:   · Your symptoms do not improve after 3 days  · Your symptoms do not go away after 10 days  · You have nausea and are vomiting  · Your nose is bleeding  · You have questions or concerns about your condition or care  Medicines: Your symptoms may go away on their own  Your healthcare provider may recommend watchful waiting for up to 10 days before starting antibiotics  You may  need any of the following:  · Acetaminophen  decreases pain and fever  It is available without a doctor's order  Ask how much to take and how often to take it  Follow directions  Read the labels of all other medicines you are using to see if they also contain acetaminophen, or ask your doctor or pharmacist  Acetaminophen can cause liver damage if not taken correctly  Do not use more than 4 grams (4,000 milligrams) total of acetaminophen in one day  · NSAIDs , such as ibuprofen, help decrease swelling, pain, and fever  This medicine is available with or without a doctor's order   NSAIDs can cause stomach bleeding or kidney problems in certain Presents to ER with concern of ongoing chest pain for 1 month. She reports pain normally on mid right side of chest. Today pain on left side near arm. She reports pain has never been on this side. She report neto when taking a deep breath or breathing fast. Reports there is no cause surrounding cp and pain when breathing. Reports hx of asthma. 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    people  If you take blood thinner medicine, always ask your healthcare provider if NSAIDs are safe for you  Always read the medicine label and follow directions  · Nasal steroid sprays  may help decrease inflammation in your nose and sinuses  · Decongestants  help reduce swelling and drain mucus in the nose and sinuses  They may help you breathe easier  · Antihistamines  help dry mucus in the nose and relieve sneezing  · Antibiotics  help treat or prevent a bacterial infection  · Take your medicine as directed  Contact your healthcare provider if you think your medicine is not helping or if you have side effects  Tell him or her if you are allergic to any medicine  Keep a list of the medicines, vitamins, and herbs you take  Include the amounts, and when and why you take them  Bring the list or the pill bottles to follow-up visits  Carry your medicine list with you in case of an emergency  Self-care:   · Rinse your sinuses  Use a sinus rinse device to rinse your nasal passages with a saline (salt water) solution or distilled water  Do not use tap water  This will help thin the mucus in your nose and rinse away pollen and dirt  It will also help reduce swelling so you can breathe normally  Ask your healthcare provider how often to do this  · Breathe in steam   Heat a bowl of water until you see steam  Lean over the bowl and make a tent over your head with a large towel  Breathe deeply for about 20 minutes  Be careful not to get too close to the steam or burn yourself  Do this 3 times a day  You can also breathe deeply when you take a hot shower  · Sleep with your head elevated  Place an extra pillow under your head before you go to sleep to help your sinuses drain  · Drink liquids as directed  Ask your healthcare provider how much liquid to drink each day and which liquids are best for you  Liquids will thin the mucus in your nose and help it drain   Avoid drinks that contain alcohol or caffeine  · Do not smoke, and avoid secondhand smoke  Nicotine and other chemicals in cigarettes and cigars can make your symptoms worse  Ask your healthcare provider for information if you currently smoke and need help to quit  E-cigarettes or smokeless tobacco still contain nicotine  Talk to your healthcare provider before you use these products  Prevent the spread of germs that cause sinusitis:  Wash your hands often with soap and water  Wash your hands after you use the bathroom, change a child's diaper, or sneeze  Wash your hands before you prepare or eat food  Follow up with your healthcare provider as directed: You may be referred to an ear, nose, and throat specialist  Write down your questions so you remember to ask them during your visits  © 2017 [facility] Information is for End User's use only and may not be sold, redistributed or otherwise used for commercial purposes  All illustrations and images included in CareNotes® are the copyrighted property of [employer]  or [employer]  The above information is an  only  It is not intended as medical advice for individual conditions or treatments  Talk to your doctor, nurse or pharmacist before following any medical regimen to see if it is safe and effective for you

## 2025-03-22 ENCOUNTER — OFFICE VISIT (OUTPATIENT)
Dept: URGENT CARE | Facility: CLINIC | Age: 40
End: 2025-03-22
Payer: COMMERCIAL

## 2025-03-22 VITALS
HEART RATE: 90 BPM | OXYGEN SATURATION: 99 % | SYSTOLIC BLOOD PRESSURE: 136 MMHG | TEMPERATURE: 96.5 F | DIASTOLIC BLOOD PRESSURE: 58 MMHG | RESPIRATION RATE: 18 BRPM

## 2025-03-22 DIAGNOSIS — R21 RASH: Primary | ICD-10-CM

## 2025-03-22 PROCEDURE — 99213 OFFICE O/P EST LOW 20 MIN: CPT

## 2025-03-22 PROCEDURE — S9088 SERVICES PROVIDED IN URGENT: HCPCS

## 2025-03-22 RX ORDER — OLOPATADINE HYDROCHLORIDE 1 MG/ML
1 SOLUTION/ DROPS OPHTHALMIC 2 TIMES DAILY
Qty: 5 ML | Refills: 1 | Status: SHIPPED | OUTPATIENT
Start: 2025-03-22

## 2025-03-22 RX ORDER — SODIUM FLUORIDE 6 MG/ML
PASTE, DENTIFRICE DENTAL
COMMUNITY
Start: 2025-03-13

## 2025-03-22 RX ORDER — PREDNISONE 10 MG/1
TABLET ORAL
Qty: 30 TABLET | Refills: 0 | Status: SHIPPED | OUTPATIENT
Start: 2025-03-22 | End: 2025-04-03

## 2025-03-22 NOTE — PROGRESS NOTES
[facility]        NAME: Harriet Morales is a 39 y.o. female  : 1985    MRN: [id number]  DATE: 2025  TIME: 10:13 AM    Assessment and Plan   Rash [R21]  1. Rash  olopatadine (PATANOL) 0.1 % ophthalmic solution    predniSONE 10 mg tablet        Contact dermatitis appear rash. Will use oral steroids as topical has not improved symptoms. Will also give allergy eye drops as she reports eye itching and tearing as well.     Patient Instructions     Steroids as prescribed   Topical anti-itch creams or sprays to affected area as needed   Claritin or Zyrtec daily while rash persists  Avoid touching affected areas    Follow up with PCP in 3-5 days.  Proceed to  ER if symptoms worsen.    If tests are performed, our office will contact you with results only if changes need to made to the care plan discussed with you at the visit. You can review your full results on [facility].    Chief Complaint     Chief Complaint   Patient presents with    Rash     Rash to back x 3-4 days. Tried xyzal, cortisone cream, and technu. Mild itchy. Patient states that her eyes started tearing a lot this morning and eyes were itchy. States she has new chickens and just finished doxycyline.          History of Present Illness       Recently got chickens and finished a course of doxycycline.     Rash  This is a new problem. The current episode started in the past 7 days. The problem is unchanged. The affected locations include the back. The problem is moderate. The rash is characterized by redness and itchiness. Pertinent negatives include no cough, fever, shortness of breath, sore throat or vomiting.       Review of Systems   Review of Systems   Constitutional:  Negative for chills and fever.   HENT:  Negative for ear pain and sore throat.    Eyes:  Positive for itching. Negative for pain and visual disturbance.        Watery eyes   Respiratory:  Negative for cough and shortness of breath.    Cardiovascular:   Negative for chest pain and palpitations.   Gastrointestinal:  Negative for abdominal pain and vomiting.   Genitourinary:  Negative for dysuria and hematuria.   Musculoskeletal:  Negative for arthralgias and back pain.   Skin:  Positive for rash. Negative for color change.   Neurological:  Negative for seizures and syncope.   All other systems reviewed and are negative.        Current Medications       Current Outpatient Medications:     albuterol (2.5 mg/3 mL) 0.083 % nebulizer solution, inhale contents of 1 vial ( 3 MILLILITERS ) in nebulizer by mouth and INTO THE LUNGS every 6 hours if needed for wheezing or shortness of breath, Disp: 75 mL, Rfl: 1    albuterol (PROVENTIL HFA,VENTOLIN HFA) 90 mcg/act inhaler, inhale 2 puffs by mouth and INTO THE LUNGS every 6 hours if needed for wheezing or shortness of breath, Disp: 8.5 g, Rfl: 0    Alyacen 1/35 1-35 MG-MCG per tablet, Take 1 tablet by mouth in the morning, Disp: , Rfl:     Azelastine-Fluticasone 137-50 MCG/ACT SUSP, 1 spray by Each Nare route 2 (two) times a day, Disp: 23 g, Rfl: 3    levocetirizine (XYZAL) 5 MG tablet, Take 1 tablet by mouth every evening, Disp: , Rfl:     olopatadine (PATANOL) 0.1 % ophthalmic solution, Administer 1 drop to both eyes 2 (two) times a day, Disp: 5 mL, Rfl: 1    predniSONE 10 mg tablet, Take 4 tablets (40 mg total) by mouth daily for 3 days, THEN 3 tablets (30 mg total) daily for 3 days, THEN 2 tablets (20 mg total) daily for 3 days, THEN 1 tablet (10 mg total) daily for 3 days., Disp: 30 tablet, Rfl: 0    Sodium Fluoride 5000 PPM 1.1 % PSTE, BRUSH 1 TIME A DAY AS INSTRUCTED AND EXPECTORATE, Disp: , Rfl:     benzonatate (TESSALON PERLES) 100 mg capsule, Take 1 capsule (100 mg total) by mouth 3 (three) times a day as needed for cough (Patient not taking: Reported on 3/22/2025), Disp: 20 capsule, Rfl: 0    methylPREDNISolone 4 MG tablet therapy pack, Use as directed on package (Patient not taking: Reported on 3/22/2025), Disp: 21  each, Rfl: 0    pantoprazole (PROTONIX) 20 mg tablet, take 1 tablet by mouth once daily before breakfast (Patient not taking: Reported on 3/22/2025), Disp: 30 tablet, Rfl: 5    Current Allergies     Allergies as of 2025 - Reviewed 2025   Allergen Reaction Noted    Penicillins  2017    Zithromax [azithromycin] GI Intolerance 2017    Clindamycin Rash 2017            The following portions of the patient's history were reviewed and updated as appropriate: allergies, current medications, past family history, past medical history, past social history, past surgical history and problem list.     Past Medical History:   Diagnosis Date    Elevated cholesterol     Gestational diabetes mellitus, antepartum 2015    Varicella without complication 2021    Formatting of this note might be different from the original. Formatting of this note might be different from the original. Age 9       Past Surgical History:   Procedure Laterality Date     SECTION      DILATION AND CURETTAGE OF UTERUS         Family History   Problem Relation Age of Onset    Diabetes Mother          Medications have been verified.        Objective   /58   Pulse 90   Temp (!) 96.5 °F (35.8 °C)   Resp 18   SpO2 99%        Physical Exam     Physical Exam  Constitutional:       General: She is not in acute distress.  HENT:      Head: Normocephalic.      Nose: Nose normal.   Eyes:      General:         Right eye: No discharge.         Left eye: No discharge.      Extraocular Movements: Extraocular movements intact.      Conjunctiva/sclera: Conjunctivae normal.      Pupils: Pupils are equal, round, and reactive to light.   Cardiovascular:      Rate and Rhythm: Normal rate and regular rhythm.      Pulses: Normal pulses.      Heart sounds: Normal heart sounds.   Pulmonary:      Effort: Pulmonary effort is normal.      Breath sounds: Normal breath sounds.   Abdominal:      General: Abdomen is flat.    Musculoskeletal:         General: Normal range of motion.   Skin:     General: Skin is warm and dry.      Capillary Refill: Capillary refill takes less than 2 seconds.      Findings: Rash (erythematous rash with papules on back of neck) present.   Neurological:      Mental Status: She is alert and oriented to person, place, and time.

## 2025-03-22 NOTE — PATIENT INSTRUCTIONS
Steroids as prescribed   Topical anti-itch creams or sprays to affected area as needed   Claritin or Zyrtec daily while rash persists  Avoid touching affected areas    Follow up with PCP in 3-5 days.  Proceed to  ER if symptoms worsen.

## 2025-05-29 ENCOUNTER — OFFICE VISIT (OUTPATIENT)
Dept: URGENT CARE | Facility: CLINIC | Age: 40
End: 2025-05-29
Payer: COMMERCIAL

## 2025-05-29 VITALS
TEMPERATURE: 97 F | DIASTOLIC BLOOD PRESSURE: 82 MMHG | SYSTOLIC BLOOD PRESSURE: 131 MMHG | OXYGEN SATURATION: 100 % | RESPIRATION RATE: 18 BRPM | HEART RATE: 109 BPM

## 2025-05-29 DIAGNOSIS — J01.00 ACUTE NON-RECURRENT MAXILLARY SINUSITIS: ICD-10-CM

## 2025-05-29 PROCEDURE — S9088 SERVICES PROVIDED IN URGENT: HCPCS | Performed by: FAMILY MEDICINE

## 2025-05-29 PROCEDURE — 99213 OFFICE O/P EST LOW 20 MIN: CPT | Performed by: FAMILY MEDICINE

## 2025-05-29 RX ORDER — DOXYCYCLINE 100 MG/1
100 TABLET ORAL 2 TIMES DAILY
Qty: 14 TABLET | Refills: 0 | Status: SHIPPED | OUTPATIENT
Start: 2025-05-29 | End: 2025-06-05

## 2025-05-29 RX ORDER — METHYLPREDNISOLONE 4 MG/1
TABLET ORAL
Qty: 21 EACH | Refills: 0 | Status: SHIPPED | OUTPATIENT
Start: 2025-05-29

## 2025-05-29 NOTE — PROGRESS NOTES
[facility]  Name: Harriet Morales      : 1985      MRN: [id number]  Encounter Provider: Marilee Fernandes DO  Encounter Date: 2025   Encounter department: [facility] [place]  :  Assessment & Plan  Acute non-recurrent maxillary sinusitis    Orders:  •  methylPREDNISolone 4 MG tablet therapy pack; Use as directed on package  •  doxycycline (ADOXA) 100 MG tablet; Take 1 tablet (100 mg total) by mouth 2 (two) times a day for 7 days  treated as above given worsening symptoms after >1 week and multiple exposures  If symptoms persist follow up with PCP. Discussed Return/ED parameters with patient.         Patient Instructions  Follow up with PCP in 3-5 days.  Proceed to  ER if symptoms worsen.    If tests are performed, our office will contact you with results only if changes need to made to the care plan discussed with you at the visit. [facility]    Chief Complaint:   Chief Complaint   Patient presents with   • Cough     Sore throat, congestion, cough, ear discomfort x1 week. No fever. Has had sick contacts at home.     History of Present Illness   Sinusitis  This is a new problem. The current episode started 1 to 4 weeks ago. The problem has been gradually worsening since onset. There has been no fever. The pain is moderate. Associated symptoms include congestion, coughing, ear pain, headaches, sinus pressure, a sore throat and swollen glands. Pertinent negatives include no chills, diaphoresis, hoarse voice, neck pain, shortness of breath or sneezing. Past treatments include nasal decongestants and oral decongestants. The treatment provided no relief.         Review of Systems   Constitutional:  Negative for chills and diaphoresis.   HENT:  Positive for congestion, ear pain, sinus pressure and sore throat. Negative for hoarse voice and sneezing.    Respiratory:  Positive for cough. Negative for shortness of breath.     Musculoskeletal:  Negative for neck pain.   Neurological:  Positive for headaches.     Past Medical History   Past Medical History[1]  Past Surgical History[2]  Family History[3]  she reports that she has never smoked. She has never been exposed to tobacco smoke. She has never used smokeless tobacco. She reports that she does not drink alcohol and does not use drugs.  Current Outpatient Medications   Medication Instructions   • albuterol (2.5 mg/3 mL) 0.083 % nebulizer solution inhale contents of 1 vial ( 3 MILLILITERS ) in nebulizer by mouth and INTO THE LUNGS every 6 hours if needed for wheezing or shortness of breath   • albuterol (PROVENTIL HFA,VENTOLIN HFA) 90 mcg/act inhaler inhale 2 puffs by mouth and INTO THE LUNGS every 6 hours if needed for wheezing or shortness of breath   • Alyacen 1/35 1-35 MG-MCG per tablet 1 tablet, Daily   • Azelastine-Fluticasone 137-50 MCG/ACT SUSP 1 spray, Each Nare, 2 times daily   • doxycycline (ADOXA) 100 mg, Oral, 2 times daily   • levocetirizine (XYZAL) 5 MG tablet 1 tablet, Every evening   • methylPREDNISolone 4 MG tablet therapy pack Use as directed on package   • Sodium Fluoride 5000 PPM 1.1 % PSTE    Allergies[4]     Objective   /82   Pulse (!) 109   Temp (!) 97 °F (36.1 °C)   Resp 18   SpO2 100%      Physical Exam  Vitals and nursing note reviewed.   Constitutional:       General: She is not in acute distress.     Appearance: Normal appearance. She is not ill-appearing or toxic-appearing.   HENT:      Head: Normocephalic and atraumatic.      Right Ear: A middle ear effusion is present.      Left Ear: A middle ear effusion is present.      Nose: Congestion and rhinorrhea present.      Mouth/Throat:      Pharynx: Posterior oropharyngeal erythema present. No oropharyngeal exudate.     Eyes:      Pupils: Pupils are equal, round, and reactive to light.       Cardiovascular:      Rate and Rhythm: Normal rate and regular rhythm.      Heart sounds: No murmur  "heard.  Pulmonary:      Effort: Pulmonary effort is normal. No respiratory distress.      Breath sounds: Normal breath sounds.   Abdominal:      Palpations: Abdomen is soft.      Tenderness: There is no abdominal tenderness.     Skin:     General: Skin is warm and dry.      Capillary Refill: Capillary refill takes less than 2 seconds.     Neurological:      General: No focal deficit present.      Mental Status: She is alert and oriented to person, place, and time.     Psychiatric:         Mood and Affect: Mood normal.         Behavior: Behavior normal.         Portions of the record may have been created with voice recognition software.  Occasional wrong word or \"sound a like\" substitutions may have occurred due to the inherent limitations of voice recognition software.  Read the chart carefully and recognize, using context, where substitutions have occurred.         [1]  Past Medical History:  Diagnosis Date   • Elevated cholesterol    • Gestational diabetes mellitus, antepartum 2015   • Varicella without complication 2021    Formatting of this note might be different from the original. Formatting of this note might be different from the original. Age 9   [2]  Past Surgical History:  Procedure Laterality Date   •  SECTION     • DILATION AND CURETTAGE OF UTERUS     [3]  Family History  Problem Relation Name Age of Onset   • Diabetes Mother     [4]  Allergies  Allergen Reactions   • Penicillins      Family hx of severe reaction - never taken   • Zithromax [Azithromycin] GI Intolerance   • Clindamycin Rash   "

## 2025-06-07 DIAGNOSIS — R06.2 WHEEZING: ICD-10-CM

## 2025-06-08 RX ORDER — ALBUTEROL SULFATE 0.83 MG/ML
SOLUTION RESPIRATORY (INHALATION)
Qty: 75 ML | Refills: 1 | Status: SHIPPED | OUTPATIENT
Start: 2025-06-08

## 2025-06-12 ENCOUNTER — OFFICE VISIT (OUTPATIENT)
Dept: FAMILY MEDICINE CLINIC | Facility: CLINIC | Age: 40
End: 2025-06-12
Payer: COMMERCIAL

## 2025-06-12 VITALS
TEMPERATURE: 97.8 F | HEART RATE: 94 BPM | WEIGHT: 232.2 LBS | HEIGHT: 62 IN | BODY MASS INDEX: 42.73 KG/M2 | OXYGEN SATURATION: 99 %

## 2025-06-12 DIAGNOSIS — J01.00 ACUTE NON-RECURRENT MAXILLARY SINUSITIS: ICD-10-CM

## 2025-06-12 DIAGNOSIS — L24.7 IRRITANT CONTACT DERMATITIS DUE TO PLANTS, EXCEPT FOOD: ICD-10-CM

## 2025-06-12 DIAGNOSIS — B37.89 CANDIDIASIS OF BREAST: ICD-10-CM

## 2025-06-12 DIAGNOSIS — H10.13 ALLERGIC CONJUNCTIVITIS OF BOTH EYES: Primary | ICD-10-CM

## 2025-06-12 PROCEDURE — 99213 OFFICE O/P EST LOW 20 MIN: CPT | Performed by: NURSE PRACTITIONER

## 2025-06-12 RX ORDER — METHYLPREDNISOLONE 4 MG/1
TABLET ORAL
Qty: 21 EACH | Refills: 0 | Status: SHIPPED | OUTPATIENT
Start: 2025-06-12

## 2025-06-12 RX ORDER — OLOPATADINE HYDROCHLORIDE 1 MG/ML
1 SOLUTION OPHTHALMIC 2 TIMES DAILY
Qty: 5 ML | Refills: 0 | Status: SHIPPED | OUTPATIENT
Start: 2025-06-12 | End: 2025-06-17

## 2025-06-12 RX ORDER — AZITHROMYCIN 250 MG/1
TABLET, FILM COATED ORAL
COMMUNITY
Start: 2025-06-10

## 2025-06-12 RX ORDER — CLOTRIMAZOLE AND BETAMETHASONE DIPROPIONATE 10; .64 MG/G; MG/G
CREAM TOPICAL 2 TIMES DAILY
Qty: 45 G | Refills: 0 | Status: SHIPPED | OUTPATIENT
Start: 2025-06-12 | End: 2025-06-22

## 2025-06-12 NOTE — PROGRESS NOTES
"Name: Harriet Morales      : 1985      MRN: [id number]  Encounter Provider: HERNÁN Cárdenas  Encounter Date: 2025   Encounter department: [facility]  :  Assessment & Plan  Allergic conjunctivitis of both eyes    Orders:    olopatadine (PATANOL) 0.1 % ophthalmic solution; Administer 1 drop to both eyes 2 (two) times a day for 5 days    Irritant contact dermatitis due to plants, except food         Candidiasis of breast    Orders:    clotrimazole-betamethasone (LOTRISONE) 1-0.05 % cream; Apply topically 2 (two) times a day for 10 days  topical provided for yeast   Acute non-recurrent maxillary sinusitis    Orders:    methylPREDNISolone 4 MG tablet therapy pack; Use as directed on package           History of Present Illness   Patient here today and rpeorts that she started with a rash on both arms and positive exposures to poison ivy and sumac and chickens and is allergic to the dander. Patient also has a rash under her left breast and used topicals and sensitive skin soap and was clear and now came back       Review of Systems   Constitutional:  Negative for activity change, appetite change, chills, diaphoresis, fatigue, fever and unexpected weight change.   HENT:  Negative for congestion, ear pain, hearing loss, postnasal drip, sinus pressure, sinus pain, sneezing and sore throat.    Eyes:  Negative for pain, redness and visual disturbance.   Respiratory:  Negative for cough and shortness of breath.    Cardiovascular:  Negative for chest pain and leg swelling.   Gastrointestinal:  Negative for abdominal pain, diarrhea, nausea and vomiting.   Musculoskeletal:  Negative for arthralgias.   Skin:  Positive for rash.   Neurological:  Negative for dizziness and light-headedness.   Psychiatric/Behavioral:  Negative for behavioral problems and dysphoric mood.        Objective   Pulse 94   Temp 97.8 °F (36.6 °C) (Temporal)   Ht 5' 2\" (1.575 m)   Wt 105 kg (232 lb 3.2 oz)   " SpO2 99%   BMI 42.47 kg/m²      Physical Exam  Constitutional:       General: She is not in acute distress.     Appearance: She is well-developed.   HENT:      Head: Normocephalic and atraumatic.     Eyes:      General: Allergic shiner present.      Pupils: Pupils are equal, round, and reactive to light.      Comments: Eyes injected and itchy drainage    Neck:      Thyroid: No thyromegaly.     Cardiovascular:      Rate and Rhythm: Normal rate and regular rhythm.      Heart sounds: Normal heart sounds. No murmur heard.  Pulmonary:      Effort: Pulmonary effort is normal. No respiratory distress.      Breath sounds: Normal breath sounds. No wheezing.   Abdominal:      General: Bowel sounds are normal.      Palpations: Abdomen is soft.     Musculoskeletal:         General: Normal range of motion.      Cervical back: Normal range of motion.     Skin:     General: Skin is warm and dry.          Neurological:      Mental Status: She is alert and oriented to person, place, and time.

## 2025-06-12 NOTE — ASSESSMENT & PLAN NOTE
Orders:    olopatadine (PATANOL) 0.1 % ophthalmic solution; Administer 1 drop to both eyes 2 (two) times a day for 5 days

## 2025-06-17 ENCOUNTER — APPOINTMENT (OUTPATIENT)
Dept: RADIOLOGY | Facility: CLINIC | Age: 40
End: 2025-06-17
Payer: COMMERCIAL

## 2025-06-17 ENCOUNTER — OFFICE VISIT (OUTPATIENT)
Dept: URGENT CARE | Facility: CLINIC | Age: 40
End: 2025-06-17
Payer: COMMERCIAL

## 2025-06-17 VITALS
HEART RATE: 86 BPM | DIASTOLIC BLOOD PRESSURE: 90 MMHG | RESPIRATION RATE: 18 BRPM | OXYGEN SATURATION: 100 % | SYSTOLIC BLOOD PRESSURE: 136 MMHG | TEMPERATURE: 97.4 F
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
## 2025-06-17 DIAGNOSIS — M25.572 ACUTE LEFT ANKLE PAIN: ICD-10-CM

## 2025-06-17 DIAGNOSIS — M25.572 ACUTE LEFT ANKLE PAIN: Primary | ICD-10-CM

## 2025-06-17 PROCEDURE — 73610 X-RAY EXAM OF ANKLE: CPT

## 2025-06-17 PROCEDURE — S9088 SERVICES PROVIDED IN URGENT: HCPCS

## 2025-06-17 PROCEDURE — 73630 X-RAY EXAM OF FOOT: CPT

## 2025-06-17 PROCEDURE — 99214 OFFICE O/P EST MOD 30 MIN: CPT

## 2025-06-17 NOTE — PATIENT INSTRUCTIONS
Tylenol/Ibuprofen for pain  Wear CAM boot for support until final read on XR  If negative, may discontinue and use ACE wrap   Ice 20 minutes 3-4 times per day for 3 days  Insulate the skin from the ice to prevent frostbite  Rest  Elevate    Follow up with orthopedic if symptoms do not improve    Follow up with PCP in 3-5 days.  Proceed to ER if symptoms worsen.    If tests are performed, our office will contact you with results only if changes need to made to the care plan discussed with you at the visit. You can review your full results on [facility].

## 2025-06-17 NOTE — PROGRESS NOTES
[facility]        NAME: Harriet Morales is a 39 y.o. female  : 1985    MRN: [id number]  DATE: 2025  TIME: 10:23 PM    Assessment and Plan   Acute left ankle pain [M25.572]  1. Acute left ankle pain  XR ankle 3+ vw right    XR ankle 3+ vw left    XR foot 3+ vw left      2. Fall, initial encounter  XR ankle 3+ vw right    XR ankle 3+ vw left    XR foot 3+ vw left        XR ankle/foot 3+ vw L: No acute fracture per my read, pending radiology final read. If read is any different from my own read, will contact patient with the results.     CAM boot  Patient Instructions   Tylenol/Ibuprofen for pain  Wear CAM boot for support until final read on XR  If negative, may discontinue and use ACE wrap   Ice 20 minutes 3-4 times per day for 3 days  Insulate the skin from the ice to prevent frostbite  Rest  Elevate    Follow up with orthopedic if symptoms do not improve    Follow up with PCP in 3-5 days.  Proceed to ER if symptoms worsen.    If tests are performed, our office will contact you with results only if changes need to made to the care plan discussed with you at the visit. You can review your full results on [facility].    Chief Complaint     Chief Complaint   Patient presents with   • Left ankle pain     States she was vacuuming the pool and fell. Twisting her ankle. Occurred today. Iced and tool aleve.        History of Present Illness       Ankle Pain   The incident occurred at the pool (At home, vacuuming her pool, fell on the second step). The injury mechanism was a twisting injury. The pain has been Constant since onset. Pertinent negatives include no loss of motion, loss of sensation, muscle weakness, numbness or tingling. She reports no foreign bodies present. The symptoms are aggravated by movement, palpation and weight bearing. She has tried ice and NSAIDs for the symptoms. The treatment provided no relief.       Review of Systems   Review of Systems   Constitutional:   Negative for chills, diaphoresis and fever.   Respiratory: Negative.  Negative for cough, shortness of breath and wheezing.    Cardiovascular: Negative.  Negative for chest pain and palpitations.   Musculoskeletal:  Positive for gait problem (limping).   Skin:  Positive for color change (bruising). Negative for rash and wound.   Neurological:  Negative for tingling and numbness.     Current Medications     Current Medications[1]    Current Allergies     Allergies as of 06/17/2025 - Reviewed 06/17/2025   Allergen Reaction Noted   • Penicillins  12/26/2017   • Zithromax [azithromycin] GI Intolerance 12/26/2017   • Clindamycin Rash 12/26/2017            The following portions of the patient's history were reviewed and updated as appropriate: allergies, current medications, past family history, past medical history, past social history, past surgical history and problem list.     Past Medical History[2]    Past Surgical History[3]    Family History[4]      Medications have been verified.        Objective   /90   Pulse 86   Temp (!) 97.4 °F (36.3 °C)   Resp 18   SpO2 100%        Physical Exam     Physical Exam  Vitals and nursing note reviewed.   Constitutional:       General: She is not in acute distress.     Appearance: Normal appearance. She is not ill-appearing, toxic-appearing or diaphoretic.   HENT:      Head: Normocephalic.     Cardiovascular:      Rate and Rhythm: Normal rate and regular rhythm.      Pulses: Normal pulses.      Heart sounds: Normal heart sounds. No murmur heard.  Pulmonary:      Effort: Pulmonary effort is normal. No respiratory distress.      Breath sounds: Normal breath sounds. No stridor. No wheezing, rhonchi or rales.   Chest:      Chest wall: No tenderness.     Musculoskeletal:      Left ankle: No swelling. Tenderness present over the lateral malleolus and medial malleolus. No base of 5th metatarsal tenderness.      Left Achilles Tendon: Normal.      Left foot: Normal capillary  refill. Swelling and bony tenderness present. Normal pulse.        Legs:         Feet:      Skin:     General: Skin is warm.     Neurological:      Mental Status: She is alert.                          [1]    Current Outpatient Medications:   •  albuterol (2.5 mg/3 mL) 0.083 % nebulizer solution, INHALE CONTENTS OF VIAL ( 3 ML) IN NEBULIZER BY MOUTH AND INTO THE LUNGS EVERY 6 HOURS, Disp: 75 mL, Rfl: 1  •  Alyacen  1-35 MG-MCG per tablet, Take 1 tablet by mouth in the morning, Disp: , Rfl:   •  azithromycin (ZITHROMAX) 250 mg tablet, TAKE 2 TABLETS BY MOUTH TO START THEN TAKE 1 TABLET A DAY FOR 4 DAYS, Disp: , Rfl:   •  clotrimazole-betamethasone (LOTRISONE) 1-0.05 % cream, Apply topically 2 (two) times a day for 10 days, Disp: 45 g, Rfl: 0  •  levocetirizine (XYZAL) 5 MG tablet, Take 1 tablet by mouth every evening, Disp: , Rfl:   •  methylPREDNISolone 4 MG tablet therapy pack, Use as directed on package, Disp: 21 each, Rfl: 0  •  Sodium Fluoride 5000 PPM 1.1 % PSTE, , Disp: , Rfl:   •  albuterol (PROVENTIL HFA,VENTOLIN HFA) 90 mcg/act inhaler, inhale 2 puffs by mouth and INTO THE LUNGS every 6 hours if needed for wheezing or shortness of breath, Disp: 8.5 g, Rfl: 0  •  Azelastine-Fluticasone 137-50 MCG/ACT SUSP, 1 spray by Each Nare route 2 (two) times a day, Disp: 23 g, Rfl: 3  •  olopatadine (PATANOL) 0.1 % ophthalmic solution, Administer 1 drop to both eyes 2 (two) times a day for 5 days, Disp: 5 mL, Rfl: 0[2]  Past Medical History:  Diagnosis Date   • Elevated cholesterol    • Gestational diabetes mellitus, antepartum 2015   • Varicella without complication 2021    Formatting of this note might be different from the original. Formatting of this note might be different from the original. Age 9   [3]  Past Surgical History:  Procedure Laterality Date   •  SECTION     • DILATION AND CURETTAGE OF UTERUS     [4]  Family History  Problem Relation Name Age of Onset   • Diabetes Mother

## 2025-06-23 ENCOUNTER — OFFICE VISIT (OUTPATIENT)
Dept: FAMILY MEDICINE CLINIC | Facility: CLINIC | Age: 40
End: 2025-06-23
Payer: COMMERCIAL

## 2025-06-23 VITALS
BODY MASS INDEX: 43.06 KG/M2 | TEMPERATURE: 96.8 F | OXYGEN SATURATION: 97 % | DIASTOLIC BLOOD PRESSURE: 84 MMHG | HEIGHT: 62 IN | WEIGHT: 234 LBS | HEART RATE: 91 BPM | SYSTOLIC BLOOD PRESSURE: 128 MMHG

## 2025-06-23 DIAGNOSIS — M77.32 HEEL SPUR, LEFT: ICD-10-CM

## 2025-06-23 DIAGNOSIS — S93.402D SPRAIN OF LEFT ANKLE, UNSPECIFIED LIGAMENT, SUBSEQUENT ENCOUNTER: Primary | ICD-10-CM

## 2025-06-23 PROBLEM — S93.402A SPRAIN OF LEFT ANKLE: Status: ACTIVE | Noted: 2025-06-23

## 2025-06-23 PROCEDURE — 99213 OFFICE O/P EST LOW 20 MIN: CPT | Performed by: NURSE PRACTITIONER

## 2025-06-23 NOTE — PROGRESS NOTES
Name: Harriet Morales      : 1985      MRN: [id number]  Encounter Provider: HERNÁN Cárdenas  Encounter Date: 2025   Encounter department: [facility]  :  Assessment & Plan  Sprain of left ankle, unspecified ligament, subsequent encounter    Orders:    Ambulatory Referral to Podiatry; Future  DW patient appearing to have ankle sprain and discussed RICE and elevation and ice and if not improving will send to podiatry   Heel spur, left  Will refer to podiatry   Orders:    Ambulatory Referral to Podiatry; Future           History of Present Illness   Ankle pain 2025  The incident occurred at the pool (At home, vacuuming her pool, fell on the second step). The injury mechanism was a twisting injury. The pain has been Constant since onset. Pertinent negatives include no loss of motion, loss of sensation, muscle weakness, numbness or tingling. She reports no foreign bodies present. The symptoms are aggravated by movement, palpation and weight bearing. She has tried ice and NSAIDs for the symptoms. The treatment provided no relief.      [facility] visit c/o left foot and ankle pain and x-rays were negative     Patient here today for follow up and had x-rays and they had cleared for fracture and has been using ACE wraps and taking aleve and having bruising and swelling       Review of Systems   Constitutional:  Negative for activity change, appetite change, chills, diaphoresis, fatigue, fever and unexpected weight change.   HENT:  Negative for congestion, ear pain, hearing loss, postnasal drip, sinus pressure, sinus pain, sneezing and sore throat.    Eyes:  Negative for pain, redness and visual disturbance.   Respiratory:  Negative for cough and shortness of breath.    Cardiovascular:  Negative for chest pain and leg swelling.   Gastrointestinal:  Negative for abdominal pain, diarrhea, nausea and vomiting.   Endocrine: Negative.    Genitourinary: Negative.    Musculoskeletal:   "Positive for arthralgias and gait problem.   Skin: Negative.    Allergic/Immunologic: Negative.    Neurological:  Negative for dizziness and light-headedness.   Hematological: Negative.    Psychiatric/Behavioral:  Negative for behavioral problems and dysphoric mood.        Objective   /84 (BP Location: Left arm, Patient Position: Sitting, Cuff Size: Large)   Pulse 91   Temp (!) 96.8 °F (36 °C)   Ht 5' 2\" (1.575 m)   Wt 106 kg (234 lb)   SpO2 97%   BMI 42.80 kg/m²      Physical Exam  Constitutional:       General: She is not in acute distress.     Appearance: She is well-developed.   HENT:      Head: Normocephalic and atraumatic.     Eyes:      Pupils: Pupils are equal, round, and reactive to light.     Neck:      Thyroid: No thyromegaly.     Cardiovascular:      Rate and Rhythm: Normal rate and regular rhythm.      Heart sounds: Normal heart sounds. No murmur heard.  Pulmonary:      Effort: Pulmonary effort is normal. No respiratory distress.      Breath sounds: Normal breath sounds. No wheezing.   Abdominal:      General: Bowel sounds are normal.      Palpations: Abdomen is soft.     Musculoskeletal:         General: Normal range of motion.      Cervical back: Normal range of motion.        Feet:      Skin:     General: Skin is warm and dry.     Neurological:      Mental Status: She is alert and oriented to person, place, and time.         "

## 2025-06-23 NOTE — ASSESSMENT & PLAN NOTE
Orders:    Ambulatory Referral to Podiatry; Future  DW patient appearing to have ankle sprain and discussed RICE and elevation and ice and if not improving will send to podiatry

## 2025-07-12 PROBLEM — H10.13 ALLERGIC CONJUNCTIVITIS OF BOTH EYES: Status: RESOLVED | Noted: 2025-06-12 | Resolved: 2025-07-12

## 2025-07-22 DIAGNOSIS — J01.00 SUBACUTE MAXILLARY SINUSITIS: ICD-10-CM

## 2025-07-22 RX ORDER — AZELASTINE HYDROCHLORIDE, FLUTICASONE PROPIONATE 137; 50 UG/1; UG/1
1 SPRAY, METERED NASAL 2 TIMES DAILY
Qty: 69 G | Refills: 1 | Status: SHIPPED | OUTPATIENT
Start: 2025-07-22 | End: 2025-10-20

## 2025-07-22 NOTE — TELEPHONE ENCOUNTER
Patient called she is out of the medication and is requesting a refill     Azelastine-Fluticasone 137-50 MCG/ACT SUSP    Pharmacy on file is best, please advise.       Thank you

## 2025-07-29 ENCOUNTER — TELEPHONE (OUTPATIENT)
Age: 40
End: 2025-07-29

## 2025-08-12 ENCOUNTER — APPOINTMENT (EMERGENCY)
Dept: ULTRASOUND IMAGING | Facility: HOSPITAL | Age: 40
End: 2025-08-12
Payer: COMMERCIAL

## 2025-08-12 ENCOUNTER — HOSPITAL ENCOUNTER (EMERGENCY)
Facility: HOSPITAL | Age: 40
Discharge: HOME | End: 2025-08-13
Attending: EMERGENCY MEDICINE | Admitting: EMERGENCY MEDICINE
Payer: COMMERCIAL

## 2025-08-12 ENCOUNTER — APPOINTMENT (EMERGENCY)
Dept: CT IMAGING | Facility: HOSPITAL | Age: 40
End: 2025-08-12
Payer: COMMERCIAL

## 2025-08-13 ENCOUNTER — APPOINTMENT (OUTPATIENT)
Dept: LAB | Facility: CLINIC | Age: 40
End: 2025-08-13
Payer: COMMERCIAL

## 2025-08-13 ENCOUNTER — TELEPHONE (OUTPATIENT)
Age: 40
End: 2025-08-13

## 2025-08-13 DIAGNOSIS — R39.9 UTI SYMPTOMS: ICD-10-CM

## 2025-08-13 PROCEDURE — 87086 URINE CULTURE/COLONY COUNT: CPT

## 2025-08-15 LAB
BACTERIA UR CULT: ABNORMAL
BACTERIA UR CULT: ABNORMAL